# Patient Record
Sex: MALE | Race: WHITE | NOT HISPANIC OR LATINO | Employment: FULL TIME | URBAN - METROPOLITAN AREA
[De-identification: names, ages, dates, MRNs, and addresses within clinical notes are randomized per-mention and may not be internally consistent; named-entity substitution may affect disease eponyms.]

---

## 2019-07-03 ENCOUNTER — ANESTHESIA (EMERGENCY)
Dept: PERIOP | Facility: HOSPITAL | Age: 59
End: 2019-07-03
Payer: COMMERCIAL

## 2019-07-03 ENCOUNTER — HOSPITAL ENCOUNTER (EMERGENCY)
Facility: HOSPITAL | Age: 59
Discharge: HOME | End: 2019-07-03
Attending: EMERGENCY MEDICINE | Admitting: EMERGENCY MEDICINE
Payer: COMMERCIAL

## 2019-07-03 ENCOUNTER — APPOINTMENT (EMERGENCY)
Dept: RADIOLOGY | Facility: HOSPITAL | Age: 59
End: 2019-07-03
Payer: COMMERCIAL

## 2019-07-03 ENCOUNTER — ANESTHESIA EVENT (EMERGENCY)
Dept: PERIOP | Facility: HOSPITAL | Age: 59
End: 2019-07-03
Payer: COMMERCIAL

## 2019-07-03 ENCOUNTER — HOSPITAL ENCOUNTER (EMERGENCY)
Dept: PERIOP | Facility: HOSPITAL | Age: 59
Discharge: HOME | End: 2019-07-03
Attending: INTERNAL MEDICINE
Payer: COMMERCIAL

## 2019-07-03 VITALS
RESPIRATION RATE: 12 BRPM | SYSTOLIC BLOOD PRESSURE: 132 MMHG | HEART RATE: 95 BPM | TEMPERATURE: 97.5 F | OXYGEN SATURATION: 100 % | DIASTOLIC BLOOD PRESSURE: 89 MMHG

## 2019-07-03 VITALS
BODY MASS INDEX: 31.39 KG/M2 | DIASTOLIC BLOOD PRESSURE: 88 MMHG | HEART RATE: 88 BPM | SYSTOLIC BLOOD PRESSURE: 120 MMHG | WEIGHT: 200 LBS | RESPIRATION RATE: 20 BRPM | HEIGHT: 67 IN | OXYGEN SATURATION: 95 % | TEMPERATURE: 98 F

## 2019-07-03 DIAGNOSIS — K22.2 ESOPHAGEAL OBSTRUCTION DUE TO FOOD IMPACTION: Primary | ICD-10-CM

## 2019-07-03 DIAGNOSIS — T18.128A ESOPHAGEAL OBSTRUCTION DUE TO FOOD IMPACTION: Primary | ICD-10-CM

## 2019-07-03 DIAGNOSIS — T18.108A FOREIGN BODY IN ESOPHAGUS, INITIAL ENCOUNTER: ICD-10-CM

## 2019-07-03 DIAGNOSIS — K21.00 GASTROESOPHAGEAL REFLUX DISEASE WITH ESOPHAGITIS: Primary | ICD-10-CM

## 2019-07-03 LAB
ANION GAP SERPL CALCULATED.3IONS-SCNC: 11 MMOL/L (ref 4–13)
BASOPHILS # BLD AUTO: 0.04 THOUSANDS/ΜL (ref 0–0.1)
BASOPHILS NFR BLD AUTO: 1 % (ref 0–1)
BUN SERPL-MCNC: 20 MG/DL (ref 5–25)
CALCIUM SERPL-MCNC: 9 MG/DL (ref 8.3–10.1)
CHLORIDE SERPL-SCNC: 103 MMOL/L (ref 100–108)
CO2 SERPL-SCNC: 26 MMOL/L (ref 21–32)
CREAT SERPL-MCNC: 1.14 MG/DL (ref 0.6–1.3)
EOSINOPHIL # BLD AUTO: 0.26 THOUSAND/ΜL (ref 0–0.61)
EOSINOPHIL NFR BLD AUTO: 3 % (ref 0–6)
ERYTHROCYTE [DISTWIDTH] IN BLOOD BY AUTOMATED COUNT: 20.3 % (ref 11.6–15.1)
GFR SERPL CREATININE-BSD FRML MDRD: 70 ML/MIN/1.73SQ M
GLUCOSE SERPL-MCNC: 110 MG/DL (ref 65–140)
HCT VFR BLD AUTO: 38.7 % (ref 36.5–49.3)
HGB BLD-MCNC: 12 G/DL (ref 12–17)
IMM GRANULOCYTES # BLD AUTO: 0.05 THOUSAND/UL (ref 0–0.2)
IMM GRANULOCYTES NFR BLD AUTO: 1 % (ref 0–2)
INR PPP: 1.05 (ref 0.84–1.19)
LYMPHOCYTES # BLD AUTO: 1.74 THOUSANDS/ΜL (ref 0.6–4.47)
LYMPHOCYTES NFR BLD AUTO: 22 % (ref 14–44)
MCH RBC QN AUTO: 18.6 PG (ref 26.8–34.3)
MCHC RBC AUTO-ENTMCNC: 31 G/DL (ref 31.4–37.4)
MCV RBC AUTO: 60 FL (ref 82–98)
MONOCYTES # BLD AUTO: 0.62 THOUSAND/ΜL (ref 0.17–1.22)
MONOCYTES NFR BLD AUTO: 8 % (ref 4–12)
NEUTROPHILS # BLD AUTO: 5.21 THOUSANDS/ΜL (ref 1.85–7.62)
NEUTS SEG NFR BLD AUTO: 65 % (ref 43–75)
NRBC BLD AUTO-RTO: 1 /100 WBCS
PLATELET # BLD AUTO: 275 THOUSANDS/UL (ref 149–390)
PMV BLD AUTO: 9.3 FL (ref 8.9–12.7)
POTASSIUM SERPL-SCNC: 4.1 MMOL/L (ref 3.5–5.3)
PROTHROMBIN TIME: 13.7 SECONDS (ref 11.6–14.5)
RBC # BLD AUTO: 6.46 MILLION/UL (ref 3.88–5.62)
SODIUM SERPL-SCNC: 140 MMOL/L (ref 136–145)
TROPONIN I SERPL-MCNC: <0.02 NG/ML
WBC # BLD AUTO: 7.92 THOUSAND/UL (ref 4.31–10.16)

## 2019-07-03 PROCEDURE — 84484 ASSAY OF TROPONIN QUANT: CPT | Performed by: EMERGENCY MEDICINE

## 2019-07-03 PROCEDURE — 99285 EMERGENCY DEPT VISIT HI MDM: CPT

## 2019-07-03 PROCEDURE — 36415 COLL VENOUS BLD VENIPUNCTURE: CPT | Performed by: EMERGENCY MEDICINE

## 2019-07-03 PROCEDURE — 96374 THER/PROPH/DIAG INJ IV PUSH: CPT

## 2019-07-03 PROCEDURE — 85025 COMPLETE CBC W/AUTO DIFF WBC: CPT | Performed by: EMERGENCY MEDICINE

## 2019-07-03 PROCEDURE — 43235 EGD DIAGNOSTIC BRUSH WASH: CPT

## 2019-07-03 PROCEDURE — 93005 ELECTROCARDIOGRAM TRACING: CPT

## 2019-07-03 PROCEDURE — 85610 PROTHROMBIN TIME: CPT | Performed by: EMERGENCY MEDICINE

## 2019-07-03 PROCEDURE — 99283 EMERGENCY DEPT VISIT LOW MDM: CPT | Performed by: EMERGENCY MEDICINE

## 2019-07-03 PROCEDURE — 71045 X-RAY EXAM CHEST 1 VIEW: CPT

## 2019-07-03 PROCEDURE — 96375 TX/PRO/DX INJ NEW DRUG ADDON: CPT

## 2019-07-03 PROCEDURE — 80048 BASIC METABOLIC PNL TOTAL CA: CPT | Performed by: EMERGENCY MEDICINE

## 2019-07-03 RX ORDER — GLYCOPYRROLATE 0.2 MG/ML
INJECTION INTRAMUSCULAR; INTRAVENOUS AS NEEDED
Status: DISCONTINUED | OUTPATIENT
Start: 2019-07-03 | End: 2019-07-03 | Stop reason: SURG

## 2019-07-03 RX ORDER — NITROGLYCERIN 0.3 MG/1
0.3 TABLET SUBLINGUAL
COMMUNITY

## 2019-07-03 RX ORDER — IBUPROFEN 600 MG/1
TABLET ORAL EVERY 6 HOURS PRN
COMMUNITY
End: 2021-12-18

## 2019-07-03 RX ORDER — HYDROMORPHONE HCL/PF 1 MG/ML
0.4 SYRINGE (ML) INJECTION
Status: CANCELLED | OUTPATIENT
Start: 2019-07-03

## 2019-07-03 RX ORDER — OMEPRAZOLE 20 MG/1
20 CAPSULE, DELAYED RELEASE ORAL 2 TIMES DAILY
Qty: 60 CAPSULE | Refills: 1 | Status: SHIPPED | OUTPATIENT
Start: 2019-07-03 | End: 2019-07-15 | Stop reason: HOSPADM

## 2019-07-03 RX ORDER — FENTANYL CITRATE/PF 50 MCG/ML
25 SYRINGE (ML) INJECTION
Status: CANCELLED | OUTPATIENT
Start: 2019-07-03

## 2019-07-03 RX ORDER — PROPOFOL 10 MG/ML
INJECTION, EMULSION INTRAVENOUS AS NEEDED
Status: DISCONTINUED | OUTPATIENT
Start: 2019-07-03 | End: 2019-07-03 | Stop reason: SURG

## 2019-07-03 RX ORDER — ONDANSETRON 2 MG/ML
4 INJECTION INTRAMUSCULAR; INTRAVENOUS ONCE AS NEEDED
Status: CANCELLED | OUTPATIENT
Start: 2019-07-03

## 2019-07-03 RX ORDER — SIMVASTATIN 20 MG
20 TABLET ORAL
COMMUNITY
End: 2022-05-16

## 2019-07-03 RX ORDER — SUCCINYLCHOLINE/SOD CL,ISO/PF 100 MG/5ML
SYRINGE (ML) INTRAVENOUS AS NEEDED
Status: DISCONTINUED | OUTPATIENT
Start: 2019-07-03 | End: 2019-07-03 | Stop reason: SURG

## 2019-07-03 RX ORDER — ALBUTEROL SULFATE 90 UG/1
AEROSOL, METERED RESPIRATORY (INHALATION) AS NEEDED
Status: DISCONTINUED | OUTPATIENT
Start: 2019-07-03 | End: 2019-07-03 | Stop reason: SURG

## 2019-07-03 RX ORDER — METOCLOPRAMIDE HYDROCHLORIDE 5 MG/ML
10 INJECTION INTRAMUSCULAR; INTRAVENOUS ONCE AS NEEDED
Status: CANCELLED | OUTPATIENT
Start: 2019-07-03

## 2019-07-03 RX ORDER — SODIUM CHLORIDE, SODIUM LACTATE, POTASSIUM CHLORIDE, CALCIUM CHLORIDE 600; 310; 30; 20 MG/100ML; MG/100ML; MG/100ML; MG/100ML
50 INJECTION, SOLUTION INTRAVENOUS CONTINUOUS
Status: CANCELLED | OUTPATIENT
Start: 2019-07-03

## 2019-07-03 RX ORDER — SUCRALFATE 1 G/1
1 TABLET ORAL 4 TIMES DAILY
COMMUNITY
End: 2021-12-19 | Stop reason: HOSPADM

## 2019-07-03 RX ORDER — ALBUTEROL SULFATE 2.5 MG/3ML
2.5 SOLUTION RESPIRATORY (INHALATION) ONCE AS NEEDED
Status: CANCELLED | OUTPATIENT
Start: 2019-07-03

## 2019-07-03 RX ORDER — ONDANSETRON 2 MG/ML
4 INJECTION INTRAMUSCULAR; INTRAVENOUS ONCE
Status: COMPLETED | OUTPATIENT
Start: 2019-07-03 | End: 2019-07-03

## 2019-07-03 RX ORDER — SODIUM CHLORIDE, SODIUM LACTATE, POTASSIUM CHLORIDE, CALCIUM CHLORIDE 600; 310; 30; 20 MG/100ML; MG/100ML; MG/100ML; MG/100ML
INJECTION, SOLUTION INTRAVENOUS CONTINUOUS PRN
Status: DISCONTINUED | OUTPATIENT
Start: 2019-07-03 | End: 2019-07-03 | Stop reason: SURG

## 2019-07-03 RX ORDER — FENTANYL CITRATE 50 UG/ML
50 INJECTION, SOLUTION INTRAMUSCULAR; INTRAVENOUS ONCE
Status: COMPLETED | OUTPATIENT
Start: 2019-07-03 | End: 2019-07-03

## 2019-07-03 RX ORDER — MIDAZOLAM HYDROCHLORIDE 1 MG/ML
INJECTION INTRAMUSCULAR; INTRAVENOUS AS NEEDED
Status: DISCONTINUED | OUTPATIENT
Start: 2019-07-03 | End: 2019-07-03 | Stop reason: SURG

## 2019-07-03 RX ORDER — PROMETHAZINE HYDROCHLORIDE 25 MG/ML
12.5 INJECTION, SOLUTION INTRAMUSCULAR; INTRAVENOUS ONCE AS NEEDED
Status: CANCELLED | OUTPATIENT
Start: 2019-07-03

## 2019-07-03 RX ORDER — ONDANSETRON 2 MG/ML
INJECTION INTRAMUSCULAR; INTRAVENOUS AS NEEDED
Status: DISCONTINUED | OUTPATIENT
Start: 2019-07-03 | End: 2019-07-03 | Stop reason: SURG

## 2019-07-03 RX ORDER — ALBUTEROL SULFATE 2.5 MG/3ML
2.5 SOLUTION RESPIRATORY (INHALATION) EVERY 6 HOURS PRN
COMMUNITY

## 2019-07-03 RX ADMIN — MIDAZOLAM HYDROCHLORIDE 2 MG: 1 INJECTION, SOLUTION INTRAMUSCULAR; INTRAVENOUS at 20:48

## 2019-07-03 RX ADMIN — ONDANSETRON 4 MG: 2 INJECTION INTRAMUSCULAR; INTRAVENOUS at 20:53

## 2019-07-03 RX ADMIN — FENTANYL CITRATE 50 MCG: 50 INJECTION INTRAMUSCULAR; INTRAVENOUS at 19:46

## 2019-07-03 RX ADMIN — PROPOFOL 200 MG: 10 INJECTION, EMULSION INTRAVENOUS at 20:53

## 2019-07-03 RX ADMIN — LIDOCAINE HYDROCHLORIDE 100 MG: 20 INJECTION, SOLUTION INTRAVENOUS at 20:53

## 2019-07-03 RX ADMIN — ONDANSETRON 4 MG: 2 INJECTION INTRAMUSCULAR; INTRAVENOUS at 19:45

## 2019-07-03 RX ADMIN — GLYCOPYRROLATE 0.2 MG: 0.2 INJECTION, SOLUTION INTRAMUSCULAR; INTRAVENOUS at 20:48

## 2019-07-03 RX ADMIN — SODIUM CHLORIDE, SODIUM LACTATE, POTASSIUM CHLORIDE, AND CALCIUM CHLORIDE: .6; .31; .03; .02 INJECTION, SOLUTION INTRAVENOUS at 20:44

## 2019-07-03 RX ADMIN — Medication 160 MG: at 20:53

## 2019-07-03 RX ADMIN — ALBUTEROL SULFATE 3 PUFF: 90 AEROSOL, METERED RESPIRATORY (INHALATION) at 21:09

## 2019-07-03 NOTE — ED PROVIDER NOTES
History  Chief Complaint   Patient presents with    Foreign Body in Throat     pt reports to ed w c/o myrna swallowing  pt reports eating dinner and reports food stuck in throat  HPI patient is a 56-year-old male, reports eating dinner and a piece of pork chop got stuck in his esophagus  Patient reports a similar episode in the past   Patient reports he was able the clear that but required endoscopy with dilatation of his esophagus in the past   Patient reports history of cardiac disease in the past   He reports that this does not feel like his heart disease reports this feels like there is something stuck in his esophagus  Patient reports that he was eating a pork chop and then felt like it was stuck  Patient reports he cannot get any other fluids down  He reports he sips or drinks any small amounts of liquid and it just comes right back up  Past medical history of asthma, hyperlipidemia hypertension cardiac disease  Family history noncontributory  Social history, nonsmoker no history of drug abuse    Prior to Admission Medications   Prescriptions Last Dose Informant Patient Reported? Taking?    albuterol (2 5 mg/3 mL) 0 083 % nebulizer solution   Yes No   Sig: Take 2 5 mg by nebulization every 6 (six) hours as needed for wheezing or shortness of breath   ibuprofen (MOTRIN) 600 mg tablet   Yes No   Sig: Take by mouth every 6 (six) hours as needed for mild pain   metoprolol tartrate (LOPRESSOR) 25 mg tablet   Yes No   Sig: Take 25 mg by mouth every 12 (twelve) hours   nitroglycerin (NITROSTAT) 0 3 mg SL tablet   Yes No   Sig: Place 0 3 mg under the tongue every 5 (five) minutes as needed for chest pain   omeprazole (PriLOSEC) 20 mg delayed release capsule   No No   Sig: Take 1 capsule (20 mg total) by mouth 2 (two) times a day   raNITIdine HCl (ZANTAC 75 PO)   Yes No   Sig: Take by mouth   simvastatin (ZOCOR) 20 mg tablet   Yes No   Sig: Take 20 mg by mouth daily at bedtime   sucralfate (CARAFATE) 1 g tablet   Yes No   Sig: Take 1 g by mouth 4 (four) times a day      Facility-Administered Medications: None       Past Medical History:   Diagnosis Date    Asthma     [facility] )     liver    [facility] )     Hyperlipidemia     Hypertension        Past Surgical History:   Procedure Laterality Date    BACK SURGERY      CHOLECYSTECTOMY      CORONARY ANGIOPLASTY WITH STENT PLACEMENT      HERNIA REPAIR         History reviewed  No pertinent family history  I have reviewed and agree with the history as documented  Social History     Tobacco Use    Smoking status: Never Smoker    Smokeless tobacco: Never Used   Substance Use Topics    Alcohol use: Never     Frequency: Never    Drug use: Never        Review of Systems   Constitutional: Negative for diaphoresis, fatigue and fever  HENT: Positive for trouble swallowing  Negative for congestion, ear pain, nosebleeds and sore throat  Eyes: Negative for photophobia, pain, discharge and visual disturbance  Respiratory: Negative for cough, choking, chest tightness, shortness of breath and wheezing  Cardiovascular: Positive for chest pain  Negative for palpitations  Gastrointestinal: Negative for abdominal distention, abdominal pain, diarrhea and vomiting  Genitourinary: Negative for dysuria, flank pain and frequency  Musculoskeletal: Negative for back pain, gait problem and joint swelling  Skin: Negative for color change and rash  Neurological: Negative for dizziness, syncope and headaches  Psychiatric/Behavioral: Negative for behavioral problems and confusion  The patient is not nervous/anxious  All other systems reviewed and are negative  Physical Exam  Physical Exam   Constitutional: He is oriented to person, place, and time  He appears well-developed and well-nourished  HENT:   Head: Normocephalic     Right Ear: External ear normal    Left Ear: External ear normal    Nose: Nose normal    Mouth/Throat: Oropharynx is clear and moist    Eyes: Pupils are equal, round, and reactive to light  EOM and lids are normal    Neck: Normal range of motion  Neck supple  Cardiovascular: Normal rate, regular rhythm and normal heart sounds  Pulmonary/Chest: Effort normal and breath sounds normal  No respiratory distress  Abdominal: Soft  Bowel sounds are normal  There is no tenderness  Musculoskeletal: Normal range of motion  He exhibits no deformity  Neurological: He is alert and oriented to person, place, and time  Skin: Skin is warm and dry  Psychiatric: He has a normal mood and affect  Nursing note and vitals reviewed    Pulse oximetry 100% on room air adequate oxygenation there is no hypoxia    Vital Signs  ED Triage Vitals [07/03/19 1910]   Temperature Pulse Respirations Blood Pressure SpO2   98 °F (36 7 °C) 88 20 120/88 95 %      Temp Source Heart Rate Source Patient Position - Orthostatic VS BP Location FiO2 (%)   Oral Monitor Lying Right arm --      Pain Score       Worst Possible Pain           Vitals:    07/03/19 1910   BP: 120/88   Pulse: 88   Patient Position - Orthostatic VS: Lying         Visual Acuity      ED Medications  Medications   fentanyl citrate (PF) 100 MCG/2ML 50 mcg (50 mcg Intravenous Given 7/3/19 1946)   ondansetron (ZOFRAN) injection 4 mg (4 mg Intravenous Given 7/3/19 1945)       Diagnostic Studies  Results Reviewed     Procedure Component Value Units Date/Time    Basic metabolic panel [[id number]] Collected:  07/03/19 1928    Lab Status:  Final result Specimen:  Blood from Arm, Left Updated:  07/03/19 2008     Sodium 140 mmol/L      Potassium 4 1 mmol/L      Chloride 103 mmol/L      CO2 26 mmol/L      ANION GAP 11 mmol/L      BUN 20 mg/dL      Creatinine 1 14 mg/dL      Glucose 110 mg/dL      Calcium 9 0 mg/dL      eGFR 70 ml/min/1 73sq m     Narrative:       [place] guidelines for Chronic Kidney Disease (CKD):     Stage 1 with normal or high GFR (GFR > 90 mL/min/1 73 square meters)    Stage 2 Mild CKD (GFR = 60-89 mL/min/1 73 square meters)    Stage 3A Moderate CKD (GFR = 45-59 mL/min/1 73 square meters)    Stage 3B Moderate CKD (GFR = 30-44 mL/min/1 73 square meters)    Stage 4 Severe CKD (GFR = 15-29 mL/min/1 73 square meters)    Stage 5 End Stage CKD (GFR <15 mL/min/1 73 square meters)  Note: GFR calculation is accurate only with a steady state creatinine    Troponin I [[id number]]  (Normal) Collected:  07/03/19 1928    Lab Status:  Final result Specimen:  Blood from Arm, Left Updated:  07/03/19 2000     Troponin I <0 02 ng/mL     Protime-INR [[id number]]  (Normal) Collected:  07/03/19 1928    Lab Status:  Final result Specimen:  Blood from Arm, Left Updated:  07/03/19 1949     Protime 13 7 seconds      INR 1 05    CBC and differential [[id number]]  (Abnormal) Collected:  07/03/19 1928    Lab Status:  Final result Specimen:  Blood from Arm, Left Updated:  07/03/19 1936     WBC 7 92 Thousand/uL      RBC 6 46 Million/uL      Hemoglobin 12 0 g/dL      Hematocrit 38 7 %      MCV 60 fL      MCH 18 6 pg      MCHC 31 0 g/dL      RDW 20 3 %      MPV 9 3 fL      Platelets 904 Thousands/uL      nRBC 1 /100 WBCs      Neutrophils Relative 65 %      Immat GRANS % 1 %      Lymphocytes Relative 22 %      Monocytes Relative 8 %      Eosinophils Relative 3 %      Basophils Relative 1 %      Neutrophils Absolute 5 21 Thousands/µL      Immature Grans Absolute 0 05 Thousand/uL      Lymphocytes Absolute 1 74 Thousands/µL      Monocytes Absolute 0 62 Thousand/µL      Eosinophils Absolute 0 26 Thousand/µL      Basophils Absolute 0 04 Thousands/µL                  XR chest 1 view portable    (Results Pending)              Procedures  ECG 12 Lead Documentation Only  Date/Time: 7/3/2019 7:40 PM  Performed by: Fermin Mata MD  Authorized by: Fermin Mata MD     Indications / Diagnosis:  Esophageal foreign body, pork chop  ECG reviewed by me, the ED Provider: yes    Patient location:  ED  Previous ECG: Previous ECG:  Unavailable  Interpretation:     Interpretation: non-specific    Rate:     ECG rate:  90    ECG rate assessment: normal    Rhythm:     Rhythm: sinus rhythm    Ectopy:     Ectopy: none    QRS:     QRS axis:  Normal  ST segments:     ST segments:  Non-specific           ED Course          spoke with Dr Annika Lam, will do endoscopy     other diagnostic testing showed normal electrolytes  Cardiac troponin was negative no sign of cardiac ischemia, patient's white count normal 7 9 no sign of inflammation, hemoglobin normal at 12 no sign of anemia  MDM medical decision making 42-year-old male presents emergency department with a pork chop stuck in his esophagus  Patient is not able to drink even small amounts without bringing it back up  We tried a carbonated beverage if the patient was unable to tolerated  There was no relief of the patient's symptoms  I spoke with Gastroenterology patient will go to the OR for endoscopy  Disposition  Final diagnoses:   Esophageal obstruction due to food impaction     Time reflects when diagnosis was documented in both MDM as applicable and the Disposition within this note     Time User Action Codes Description Comment    7/3/2019  7:39 PM Adelina Martell [K22 2,  L08 [id number]] Esophageal obstruction due to food impaction     7/3/2019  8:15 PM Cecere, [address] [[id number]] Foreign body in esophagus, initial encounter       ED Disposition     ED Disposition Condition Date/Time Comment    Send to OR  Wed Jul 3, 2019  7:43 PM       Follow-up Information    None         Patient's Medications   Discharge Prescriptions    OMEPRAZOLE (PRILOSEC) 20 MG DELAYED RELEASE CAPSULE    Take 1 capsule (20 mg total) by mouth 2 (two) times a day       Start Date: 7/3/2019  End Date: --       Order Dose: 20 mg       Quantity: 60 capsule    Refills: 1     No discharge procedures on file      ED Provider  Electronically Signed by           Laura Granados MD  07/03/19 1710 [facility]

## 2019-07-04 NOTE — ANESTHESIA POSTPROCEDURE EVALUATION
Post-Op Assessment Note    CV Status:  Stable  Pain Score: 0    Pain management: adequate     Mental Status:  Sleepy   Hydration Status:  Stable   PONV Controlled:  None   Airway Patency:  Patent and adequate   Post Op Vitals Reviewed: Yes      Staff: CRNA           /81 (07/03/19 2115)    Temp 97 5 °F (36 4 °C) (07/03/19 2115)    Pulse 105 (07/03/19 2115)   Resp 12 (07/03/19 2115)    SpO2   98%

## 2019-07-04 NOTE — ANESTHESIA PREPROCEDURE EVALUATION
Review of Systems/Medical History  Patient summary reviewed  Chart reviewed  No history of anesthetic complications     Cardiovascular  Hyperlipidemia, Hypertension , Past MI , CAD , CAD status: 2VD, Cardiac stents     Pulmonary  Not a smoker , Asthma ,        GI/Hepatic    GI malignancy (liver cancer),   Comment:  Foreign body in esophagus, initial encounter           Endo/Other     GYN       Hematology   Musculoskeletal       Neurology   Psychology           Physical Exam    Airway    Mallampati score: II  TM Distance: >3 FB  Neck ROM: full     Dental   No notable dental hx     Cardiovascular  Cardiovascular exam normal    Pulmonary  Pulmonary exam normal Breath sounds clear to auscultation,     Other Findings        Anesthesia Plan  ASA Score- 3 Emergent    Anesthesia Type- general with ASA Monitors  Additional Monitors:   Airway Plan: ETT  Plan Factors- Patient instructed to abstain from smoking on day of procedure       Induction- intravenous  Postoperative Plan-   Planned trial extubation    Informed Consent- Anesthetic plan and risks discussed with patient  I personally reviewed this patient with the CRNA  Discussed and agreed on the Anesthesia Plan with the CRNA             Lab Results   Component Value Date    WBC 7 92 07/03/2019    HGB 12 0 07/03/2019    HCT 38 7 07/03/2019    MCV 60 (L) 07/03/2019     07/03/2019     Lab Results   Component Value Date    CALCIUM 9 0 07/03/2019    K 4 1 07/03/2019    CO2 26 07/03/2019     07/03/2019    BUN 20 07/03/2019    CREATININE 1 14 07/03/2019     Lab Results   Component Value Date    INR 1 05 07/03/2019    PROTIME 13 7 07/03/2019     No results found for: PTT  Type and Screen:        Discussed with pt the benefits/alternatives and risks or General Anesthesia including breathing tube remaining in place if not strong enough, PONV, damage to lips and teeth, sore throat, eye injury or blindness    I, Dr Mino Rust, the attending physician, have personally seen and evaluated the patient prior to anesthetic care  I have reviewed the pre-anesthetic record, and other medical records if appropriate to the anesthetic care  If a CRNA is involved in the case, I have reviewed the CRNA assessment, if present, and agree  The patient is in a suitable condition to proceed with my formulated anesthetic plan

## 2019-07-04 NOTE — H&P
History and Physical - [facility]  Yakov Lagunas 63 y o  male MRN: [id number]                  HPI: aYkov Lagunas is a 63y o  year old male who presents for emergency endoscopy  The patient went to a [employer] [facility] and ate a pork dinner today at 5:30 about 3 hours ago  Since that time he has felt intense substernal chest pressure and has been able to swallow  He spits out all of his secretions and liquids when he swallows it  He reports that this happened a year ago and he had a syncopal episode never able to get the food out and then the following day he had endoscopy with the dilation  He does have chronic gastroesophageal reflux disease with a hiatal hernia  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  He reports that his symptoms are partially controlled with ranitidine b i d  And Carafate q i d  He is not on a PPI for some unclear reason  He reports uncontrolled heartburn regurgitation at times  He reports solid food dysphagia at times  He denies liquid food dysphagia denies nausea vomiting melena hematochezia  He is not on any blood thinners  REVIEW OF SYSTEMS: Per the HPI, and otherwise unremarkable  Historical Information   Past Medical History:   Diagnosis Date    Asthma     [facility]     liver    [facility]     Hyperlipidemia     Hypertension      Past Surgical History:   Procedure Laterality Date    BACK SURGERY      CHOLECYSTECTOMY      HERNIA REPAIR       Social History   Social History     Substance and Sexual Activity   Alcohol Use Never    Frequency: Never     Social History     Substance and Sexual Activity   Drug Use Never     Social History     Tobacco Use   Smoking Status Never Smoker   Smokeless Tobacco Never Used     History reviewed  No pertinent family history      Meds/Allergies       (Not in a hospital admission)    Allergies   Allergen Reactions    Iodine Anaphylaxis    Penicillins Anaphylaxis       Objective     There were no vitals taken for this visit  /67   Pulse 86   Temp 98 6 °F (37 °C) (Temporal)   Resp 19   SpO2 95%         PHYSICAL EXAM    There were no vitals taken for this visit  Gen: NAD  CV: RRR  CHEST: Clear  ABD: soft, NT/ND  EXT: no edema      ASSESSMENT/PLAN:  This is a 63y o  year old male here for endoscopy with food impaction removal, and he is stable and optimized for his procedure

## 2019-07-05 LAB
ATRIAL RATE: 90 BPM
P AXIS: 54 DEGREES
PR INTERVAL: 170 MS
QRS AXIS: 71 DEGREES
QRSD INTERVAL: 92 MS
QT INTERVAL: 350 MS
QTC INTERVAL: 428 MS
T WAVE AXIS: 6 DEGREES
VENTRICULAR RATE: 90 BPM

## 2019-07-05 PROCEDURE — 93010 ELECTROCARDIOGRAM REPORT: CPT | Performed by: INTERNAL MEDICINE

## 2019-07-12 ENCOUNTER — TELEPHONE (OUTPATIENT)
Dept: GASTROENTEROLOGY | Facility: CLINIC | Age: 59
End: 2019-07-12

## 2019-07-15 ENCOUNTER — HOSPITAL ENCOUNTER (OUTPATIENT)
Dept: GASTROENTEROLOGY | Facility: HOSPITAL | Age: 59
Setting detail: OUTPATIENT SURGERY
Discharge: HOME | End: 2019-07-15
Attending: INTERNAL MEDICINE
Payer: COMMERCIAL

## 2019-07-15 ENCOUNTER — ANESTHESIA EVENT (OUTPATIENT)
Dept: GASTROENTEROLOGY | Facility: HOSPITAL | Age: 59
End: 2019-07-15

## 2019-07-15 ENCOUNTER — ANESTHESIA (OUTPATIENT)
Dept: GASTROENTEROLOGY | Facility: HOSPITAL | Age: 59
End: 2019-07-15

## 2019-07-15 VITALS
TEMPERATURE: 97.8 F | SYSTOLIC BLOOD PRESSURE: 128 MMHG | RESPIRATION RATE: 16 BRPM | BODY MASS INDEX: 32.48 KG/M2 | HEART RATE: 87 BPM | HEIGHT: 68 IN | DIASTOLIC BLOOD PRESSURE: 78 MMHG | OXYGEN SATURATION: 96 % | WEIGHT: 214.29 LBS

## 2019-07-15 DIAGNOSIS — R09.89 GLOBUS SENSATION: ICD-10-CM

## 2019-07-15 DIAGNOSIS — K21.9 GASTROESOPHAGEAL REFLUX DISEASE WITHOUT ESOPHAGITIS: Primary | ICD-10-CM

## 2019-07-15 PROCEDURE — 88305 TISSUE EXAM BY PATHOLOGIST: CPT | Performed by: PATHOLOGY

## 2019-07-15 PROCEDURE — 43239 EGD BIOPSY SINGLE/MULTIPLE: CPT | Performed by: INTERNAL MEDICINE

## 2019-07-15 RX ORDER — LIDOCAINE HYDROCHLORIDE 10 MG/ML
INJECTION, SOLUTION INFILTRATION; PERINEURAL AS NEEDED
Status: DISCONTINUED | OUTPATIENT
Start: 2019-07-15 | End: 2019-07-15 | Stop reason: SURG

## 2019-07-15 RX ORDER — PROPOFOL 10 MG/ML
INJECTION, EMULSION INTRAVENOUS AS NEEDED
Status: DISCONTINUED | OUTPATIENT
Start: 2019-07-15 | End: 2019-07-15 | Stop reason: SURG

## 2019-07-15 RX ORDER — OMEPRAZOLE 20 MG/1
20 CAPSULE, DELAYED RELEASE ORAL DAILY
Qty: 30 CAPSULE | Refills: 11 | Status: SHIPPED | OUTPATIENT
Start: 2019-07-15 | End: 2021-12-18

## 2019-07-15 RX ORDER — SODIUM CHLORIDE, SODIUM LACTATE, POTASSIUM CHLORIDE, CALCIUM CHLORIDE 600; 310; 30; 20 MG/100ML; MG/100ML; MG/100ML; MG/100ML
125 INJECTION, SOLUTION INTRAVENOUS CONTINUOUS
Status: DISCONTINUED | OUTPATIENT
Start: 2019-07-15 | End: 2019-07-19 | Stop reason: HOSPADM

## 2019-07-15 RX ADMIN — LIDOCAINE HYDROCHLORIDE 100 MG: 10 INJECTION, SOLUTION INFILTRATION; PERINEURAL at 13:51

## 2019-07-15 RX ADMIN — SODIUM CHLORIDE, SODIUM LACTATE, POTASSIUM CHLORIDE, AND CALCIUM CHLORIDE 125 ML/HR: .6; .31; .03; .02 INJECTION, SOLUTION INTRAVENOUS at 13:33

## 2019-07-15 RX ADMIN — PROPOFOL 100 MG: 10 INJECTION, EMULSION INTRAVENOUS at 13:51

## 2019-07-15 NOTE — DISCHARGE INSTRUCTIONS
Upper Endoscopy   WHAT YOU NEED TO KNOW:   An upper endoscopy is also called an upper gastrointestinal (GI) endoscopy, or an esophagogastroduodenoscopy (EGD)  You may feel bloated, gassy, or have some abdominal discomfort after your procedure  Your throat may be sore for 24 to 36 hours  You may burp or pass gas from air that is still inside your body  DISCHARGE INSTRUCTIONS:   Call 911 if:   · You have sudden chest pain or trouble breathing  Seek care immediately if:   · You feel dizzy or faint  · You have trouble swallowing  · You have severe throat pain  · Your bowel movements are very dark or black  · Your abdomen is hard and firm and you have severe pain  · You vomit blood  Contact your healthcare provider if:   · You feel full or bloated and cannot burp or pass gas  · You have not had a bowel movement for 3 days after your procedure  · You have neck pain  · You have a fever or chills  · You have nausea or are vomiting  · You have a rash or hives  · You have questions or concerns about your endoscopy  Relieve a sore throat:  Suck on throat lozenges or crushed ice  Gargle with a small amount of warm salt water  Mix 1 teaspoon of salt and 1 cup of warm water to make salt water  Relieve gas and discomfort from bloating:  Lie on your right side with a heating pad on your abdomen  Take short walks to help pass gas  Eat small meals until bloating is relieved  Rest after your procedure:  Do not drive or make important decisions until the day after your procedure  Return to your normal activity as directed  You can usually return to work the day after your procedure  Follow up with your healthcare provider as directed:  Write down your questions so you remember to ask them during your visits  © 2017 [employer]  Information is for End User's use only and may not be sold, redistributed or otherwise used for commercial purposes   All illustrations and images included in [facility] are the copyrighted property of [employer]  or [employer]  The above information is an  only  It is not intended as medical advice for individual conditions or treatments  Talk to your doctor, nurse or pharmacist before following any medical regimen to see if it is safe and effective for you

## 2019-07-15 NOTE — ANESTHESIA PREPROCEDURE EVALUATION
Review of Systems/Medical History  Patient summary reviewed  Chart reviewed  No history of anesthetic complications     Cardiovascular  EKG reviewed, Exercise tolerance (METS): >4,  Hyperlipidemia, Hypertension controlled, Past MI > 6 months, CAD , History of percutaneous transluminal coronary angioplasty, No angina ,    Pulmonary  Asthma , PRN med  controlled ,        GI/Hepatic            Endo/Other     GYN       Hematology   Musculoskeletal       Neurology   Psychology           Physical Exam    Airway    Mallampati score: III  TM Distance: >3 FB  Neck ROM: full     Dental   No notable dental hx     Cardiovascular      Pulmonary      Other Findings        Anesthesia Plan  ASA Score- 3     Anesthesia Type- IV sedation with anesthesia with ASA Monitors  Additional Monitors:   Airway Plan:         Plan Factors-    Induction- intravenous  Postoperative Plan-     Informed Consent- Anesthetic plan and risks discussed with patient  I personally reviewed this patient with the CRNA  Discussed and agreed on the Anesthesia Plan with the CRNA  Randa Barbosa

## 2019-07-15 NOTE — ANESTHESIA POSTPROCEDURE EVALUATION
Post-Op Assessment Note    CV Status:  Stable    Pain management: adequate     Mental Status:  Sleepy   Hydration Status:  Euvolemic   PONV Controlled:  Controlled   Airway Patency:  Patent   Post Op Vitals Reviewed: Yes      Staff: CRNA           /61 (07/15/19 1402)    Temp 97 8 °F (36 6 °C) (07/15/19 1402)    Pulse 87 (07/15/19 1402)   Resp 18 (07/15/19 1402)    SpO2 95 % (07/15/19 1402)

## 2019-07-15 NOTE — H&P
History and Physical -  [facility]  Gaby Alegria 63 y o  male MRN: [id number]                  HPI: Gaby Alegria is a 63y o  year old male who presents for an EGD after a food impaction a few weeks ago      REVIEW OF SYSTEMS: Per the HPI, and otherwise unremarkable  Historical Information   Past Medical History:   Diagnosis Date    Asthma     [facility] )     liver    [facility] ([facility] )     Hyperlipidemia     Hypertension      Past Surgical History:   Procedure Laterality Date    BACK SURGERY      CHOLECYSTECTOMY      CORONARY ANGIOPLASTY WITH STENT PLACEMENT      HERNIA REPAIR       Social History   Social History     Substance and Sexual Activity   Alcohol Use Never    Frequency: Never     Social History     Substance and Sexual Activity   Drug Use Never     Social History     Tobacco Use   Smoking Status Never Smoker   Smokeless Tobacco Never Used     No family history on file  Meds/Allergies       (Not in a hospital admission)    Allergies   Allergen Reactions    Iodine Anaphylaxis    Penicillins Anaphylaxis       Objective     There were no vitals taken for this visit  PHYSICAL EXAM    There were no vitals taken for this visit  Gen: NAD  CV: RRR  CHEST: Clear  ABD: soft, NT/ND  EXT: no edema      ASSESSMENT/PLAN:  This is a 63y o  year old male here for endoscopy, and he is stable and optimized for his procedure

## 2019-07-18 ENCOUNTER — TELEPHONE (OUTPATIENT)
Dept: GASTROENTEROLOGY | Facility: CLINIC | Age: 59
End: 2019-07-18

## 2019-07-18 NOTE — TELEPHONE ENCOUNTER
----- Message from Cornelio Medina MD sent at 7/18/2019  9:29 AM EDT -----  pls tell him path was normal

## 2019-10-10 ENCOUNTER — HOSPITAL ENCOUNTER (EMERGENCY)
Facility: HOSPITAL | Age: 59
Discharge: HOME | End: 2019-10-11
Attending: EMERGENCY MEDICINE | Admitting: EMERGENCY MEDICINE
Payer: COMMERCIAL

## 2019-10-10 VITALS
RESPIRATION RATE: 18 BRPM | BODY MASS INDEX: 32.58 KG/M2 | DIASTOLIC BLOOD PRESSURE: 69 MMHG | SYSTOLIC BLOOD PRESSURE: 123 MMHG | OXYGEN SATURATION: 94 % | WEIGHT: 207.6 LBS | TEMPERATURE: 97.9 F | HEART RATE: 92 BPM | HEIGHT: 67 IN

## 2019-10-10 DIAGNOSIS — T63.441A ALLERGIC REACTION TO BEE STING: Primary | ICD-10-CM

## 2019-10-10 DIAGNOSIS — T78.2XXA ANAPHYLAXIS, INITIAL ENCOUNTER: ICD-10-CM

## 2019-10-10 PROCEDURE — 96372 THER/PROPH/DIAG INJ SC/IM: CPT

## 2019-10-10 PROCEDURE — 96375 TX/PRO/DX INJ NEW DRUG ADDON: CPT

## 2019-10-10 PROCEDURE — 99283 EMERGENCY DEPT VISIT LOW MDM: CPT

## 2019-10-10 PROCEDURE — 96374 THER/PROPH/DIAG INJ IV PUSH: CPT

## 2019-10-10 PROCEDURE — 99284 EMERGENCY DEPT VISIT MOD MDM: CPT | Performed by: PHYSICIAN ASSISTANT

## 2019-10-10 PROCEDURE — 93005 ELECTROCARDIOGRAM TRACING: CPT

## 2019-10-10 RX ORDER — EPINEPHRINE 1 MG/ML
0.3 INJECTION, SOLUTION, CONCENTRATE INTRAVENOUS ONCE
Status: COMPLETED | OUTPATIENT
Start: 2019-10-10 | End: 2019-10-10

## 2019-10-10 RX ORDER — DIPHENHYDRAMINE HYDROCHLORIDE 50 MG/ML
25 INJECTION INTRAMUSCULAR; INTRAVENOUS ONCE
Status: COMPLETED | OUTPATIENT
Start: 2019-10-10 | End: 2019-10-10

## 2019-10-10 RX ORDER — PREDNISONE 20 MG/1
60 TABLET ORAL ONCE
Status: COMPLETED | OUTPATIENT
Start: 2019-10-10 | End: 2019-10-10

## 2019-10-10 RX ADMIN — PREDNISONE 60 MG: 20 TABLET ORAL at 23:16

## 2019-10-10 RX ADMIN — DIPHENHYDRAMINE HYDROCHLORIDE 25 MG: 50 INJECTION, SOLUTION INTRAMUSCULAR; INTRAVENOUS at 23:19

## 2019-10-10 RX ADMIN — FAMOTIDINE 20 MG: 10 INJECTION, SOLUTION INTRAVENOUS at 23:19

## 2019-10-10 RX ADMIN — EPINEPHRINE 0.3 MG: 1 INJECTION, SOLUTION, CONCENTRATE INTRAVENOUS at 23:19

## 2019-10-11 LAB
ATRIAL RATE: 86 BPM
P AXIS: 56 DEGREES
PR INTERVAL: 172 MS
QRS AXIS: 62 DEGREES
QRSD INTERVAL: 94 MS
QT INTERVAL: 368 MS
QTC INTERVAL: 440 MS
T WAVE AXIS: 22 DEGREES
VENTRICULAR RATE: 86 BPM

## 2019-10-11 PROCEDURE — 93010 ELECTROCARDIOGRAM REPORT: CPT | Performed by: INTERNAL MEDICINE

## 2019-10-11 RX ORDER — PREDNISONE 50 MG/1
50 TABLET ORAL DAILY
Qty: 4 TABLET | Refills: 0 | Status: SHIPPED | OUTPATIENT
Start: 2019-10-11 | End: 2021-12-19 | Stop reason: HOSPADM

## 2019-10-11 RX ORDER — EPINEPHRINE 0.3 MG/.3ML
0.3 INJECTION SUBCUTANEOUS ONCE
Qty: 0.6 ML | Refills: 0 | Status: SHIPPED | OUTPATIENT
Start: 2019-10-11 | End: 2019-10-11 | Stop reason: SDUPTHER

## 2019-10-11 RX ORDER — EPINEPHRINE 0.3 MG/.3ML
0.3 INJECTION SUBCUTANEOUS ONCE
Qty: 0.6 ML | Refills: 0 | Status: SHIPPED | OUTPATIENT
Start: 2019-10-11 | End: 2021-12-18

## 2019-10-11 NOTE — ED PROVIDER NOTES
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, hypertension, asthma, and CAD who presents with complaint of multiple bee stings this morning while up on a scaffold at work  Patient states that he was stung in the right leg, right arm, and back of the head  He immediately experienced pain and heat at the sting sites, as well as wheezing, shortness of breath, and tongue swelling which continued to worsen throughout the day  Patient states he went to his local pharmacy and received Benadryl from the pharmacist; patient took 50mg, which did not alleviate symptoms  Patient states that he fell asleep at home shortly after taking the medication  He admits to allergic reactions to bee stings in the past, for which he received treatment in the ER but was never hospitalized  Patient states that he has not been prescribed an EpiPen  He admits to pain, burning, and pruritus currently at sting sites, as well as swollen tongue, SOB, dizziness, and disorientation  He denies nasal congestion, chest tightness, hives, syncope, confusion  Prior to Admission Medications   Prescriptions Last Dose Informant Patient Reported? Taking?    albuterol (2 5 mg/3 mL) 0 083 % nebulizer solution   Yes No   Sig: Take 2 5 mg by nebulization every 6 (six) hours as needed for wheezing or shortness of breath   ibuprofen (MOTRIN) 600 mg tablet   Yes No   Sig: Take by mouth every 6 (six) hours as needed for mild pain   metoprolol tartrate (LOPRESSOR) 25 mg tablet   Yes No   Sig: Take 25 mg by mouth every 12 (twelve) hours   nitroglycerin (NITROSTAT) 0 3 mg SL tablet   Yes No   Sig: Place 0 3 mg under the tongue every 5 (five) minutes as needed for chest pain   omeprazole (PriLOSEC) 20 mg delayed release capsule   No No   Sig: Take 1 capsule (20 mg total) by mouth daily   raNITIdine HCl (ZANTAC 75 PO)   Yes No   Sig: Take by mouth   simvastatin (ZOCOR) 20 mg tablet   Yes No   Sig: Take 20 mg by mouth daily at bedtime   sucralfate (CARAFATE) 1 g tablet   Yes No   Sig: Take 1 g by mouth 4 (four) times a day      Facility-Administered Medications: None       Past Medical History:   Diagnosis Date    Ang[place] [address] )     Asthma     Cancer ([place] [address] )     liver    Colon polyp     Coronary artery disease     Heart attack ([place] [address] )     Hyperlipidemia     Hypertension     Liver disease     Shortness of breath        Past Surgical History:   Procedure Laterality Date    BACK SURGERY      CHOLECYSTECTOMY      COLONOSCOPY      CORONARY ANGIOPLASTY WITH STENT PLACEMENT      HERNIA REPAIR      KNEE ARTHROSCOPY      TONSILLECTOMY         History reviewed  No pertinent family history  I have reviewed and agree with the history as documented  Social History     Tobacco Use    Smoking status: Never Smoker    Smokeless tobacco: Never Used   Substance Use Topics    Alcohol use: Never     Frequency: Never    Drug use: Never        Review of Systems   Constitutional: Positive for fatigue  Negative for chills, diaphoresis and fever  HENT: Positive for trouble swallowing  Negative for congestion, drooling, facial swelling, sore throat and voice change  Respiratory: Positive for shortness of breath and wheezing  Negative for cough, choking, chest tightness and stridor  Cardiovascular: Negative for chest pain, palpitations and leg swelling  Gastrointestinal: Negative for abdominal pain, constipation, diarrhea, nausea and vomiting  Musculoskeletal: Negative for arthralgias, gait problem, joint swelling and myalgias  Skin: Negative for color change, pallor, rash and wound  Allergic/Immunologic: Positive for environmental allergies     Neurological: Positive for dizziness, weakness, light-headedness and numbness  Negative for tremors, syncope, speech difficulty and headaches  Numbness felt in lips   All other systems reviewed and are negative  Physical Exam  Physical Exam   Constitutional: He is oriented to person, place, and time  He appears well-developed and well-nourished  No distress  HENT:   Head: Normocephalic and atraumatic  Mouth/Throat: Oropharynx is clear and moist  No oropharyngeal exudate  No TTP on exam   Eyes: Pupils are equal, round, and reactive to light  Conjunctivae and EOM are normal    Neck: Normal range of motion  Neck supple  Cardiovascular: Regular rhythm, S1 normal, S2 normal, normal heart sounds and intact distal pulses  Frequent extrasystoles are present  Bradycardia present  No murmur heard  Multiple PVCs auscultated and observed on cardiac monitor while in room examining patient    Pulmonary/Chest: Effort normal and breath sounds normal  No accessory muscle usage or stridor  No tachypnea and no bradypnea  No respiratory distress  He has no decreased breath sounds  He has no wheezes  Abdominal: Soft  Bowel sounds are normal  He exhibits no distension  There is no tenderness  Musculoskeletal: He exhibits edema  He exhibits no tenderness  Right knee: He exhibits no swelling and no erythema  No tenderness found  Arms:       Legs:  TTP on exam, no discrete bite sites or surrounding erythema and edema    Neurological: He is alert and oriented to person, place, and time  Skin: Skin is warm, dry and intact  Capillary refill takes less than 2 seconds  No rash noted  He is not diaphoretic  No erythema  No pallor  No sting sites or swelling visualized on leg, arm, and back of head where patient is reporting pain and burning sensation  Vitals reviewed        Vital Signs  ED Triage Vitals   Temperature Pulse Respirations Blood Pressure SpO2   10/10/19 2233 10/10/19 2235 10/10/19 2235 10/10/19 2235 10/10/19 2235   97 9 °F (36 6 °C) (!) 50 18 139/63 92 %      Temp Source Heart Rate Source Patient Position - Orthostatic VS BP Location FiO2 (%)   10/10/19 2233 10/10/19 2351 -- 10/10/19 2351 --   Oral Monitor  Right arm       Pain Score       --                  Vitals:    10/10/19 2235 10/10/19 2351   BP: 139/63 123/69   Pulse: (!) 50 92         Visual Acuity      ED Medications  Medications   diphenhydrAMINE (BENADRYL) injection 25 mg (25 mg Intravenous Given 10/10/19 2319)   EPINEPHrine PF (ADRENALIN) 1 mg/mL injection 0 3 mg (0 3 mg Intramuscular Given 10/10/19 2319)   famotidine (PEPCID) injection 20 mg (20 mg Intravenous Given 10/10/19 2319)   predniSONE tablet 60 mg (60 mg Oral Given 10/10/19 2316)       Diagnostic Studies  Results Reviewed     None                 No orders to display              Procedures  ECG 12 Lead Documentation Only  Date/Time: 10/10/2019 11:23 PM  Performed by: Nata Hernandez PA-C  Authorized by: Nata Hernandez PA-C     Indications / Diagnosis:  Anaphylaxis  ECG reviewed by me, the ED Provider: yes    Patient location:  Bedside  Previous ECG:     Previous ECG:  Compared to current    Comparison ECG info:  Sinus rhythm with frequent PVCs changed to sinus rhythm with frequent PVC's in pattern of bigeminy    Similarity:  Changes noted    Comparison to cardiac monitor: No    Interpretation:     Interpretation: normal    Rate:     ECG rate:  86    ECG rate assessment: normal    Rhythm:     Rhythm: sinus rhythm    Ectopy:     Ectopy: bigeminy and PVCs      PVCs:  Frequent  QRS:     QRS axis:  Normal  Conduction:     Conduction: normal    ST segments:     ST segments:  Normal  T waves:     T waves: normal             ED Course                               MDM  Number of Diagnoses or Management Options  Allergic reaction to bee sting:   Anaphylaxis, initial encounter:   Diagnosis management comments: 64 y/o male with history of anaphylaxis following bee sting who presents with pain, swelling, burning, and pruritus at bite sites, shortness of breath, swollen tongue, and dizziness/lightheadedness following multiple bee stings this morning while at work  Patient took 50mg of Benadryl and fell asleep but awoke to worsening symptoms  He does not have an EpiPen at home  On exam, patient's voice is horse, he admits to SOB and mouth swelling  Multiple PVC's observed on monitor while in room examining patient, will obtain EKG  Will administer anaphylaxis cocktail including epinephrine, prednisone, famotidine, and diphenhydramine  Will place patient on nasal canula  Will obtain continuous cardiac monitoring and pulse ox  Amount and/or Complexity of Data Reviewed  Tests in the medicine section of CPT®: ordered and reviewed  Discussion of test results with the performing providers: yes  Review and summarize past medical records: yes  Discuss the patient with other providers: yes  Independent visualization of images, tracings, or specimens: yes    Risk of Complications, Morbidity, and/or Mortality  General comments: EKG showed normal sinus rhythm with multiple PVC's in bigeminy, compared to previous EKG with NSR and occasional PVC's  Patient hemodynamically stable and oxygenating well  I examined the patient after medication administration  Patient reported decreased work of breathing, SOB, feeling of swollen tongue and mouth, and dizziness  Vital signs stable, HR elevated in 90s  Epi administered at 11:30, will keep patient in ED for observation for total of at least two hours  I counseled the patient on reasons to return to the emergency department, which included worsening of pain, swelling, redness, tongue/mouth swelling, shortness of breath, dizziness/lightheadedness and/or development of cough, wheezing, fever, chills, nausea, vomiting, syncope  I answered any and all questions the patient had regarding emergency department course of evaluation and treatment   The patient verbalized understanding of and agreement with plan  I signed the patient out to Dr Tomas Salinas  Patient Progress  Patient progress: stable      Disposition  Final diagnoses: Allergic reaction to bee sting   Anaphylaxis, initial encounter     Time reflects when diagnosis was documented in both MDM as applicable and the Disposition within this note     Time User Action Codes Description Comment    10/11/2019 12:16 AM Zi Hall Add [X42 760O] Allergic reaction to bee sting     10/11/2019 12:16 AM Ankit Vila  2XXA] Anaphylaxis, initial encounter       ED Disposition     ED Disposition Condition Date/Time Comment    Discharge Stable Fri Oct 11, 2019 99:61 AM Adela Zacarias discharge to home/self care  Follow-up Information     Follow up With Specialties Details Why Contact Info Additional [address] [facility] Emergency Department Emergency Medicine Go to  If symptoms worsen [address] [facility] [address] [phone] [place] ED, [address] [place], [place], [address],  Family Medicine Schedule an appointment as soon as possible for a visit in 3 days For further evaluation [address] [place] [address]  [phone]             Patient's Medications   Discharge Prescriptions    EPINEPHRINE (EPIPEN) 0 3 MG/0 3 ML SOAJ    Inject 0 3 mL (0 3 mg total) into a muscle once for 1 dose       Start Date: 10/11/2019End Date: 10/11/2019       Order Dose: 0 3 mg       Quantity: 0 6 mL    Refills: 0     No discharge procedures on file      ED Provider  Electronically Signed by           Bonita Daly PA-C  10/11/19 0028

## 2020-01-01 ENCOUNTER — APPOINTMENT (EMERGENCY)
Dept: CT IMAGING | Facility: HOSPITAL | Age: 60
End: 2020-01-01
Payer: COMMERCIAL

## 2020-01-01 ENCOUNTER — HOSPITAL ENCOUNTER (EMERGENCY)
Facility: HOSPITAL | Age: 60
Discharge: HOME | End: 2020-01-01
Attending: EMERGENCY MEDICINE | Admitting: EMERGENCY MEDICINE
Payer: COMMERCIAL

## 2020-01-01 VITALS
SYSTOLIC BLOOD PRESSURE: 108 MMHG | DIASTOLIC BLOOD PRESSURE: 69 MMHG | TEMPERATURE: 97.7 F | BODY MASS INDEX: 32.08 KG/M2 | HEART RATE: 72 BPM | RESPIRATION RATE: 18 BRPM | WEIGHT: 204.4 LBS | OXYGEN SATURATION: 96 % | HEIGHT: 67 IN

## 2020-01-01 DIAGNOSIS — K57.92 DIVERTICULITIS: Primary | ICD-10-CM

## 2020-01-01 LAB
ALBUMIN SERPL BCP-MCNC: 3.9 G/DL (ref 3.5–5)
ALP SERPL-CCNC: 56 U/L (ref 46–116)
ALT SERPL W P-5'-P-CCNC: 19 U/L (ref 12–78)
ANION GAP SERPL CALCULATED.3IONS-SCNC: 5 MMOL/L (ref 4–13)
AST SERPL W P-5'-P-CCNC: 16 U/L (ref 5–45)
ATRIAL RATE: 75 BPM
BASOPHILS # BLD AUTO: 0.06 THOUSANDS/ΜL (ref 0–0.1)
BASOPHILS NFR BLD AUTO: 1 % (ref 0–1)
BILIRUB SERPL-MCNC: 1.5 MG/DL (ref 0.2–1)
BILIRUB UR QL STRIP: NEGATIVE
BUN SERPL-MCNC: 12 MG/DL (ref 5–25)
CALCIUM SERPL-MCNC: 8.9 MG/DL (ref 8.3–10.1)
CHLORIDE SERPL-SCNC: 102 MMOL/L (ref 100–108)
CLARITY UR: CLEAR
CO2 SERPL-SCNC: 30 MMOL/L (ref 21–32)
COLOR UR: YELLOW
CREAT SERPL-MCNC: 1.01 MG/DL (ref 0.6–1.3)
EOSINOPHIL # BLD AUTO: 0.2 THOUSAND/ΜL (ref 0–0.61)
EOSINOPHIL NFR BLD AUTO: 2 % (ref 0–6)
ERYTHROCYTE [DISTWIDTH] IN BLOOD BY AUTOMATED COUNT: 19.2 % (ref 11.6–15.1)
GFR SERPL CREATININE-BSD FRML MDRD: 82 ML/MIN/1.73SQ M
GLUCOSE SERPL-MCNC: 102 MG/DL (ref 65–140)
GLUCOSE UR STRIP-MCNC: NEGATIVE MG/DL
HCT VFR BLD AUTO: 41.9 % (ref 36.5–49.3)
HGB BLD-MCNC: 12.8 G/DL (ref 12–17)
HGB UR QL STRIP.AUTO: NEGATIVE
IMM GRANULOCYTES # BLD AUTO: 0.07 THOUSAND/UL (ref 0–0.2)
IMM GRANULOCYTES NFR BLD AUTO: 1 % (ref 0–2)
KETONES UR STRIP-MCNC: NEGATIVE MG/DL
LEUKOCYTE ESTERASE UR QL STRIP: NEGATIVE
LIPASE SERPL-CCNC: 41 U/L (ref 73–393)
LYMPHOCYTES # BLD AUTO: 1.92 THOUSANDS/ΜL (ref 0.6–4.47)
LYMPHOCYTES NFR BLD AUTO: 17 % (ref 14–44)
MCH RBC QN AUTO: 18.7 PG (ref 26.8–34.3)
MCHC RBC AUTO-ENTMCNC: 30.5 G/DL (ref 31.4–37.4)
MCV RBC AUTO: 61 FL (ref 82–98)
MONOCYTES # BLD AUTO: 0.63 THOUSAND/ΜL (ref 0.17–1.22)
MONOCYTES NFR BLD AUTO: 6 % (ref 4–12)
NEUTROPHILS # BLD AUTO: 8.55 THOUSANDS/ΜL (ref 1.85–7.62)
NEUTS SEG NFR BLD AUTO: 73 % (ref 43–75)
NITRITE UR QL STRIP: NEGATIVE
NRBC BLD AUTO-RTO: 0 /100 WBCS
P AXIS: 59 DEGREES
PH UR STRIP.AUTO: 6.5 [PH]
PLATELET # BLD AUTO: 310 THOUSANDS/UL (ref 149–390)
PMV BLD AUTO: 9.8 FL (ref 8.9–12.7)
POTASSIUM SERPL-SCNC: 4.4 MMOL/L (ref 3.5–5.3)
PR INTERVAL: 176 MS
PROT SERPL-MCNC: 7.3 G/DL (ref 6.4–8.2)
PROT UR STRIP-MCNC: NEGATIVE MG/DL
QRS AXIS: 56 DEGREES
QRSD INTERVAL: 92 MS
QT INTERVAL: 412 MS
QTC INTERVAL: 460 MS
RBC # BLD AUTO: 6.83 MILLION/UL (ref 3.88–5.62)
SODIUM SERPL-SCNC: 137 MMOL/L (ref 136–145)
SP GR UR STRIP.AUTO: 1.02 (ref 1–1.03)
T WAVE AXIS: 59 DEGREES
UROBILINOGEN UR QL STRIP.AUTO: 0.2 E.U./DL
VENTRICULAR RATE: 75 BPM
WBC # BLD AUTO: 11.43 THOUSAND/UL (ref 4.31–10.16)

## 2020-01-01 PROCEDURE — 74176 CT ABD & PELVIS W/O CONTRAST: CPT

## 2020-01-01 PROCEDURE — 83690 ASSAY OF LIPASE: CPT | Performed by: EMERGENCY MEDICINE

## 2020-01-01 PROCEDURE — 36415 COLL VENOUS BLD VENIPUNCTURE: CPT | Performed by: EMERGENCY MEDICINE

## 2020-01-01 PROCEDURE — 80053 COMPREHEN METABOLIC PANEL: CPT | Performed by: EMERGENCY MEDICINE

## 2020-01-01 PROCEDURE — 99284 EMERGENCY DEPT VISIT MOD MDM: CPT

## 2020-01-01 PROCEDURE — 96361 HYDRATE IV INFUSION ADD-ON: CPT

## 2020-01-01 PROCEDURE — 96374 THER/PROPH/DIAG INJ IV PUSH: CPT

## 2020-01-01 PROCEDURE — 85025 COMPLETE CBC W/AUTO DIFF WBC: CPT | Performed by: EMERGENCY MEDICINE

## 2020-01-01 PROCEDURE — 93010 ELECTROCARDIOGRAM REPORT: CPT | Performed by: INTERNAL MEDICINE

## 2020-01-01 PROCEDURE — 99284 EMERGENCY DEPT VISIT MOD MDM: CPT | Performed by: EMERGENCY MEDICINE

## 2020-01-01 PROCEDURE — 93005 ELECTROCARDIOGRAM TRACING: CPT

## 2020-01-01 PROCEDURE — 81003 URINALYSIS AUTO W/O SCOPE: CPT | Performed by: EMERGENCY MEDICINE

## 2020-01-01 RX ORDER — AMOXICILLIN AND CLAVULANATE POTASSIUM 875; 125 MG/1; MG/1
1 TABLET, FILM COATED ORAL ONCE
Status: COMPLETED | OUTPATIENT
Start: 2020-01-01 | End: 2020-01-01

## 2020-01-01 RX ORDER — OXYCODONE HYDROCHLORIDE AND ACETAMINOPHEN 5; 325 MG/1; MG/1
1 TABLET ORAL ONCE
Status: COMPLETED | OUTPATIENT
Start: 2020-01-01 | End: 2020-01-01

## 2020-01-01 RX ORDER — AMOXICILLIN AND CLAVULANATE POTASSIUM 875; 125 MG/1; MG/1
1 TABLET, FILM COATED ORAL EVERY 12 HOURS
Qty: 20 TABLET | Refills: 0 | Status: SHIPPED | OUTPATIENT
Start: 2020-01-01 | End: 2020-01-11

## 2020-01-01 RX ORDER — MORPHINE SULFATE 4 MG/ML
4 INJECTION, SOLUTION INTRAMUSCULAR; INTRAVENOUS ONCE
Status: COMPLETED | OUTPATIENT
Start: 2020-01-01 | End: 2020-01-01

## 2020-01-01 RX ORDER — OXYCODONE HYDROCHLORIDE AND ACETAMINOPHEN 5; 325 MG/1; MG/1
1 TABLET ORAL EVERY 4 HOURS PRN
Qty: 12 TABLET | Refills: 0 | Status: SHIPPED | OUTPATIENT
Start: 2020-01-01 | End: 2020-01-11

## 2020-01-01 RX ADMIN — SODIUM CHLORIDE 1000 ML: 0.9 INJECTION, SOLUTION INTRAVENOUS at 14:04

## 2020-01-01 RX ADMIN — OXYCODONE HYDROCHLORIDE AND ACETAMINOPHEN 1 TABLET: 5; 325 TABLET ORAL at 16:16

## 2020-01-01 RX ADMIN — AMOXICILLIN AND CLAVULANATE POTASSIUM 1 TABLET: 875; 125 TABLET, FILM COATED ORAL at 16:16

## 2020-01-01 RX ADMIN — MORPHINE SULFATE 4 MG: 4 INJECTION INTRAVENOUS at 14:05

## 2020-01-01 NOTE — ED PROVIDER NOTES
History  Chief Complaint   Patient presents with    Abdominal Pain     pt presents with c/o LLQ abd pain since last night, radiating into groin  Denies other symptoms  History provided by:  Patient  Abdominal Pain   Pain location:  Suprapubic and LLQ  Pain quality: bloating and fullness    Pain radiates to:  Does not radiate  Pain severity:  Moderate  Onset quality:  Gradual  Duration:  2 days  Timing:  Constant  Progression:  Worsening  Chronicity:  New  Context: previous surgery (lori)    Context: not medication withdrawal, not sick contacts and not suspicious food intake    Relieved by:  Nothing  Worsened by:  Nothing  Ineffective treatments:  None tried  Associated symptoms: anorexia, diarrhea (some loose stools) and fatigue    Associated symptoms: no belching, no chest pain, no fever, no hematuria, no melena, no nausea, no shortness of breath, no sore throat and no vomiting    Risk factors: no alcohol abuse        Prior to Admission Medications   Prescriptions Last Dose Informant Patient Reported? Taking?    EPINEPHrine (EPIPEN) 0 3 mg/0 3 mL SOAJ   No No   Sig: Inject 0 3 mL (0 3 mg total) into a muscle once for 1 dose   albuterol (2 5 mg/3 mL) 0 083 % nebulizer solution   Yes No   Sig: Take 2 5 mg by nebulization every 6 (six) hours as needed for wheezing or shortness of breath   ibuprofen (MOTRIN) 600 mg tablet   Yes No   Sig: Take by mouth every 6 (six) hours as needed for mild pain   metoprolol tartrate (LOPRESSOR) 25 mg tablet   Yes No   Sig: Take 25 mg by mouth every 12 (twelve) hours   nitroglycerin (NITROSTAT) 0 3 mg SL tablet   Yes No   Sig: Place 0 3 mg under the tongue every 5 (five) minutes as needed for chest pain   omeprazole (PriLOSEC) 20 mg delayed release capsule   No No   Sig: Take 1 capsule (20 mg total) by mouth daily   predniSONE 50 mg tablet   No No   Sig: Take 1 tablet (50 mg total) by mouth daily   raNITIdine HCl (ZANTAC 75 PO)   Yes No   Sig: Take by mouth   simvastatin (ZOCOR) 20 mg tablet   Yes No   Sig: Take 20 mg by mouth daily at bedtime   sucralfate (CARAFATE) 1 g tablet   Yes No   Sig: Take 1 g by mouth 4 (four) times a day      Facility-Administered Medications: None       Past Medical History:   Diagnosis Date    Angina pectoris ([facility] [place] [facility] )     Asthma     Cancer ([facility] )     liver    Colon polyp     Coronary artery disease     Heart attack ([facility] [place] [facility] )     Hyperlipidemia     Hypertension     Liver disease     Shortness of breath        Past Surgical History:   Procedure Laterality Date    BACK SURGERY      CHOLECYSTECTOMY      COLONOSCOPY      CORONARY ANGIOPLASTY WITH STENT PLACEMENT      HERNIA REPAIR      KNEE ARTHROSCOPY      TONSILLECTOMY         History reviewed  No pertinent family history  I have reviewed and agree with the history as documented  Social History     Tobacco Use    Smoking status: Never Smoker    Smokeless tobacco: Never Used   Substance Use Topics    Alcohol use: Never     Frequency: Never    Drug use: Never        Review of Systems   Constitutional: Positive for fatigue  Negative for fever  HENT: Negative for sore throat  Respiratory: Negative for shortness of breath  Cardiovascular: Negative for chest pain  Gastrointestinal: Positive for abdominal pain, anorexia and diarrhea (some loose stools)  Negative for melena, nausea and vomiting  Genitourinary: Negative for hematuria  All other systems reviewed and are negative  Physical Exam  Physical Exam   Constitutional: He is oriented to person, place, and time  He appears well-developed and well-nourished  No distress  HENT:   Head: Normocephalic and atraumatic  Nose: Nose normal    Mouth/Throat: Oropharynx is clear and moist    Eyes: Conjunctivae and EOM are normal    Neck: Normal range of motion  Neck supple  Cardiovascular: Normal rate, regular rhythm and normal heart sounds  Pulmonary/Chest: Effort normal and breath sounds normal  No respiratory distress     Abdominal: Soft  There is tenderness in the suprapubic area and left lower quadrant  There is no rigidity, no rebound and no guarding  Musculoskeletal: Normal range of motion  He exhibits no edema  Neurological: He is alert and oriented to person, place, and time  He exhibits normal muscle tone  Skin: Skin is warm and dry  He is not diaphoretic  Psychiatric: He has a normal mood and affect  Nursing note and vitals reviewed        Vital Signs  ED Triage Vitals   Temperature Pulse Respirations Blood Pressure SpO2   01/01/20 1247 01/01/20 1245 01/01/20 1247 01/01/20 1247 01/01/20 1247   97 7 °F (36 5 °C) (!) 34 20 99/74 96 %      Temp Source Heart Rate Source Patient Position - Orthostatic VS BP Location FiO2 (%)   01/01/20 1247 01/01/20 1245 01/01/20 1245 01/01/20 1245 --   Oral Monitor Sitting Left arm       Pain Score       01/01/20 1245       Worst Possible Pain           Vitals:    01/01/20 1245 01/01/20 1247 01/01/20 1445   BP:  99/74 108/69   Pulse: (!) 34  72   Patient Position - Orthostatic VS: Sitting  Lying         Visual Acuity      ED Medications  Medications   amoxicillin-clavulanate (AUGMENTIN) 875-125 mg per tablet 1 tablet (has no administration in time range)   oxyCODONE-acetaminophen (PERCOCET) 5-325 mg per tablet 1 tablet (has no administration in time range)   morphine (PF) 4 mg/mL injection 4 mg (4 mg Intravenous Given 1/1/20 1405)   sodium chloride 0 9 % bolus 1,000 mL (1,000 mL Intravenous New Bag 1/1/20 1404)       Diagnostic Studies  Results Reviewed     Procedure Component Value Units Date/Time    Comprehensive metabolic panel [[id number]]  (Abnormal) Collected:  01/01/20 1317    Lab Status:  Final result Specimen:  Blood from Arm, Right Updated:  01/01/20 1346     Sodium 137 mmol/L      Potassium 4 4 mmol/L      Chloride 102 mmol/L      CO2 30 mmol/L      ANION GAP 5 mmol/L      BUN 12 mg/dL      Creatinine 1 01 mg/dL      Glucose 102 mg/dL      Calcium 8 9 mg/dL      AST 16 U/L      ALT 19 U/L Alkaline Phosphatase 56 U/L      Total Protein 7 3 g/dL      Albumin 3 9 g/dL      Total Bilirubin 1 50 mg/dL      eGFR 82 ml/min/1 73sq m     Narrative:       [place] guidelines for Chronic Kidney Disease (CKD):     Stage 1 with normal or high GFR (GFR > 90 mL/min/1 73 square meters)    Stage 2 Mild CKD (GFR = 60-89 mL/min/1 73 square meters)    Stage 3A Moderate CKD (GFR = 45-59 mL/min/1 73 square meters)    Stage 3B Moderate CKD (GFR = 30-44 mL/min/1 73 square meters)    Stage 4 Severe CKD (GFR = 15-29 mL/min/1 73 square meters)    Stage 5 End Stage CKD (GFR <15 mL/min/1 73 square meters)  Note: GFR calculation is accurate only with a steady state creatinine    Lipase [[id number]]  (Abnormal) Collected:  01/01/20 1317    Lab Status:  Final result Specimen:  Blood from Arm, Right Updated:  01/01/20 1346     Lipase 41 u/L     CBC and differential [[id number]]  (Abnormal) Collected:  01/01/20 1317    Lab Status:  Final result Specimen:  Blood from Arm, Right Updated:  01/01/20 1325     WBC 11 43 Thousand/uL      RBC 6 83 Million/uL      Hemoglobin 12 8 g/dL      Hematocrit 41 9 %      MCV 61 fL      MCH 18 7 pg      MCHC 30 5 g/dL      RDW 19 2 %      MPV 9 8 fL      Platelets 315 Thousands/uL      nRBC 0 /100 WBCs      Neutrophils Relative 73 %      Immat GRANS % 1 %      Lymphocytes Relative 17 %      Monocytes Relative 6 %      Eosinophils Relative 2 %      Basophils Relative 1 %      Neutrophils Absolute 8 55 Thousands/µL      Immature Grans Absolute 0 07 Thousand/uL      Lymphocytes Absolute 1 92 Thousands/µL      Monocytes Absolute 0 63 Thousand/µL      Eosinophils Absolute 0 20 Thousand/µL      Basophils Absolute 0 06 Thousands/µL     UA w Reflex to Microscopic w Reflex to Culture [[id number]] Collected:  01/01/20 1317    Lab Status:  Final result Specimen:  Urine, Clean Catch Updated:  01/01/20 1325     Color, UA Yellow     Clarity, UA Clear     Specific [place], UA 1 020 pH, UA 6 5     Leukocytes, UA Negative     Nitrite, UA Negative     Protein, UA Negative mg/dl      Glucose, UA Negative mg/dl      Ketones, UA Negative mg/dl      Urobilinogen, UA 0 2 E U /dl      Bilirubin, UA Negative     Blood, UA Negative                 CT abdomen pelvis wo contrast   Final Result by Cruz Kang DO (01/01 1514)      1  Inflammatory changes at the junction of the descending and sigmoid colon  Differential considerations include acute diverticulitis versus epiploic appendagitis  No abscess or free air  2   6 5 cm indeterminate lesions in both right and left hepatic lobes  Nonemergent follow-up MRI abdomen with IV contrast ([facility]) recommended for further evaluation  Limited study without oral or IV contrast       The study was marked in EPIC for immediate notification  Workstation performed: [id number]                    Procedures  ECG 12 Lead Documentation Only  Date/Time: 1/1/2020 1:07 PM  Performed by: Ginna Balderas MD  Authorized by: Ginna Balderas MD     Indications / Diagnosis:  Bradycardia  Rate:     ECG rate:  75    ECG rate assessment: normal    Rhythm:     Rhythm: sinus rhythm    Ectopy:     Ectopy: PVCs               ED Course                               MDM  Number of Diagnoses or Management Options  Diverticulitis: new and requires workup     Amount and/or Complexity of Data Reviewed  Clinical lab tests: ordered and reviewed  Tests in the radiology section of CPT®: ordered and reviewed  Independent visualization of images, tracings, or specimens: yes    Patient Progress  Patient progress: stable (D/w pt his CT scan results and pt had two episodes of diarrhea while here so sx c/w pt and will treat for diverticulitis  D/w him f/u with PCP and other f/u studies  D/w him cipro/flagyl or augmentin for diverticulitis and he states that he has taken amox without any issues in the past and prefers the one drug easier dosing   Requesting a few doses of pain meds until pain improved  PDMP reviewed and has few scripts for pain meds from PCP but no other meds  I have reasonably determine that electronically prescribing a controlled substance would be impractical for the patient to obtain the controlled substance prescribed by electronic prescription or would cause an untimely delay resulting in an adverse impact on the patient's medical condition  )        Disposition  Final diagnoses:   Diverticulitis     Time reflects when diagnosis was documented in both MDM as applicable and the Disposition within this note     Time User Action Codes Description Comment    1/1/2020  3:50 PM Letty Craven, [address] [[address] [id number]] Diverticulitis       ED Disposition     ED Disposition Condition Date/Time Comment    Discharge Stable Wed Jan 1, 2020  8:61 PM Ty Mcgowan discharge to home/self care  Follow-up Information     Follow up With Specialties Details Why Contact Info Additional [address] [facility] Emergency Department Emergency Medicine Go to  If symptoms worsen [address] ED, [address] [place] [address], [id number]          Patient's Medications   Discharge Prescriptions    AMOXICILLIN-CLAVULANATE (AUGMENTIN) 875-125 MG PER TABLET    Take 1 tablet by mouth every 12 (twelve) hours for 10 days       Start Date: 1/1/2020  End Date: 1/11/2020       Order Dose: 1 tablet       Quantity: 20 tablet    Refills: 0    OXYCODONE-ACETAMINOPHEN (PERCOCET) 5-325 MG PER TABLET    Take 1 tablet by mouth every 4 (four) hours as needed for moderate pain for up to 10 daysMax Daily Amount: 6 tablets       Start Date: 1/1/2020  End Date: 1/11/2020       Order Dose: 1 tablet       Quantity: 12 tablet    Refills: 0     No discharge procedures on file      ED Provider  Electronically Signed by           Gaby Guaman MD  01/01/20 9156

## 2020-02-24 ENCOUNTER — HOSPITAL ENCOUNTER (EMERGENCY)
Facility: HOSPITAL | Age: 60
Discharge: HOME | End: 2020-02-24
Attending: EMERGENCY MEDICINE
Payer: COMMERCIAL

## 2020-02-24 ENCOUNTER — APPOINTMENT (EMERGENCY)
Dept: CT IMAGING | Facility: HOSPITAL | Age: 60
End: 2020-02-24
Payer: COMMERCIAL

## 2020-02-24 VITALS
OXYGEN SATURATION: 97 % | DIASTOLIC BLOOD PRESSURE: 79 MMHG | WEIGHT: 199.08 LBS | BODY MASS INDEX: 31.18 KG/M2 | HEART RATE: 76 BPM | TEMPERATURE: 98.4 F | SYSTOLIC BLOOD PRESSURE: 132 MMHG | RESPIRATION RATE: 18 BRPM

## 2020-02-24 DIAGNOSIS — R10.9 NONSPECIFIC ABDOMINAL PAIN: Primary | ICD-10-CM

## 2020-02-24 LAB
ALBUMIN SERPL BCP-MCNC: 3.7 G/DL (ref 3.5–5)
ALP SERPL-CCNC: 56 U/L (ref 46–116)
ALT SERPL W P-5'-P-CCNC: 14 U/L (ref 12–78)
ANION GAP SERPL CALCULATED.3IONS-SCNC: 8 MMOL/L (ref 4–13)
AST SERPL W P-5'-P-CCNC: 23 U/L (ref 5–45)
BASOPHILS # BLD AUTO: 0.04 THOUSANDS/ΜL (ref 0–0.1)
BASOPHILS NFR BLD AUTO: 0 % (ref 0–1)
BILIRUB SERPL-MCNC: 1 MG/DL (ref 0.2–1)
BUN SERPL-MCNC: 16 MG/DL (ref 5–25)
CALCIUM SERPL-MCNC: 9 MG/DL (ref 8.3–10.1)
CHLORIDE SERPL-SCNC: 104 MMOL/L (ref 100–108)
CO2 SERPL-SCNC: 28 MMOL/L (ref 21–32)
CREAT SERPL-MCNC: 1.04 MG/DL (ref 0.6–1.3)
EOSINOPHIL # BLD AUTO: 0.22 THOUSAND/ΜL (ref 0–0.61)
EOSINOPHIL NFR BLD AUTO: 2 % (ref 0–6)
ERYTHROCYTE [DISTWIDTH] IN BLOOD BY AUTOMATED COUNT: 18.7 % (ref 11.6–15.1)
GFR SERPL CREATININE-BSD FRML MDRD: 79 ML/MIN/1.73SQ M
GLUCOSE SERPL-MCNC: 139 MG/DL (ref 65–140)
HCT VFR BLD AUTO: 35 % (ref 36.5–49.3)
HGB BLD-MCNC: 10.9 G/DL (ref 12–17)
IMM GRANULOCYTES # BLD AUTO: 0.04 THOUSAND/UL (ref 0–0.2)
IMM GRANULOCYTES NFR BLD AUTO: 0 % (ref 0–2)
LIPASE SERPL-CCNC: 65 U/L (ref 73–393)
LYMPHOCYTES # BLD AUTO: 2.32 THOUSANDS/ΜL (ref 0.6–4.47)
LYMPHOCYTES NFR BLD AUTO: 23 % (ref 14–44)
MCH RBC QN AUTO: 18.9 PG (ref 26.8–34.3)
MCHC RBC AUTO-ENTMCNC: 31.1 G/DL (ref 31.4–37.4)
MCV RBC AUTO: 61 FL (ref 82–98)
MONOCYTES # BLD AUTO: 0.55 THOUSAND/ΜL (ref 0.17–1.22)
MONOCYTES NFR BLD AUTO: 6 % (ref 4–12)
NEUTROPHILS # BLD AUTO: 6.76 THOUSANDS/ΜL (ref 1.85–7.62)
NEUTS SEG NFR BLD AUTO: 69 % (ref 43–75)
NRBC BLD AUTO-RTO: 0 /100 WBCS
PLATELET # BLD AUTO: 281 THOUSANDS/UL (ref 149–390)
PMV BLD AUTO: 10.1 FL (ref 8.9–12.7)
POTASSIUM SERPL-SCNC: 3.5 MMOL/L (ref 3.5–5.3)
PROT SERPL-MCNC: 6.9 G/DL (ref 6.4–8.2)
RBC # BLD AUTO: 5.76 MILLION/UL (ref 3.88–5.62)
SODIUM SERPL-SCNC: 140 MMOL/L (ref 136–145)
WBC # BLD AUTO: 9.93 THOUSAND/UL (ref 4.31–10.16)

## 2020-02-24 PROCEDURE — 85025 COMPLETE CBC W/AUTO DIFF WBC: CPT | Performed by: EMERGENCY MEDICINE

## 2020-02-24 PROCEDURE — 99284 EMERGENCY DEPT VISIT MOD MDM: CPT

## 2020-02-24 PROCEDURE — 83690 ASSAY OF LIPASE: CPT | Performed by: EMERGENCY MEDICINE

## 2020-02-24 PROCEDURE — 99285 EMERGENCY DEPT VISIT HI MDM: CPT | Performed by: PHYSICIAN ASSISTANT

## 2020-02-24 PROCEDURE — 96375 TX/PRO/DX INJ NEW DRUG ADDON: CPT

## 2020-02-24 PROCEDURE — 96374 THER/PROPH/DIAG INJ IV PUSH: CPT

## 2020-02-24 PROCEDURE — 80053 COMPREHEN METABOLIC PANEL: CPT | Performed by: EMERGENCY MEDICINE

## 2020-02-24 PROCEDURE — 74176 CT ABD & PELVIS W/O CONTRAST: CPT

## 2020-02-24 PROCEDURE — 36415 COLL VENOUS BLD VENIPUNCTURE: CPT | Performed by: EMERGENCY MEDICINE

## 2020-02-24 RX ORDER — KETOROLAC TROMETHAMINE 30 MG/ML
15 INJECTION, SOLUTION INTRAMUSCULAR; INTRAVENOUS ONCE
Status: COMPLETED | OUTPATIENT
Start: 2020-02-24 | End: 2020-02-24

## 2020-02-24 RX ORDER — OXYCODONE HYDROCHLORIDE AND ACETAMINOPHEN 5; 325 MG/1; MG/1
1 TABLET ORAL EVERY 8 HOURS PRN
Qty: 9 TABLET | Refills: 0 | Status: SHIPPED | OUTPATIENT
Start: 2020-02-24 | End: 2021-12-18

## 2020-02-24 RX ORDER — MORPHINE SULFATE 4 MG/ML
4 INJECTION, SOLUTION INTRAMUSCULAR; INTRAVENOUS ONCE
Status: COMPLETED | OUTPATIENT
Start: 2020-02-24 | End: 2020-02-24

## 2020-02-24 RX ORDER — HYDROMORPHONE HCL/PF 1 MG/ML
1 SYRINGE (ML) INJECTION ONCE
Status: COMPLETED | OUTPATIENT
Start: 2020-02-24 | End: 2020-02-24

## 2020-02-24 RX ORDER — AMOXICILLIN AND CLAVULANATE POTASSIUM 875; 125 MG/1; MG/1
1 TABLET, FILM COATED ORAL 2 TIMES DAILY
Qty: 20 TABLET | Refills: 0 | Status: SHIPPED | OUTPATIENT
Start: 2020-02-24 | End: 2020-03-05

## 2020-02-24 RX ADMIN — MORPHINE SULFATE 4 MG: 4 INJECTION INTRAVENOUS at 20:48

## 2020-02-24 RX ADMIN — KETOROLAC TROMETHAMINE 15 MG: 30 INJECTION, SOLUTION INTRAMUSCULAR at 19:06

## 2020-02-24 RX ADMIN — HYDROMORPHONE HYDROCHLORIDE 1 MG: 1 INJECTION, SOLUTION INTRAMUSCULAR; INTRAVENOUS; SUBCUTANEOUS at 21:34

## 2020-02-24 NOTE — ED PROVIDER NOTES
History  Chief Complaint   Patient presents with    Abdominal Pain     onset yesterday, co of sharp lower abd pain r/t the back , had similar episode 2 months ago, + diarrhea     56-year-old male with abdominal pain  Lower abdomen  Started last night became more severe today  Diffuse across lower abdomen  Radiates to back  Described as sharp pain  Associated with 2-3 episodes of diarrhea  States he had pain similar to this earlier this year was diagnosed with diverticulitis  Today feels more severe  No nausea no fever no chills  No new or unusual foods  No recent antibiotics  History provided by:  Patient   used: No    Abdominal Pain   Pain location:  LLQ and RLQ  Pain quality: sharp    Pain radiates to:  R flank and L flank  Pain severity:  Severe  Onset quality:  Gradual  Duration:  1 day  Timing:  Constant  Progression:  Unchanged  Chronicity:  New  Context: not alcohol use, not awakening from sleep, not diet changes, not eating, not laxative use, not medication withdrawal, not previous surgeries, not recent illness, not recent sexual activity, not recent travel, not retching, not sick contacts, not suspicious food intake and not trauma    Relieved by:  Nothing  Worsened by:  Nothing  Ineffective treatments:  None tried  Associated symptoms: diarrhea    Associated symptoms: no anorexia, no belching, no chest pain, no chills, no constipation, no cough, no dysuria, no fatigue, no fever, no flatus, no hematemesis, no hematochezia, no hematuria, no melena, no nausea, no shortness of breath, no sore throat and no vomiting        Prior to Admission Medications   Prescriptions Last Dose Informant Patient Reported? Taking?    EPINEPHrine (EPIPEN) 0 3 mg/0 3 mL SOAJ   No No   Sig: Inject 0 3 mL (0 3 mg total) into a muscle once for 1 dose   albuterol (2 5 mg/3 mL) 0 083 % nebulizer solution 2/24/2020 at Unknown time  Yes Yes   Sig: Take 2 5 mg by nebulization every 6 (six) hours as needed for wheezing or shortness of breath   ibuprofen (MOTRIN) 600 mg tablet Unknown at Unknown time  Yes No   Sig: Take by mouth every 6 (six) hours as needed for mild pain   metoprolol tartrate (LOPRESSOR) 25 mg tablet 2/24/2020 at Unknown time  Yes Yes   Sig: Take 25 mg by mouth every 12 (twelve) hours   nitroglycerin (NITROSTAT) 0 3 mg SL tablet Unknown at Unknown time  Yes No   Sig: Place 0 3 mg under the tongue every 5 (five) minutes as needed for chest pain   omeprazole (PriLOSEC) 20 mg delayed release capsule 2/24/2020 at Unknown time  No Yes   Sig: Take 1 capsule (20 mg total) by mouth daily   predniSONE 50 mg tablet Past Month at Unknown time  No Yes   Sig: Take 1 tablet (50 mg total) by mouth daily   raNITIdine HCl (ZANTAC 75 PO) 2/24/2020 at Unknown time  Yes Yes   Sig: Take by mouth   simvastatin (ZOCOR) 20 mg tablet 2/24/2020 at Unknown time  Yes Yes   Sig: Take 20 mg by mouth daily at bedtime   sucralfate (CARAFATE) 1 g tablet 2/24/2020 at Unknown time  Yes Yes   Sig: Take 1 g by mouth 4 (four) times a day      Facility-Administered Medications: None       Past Medical History:   Diagnosis Date    Angina pectoris (HCC)     Asthma     [facility]     liver    Colon polyp     Coronary artery disease     [facility] ([facility]     Hyperlipidemia     Hypertension     Liver disease     Shortness of breath        Past Surgical History:   Procedure Laterality Date    BACK SURGERY      CHOLECYSTECTOMY      COLONOSCOPY      CORONARY ANGIOPLASTY WITH STENT PLACEMENT      HERNIA REPAIR      KNEE ARTHROSCOPY      TONSILLECTOMY         History reviewed  No pertinent family history  I have reviewed and agree with the history as documented      E-Cigarette/Vaping     E-Cigarette/Vaping Substances     Social History     Tobacco Use    Smoking status: Never Smoker    Smokeless tobacco: Never Used   Substance Use Topics    Alcohol use: Never     Frequency: Never    Drug use: Never       Review of Systems   Constitutional: Negative for activity change, appetite change, chills, diaphoresis, fatigue, fever and unexpected weight change  HENT: Negative for congestion, rhinorrhea, sinus pressure, sore throat and trouble swallowing  Eyes: Negative for photophobia and visual disturbance  Respiratory: Negative for apnea, cough, choking, chest tightness, shortness of breath, wheezing and stridor  Cardiovascular: Negative for chest pain, palpitations and leg swelling  Gastrointestinal: Positive for abdominal pain and diarrhea  Negative for abdominal distention, anorexia, blood in stool, constipation, flatus, hematemesis, hematochezia, melena, nausea and vomiting  Genitourinary: Negative for decreased urine volume, difficulty urinating, dysuria, enuresis, flank pain, frequency, hematuria and urgency  Musculoskeletal: Negative for arthralgias, myalgias, neck pain and neck stiffness  Skin: Negative for color change, pallor, rash and wound  Allergic/Immunologic: Negative  Neurological: Negative for dizziness, tremors, syncope, weakness, light-headedness, numbness and headaches  Hematological: Negative  Psychiatric/Behavioral: Negative  All other systems reviewed and are negative  Physical Exam  Physical Exam   Constitutional: He is oriented to person, place, and time  He appears well-developed and well-nourished  Non-toxic appearance  He does not have a sickly appearance  He does not appear ill  No distress  HENT:   Head: Normocephalic and atraumatic  Eyes: Pupils are equal, round, and reactive to light  EOM and lids are normal    Neck: Normal range of motion  Neck supple  Cardiovascular: Normal rate, regular rhythm, S1 normal, S2 normal, normal heart sounds, intact distal pulses and normal pulses  Exam reveals no gallop, no distant heart sounds, no friction rub and no decreased pulses  No murmur heard    Pulses:       Radial pulses are 2+ on the right side, and 2+ on the left side    Pulmonary/Chest: Effort normal and breath sounds normal  No accessory muscle usage  No apnea, no tachypnea and no bradypnea  No respiratory distress  He has no decreased breath sounds  He has no wheezes  He has no rhonchi  He has no rales  Abdominal: Soft  Normal appearance  He exhibits no distension  There is tenderness in the right lower quadrant, periumbilical area, suprapubic area and left lower quadrant  There is no rigidity, no rebound and no guarding  Musculoskeletal: Normal range of motion  He exhibits no edema, tenderness or deformity  Neurological: He is alert and oriented to person, place, and time  No cranial nerve deficit  GCS eye subscore is 4  GCS verbal subscore is 5  GCS motor subscore is 6  Skin: Skin is warm, dry and intact  No rash noted  He is not diaphoretic  No erythema  No pallor  Psychiatric: His speech is normal    Nursing note and vitals reviewed        Vital Signs  ED Triage Vitals   Temperature Pulse Respirations Blood Pressure SpO2   02/24/20 1722 02/24/20 1722 02/24/20 1722 02/24/20 1852 02/24/20 1722   98 4 °F (36 9 °C) 60 18 119/61 96 %      Temp Source Heart Rate Source Patient Position - Orthostatic VS BP Location FiO2 (%)   02/24/20 1722 02/24/20 1722 02/24/20 1722 02/24/20 1722 --   Oral Monitor Sitting Left arm       Pain Score       02/24/20 2048       Worst Possible Pain           Vitals:    02/24/20 1722 02/24/20 1852 02/24/20 2046   BP:  119/61 132/79   Pulse: 60 64 76   Patient Position - Orthostatic VS: Sitting  Lying         Visual Acuity      ED Medications  Medications   ketorolac (TORADOL) injection 15 mg (15 mg Intravenous Given 2/24/20 1906)   morphine (PF) 4 mg/mL injection 4 mg (4 mg Intravenous Given 2/24/20 2048)   HYDROmorphone (DILAUDID) injection 1 mg (1 mg Intravenous Given 2/24/20 2134)       Diagnostic Studies  Results Reviewed     Procedure Component Value Units Date/Time    Comprehensive metabolic panel [[id number]] Collected:  02/24/20 1850    Lab Status:  Final result Specimen:  Blood from Arm, Right Updated:  02/24/20 1919     Sodium 140 mmol/L      Potassium 3 5 mmol/L      Chloride 104 mmol/L      CO2 28 mmol/L      ANION GAP 8 mmol/L      BUN 16 mg/dL      Creatinine 1 04 mg/dL      Glucose 139 mg/dL      Calcium 9 0 mg/dL      AST 23 U/L      ALT 14 U/L      Alkaline Phosphatase 56 U/L      Total Protein 6 9 g/dL      Albumin 3 7 g/dL      Total Bilirubin 1 00 mg/dL      eGFR 79 ml/min/1 73sq m     Narrative:       [employer] guidelines for Chronic Kidney Disease (CKD):     Stage 1 with normal or high GFR (GFR > 90 mL/min/1 73 square meters)    Stage 2 Mild CKD (GFR = 60-89 mL/min/1 73 square meters)    Stage 3A Moderate CKD (GFR = 45-59 mL/min/1 73 square meters)    Stage 3B Moderate CKD (GFR = 30-44 mL/min/1 73 square meters)    Stage 4 Severe CKD (GFR = 15-29 mL/min/1 73 square meters)    Stage 5 End Stage CKD (GFR <15 mL/min/1 73 square meters)  Note: GFR calculation is accurate only with a steady state creatinine    Lipase [[id number]]  (Abnormal) Collected:  02/24/20 1850    Lab Status:  Final result Specimen:  Blood from Arm, Right Updated:  02/24/20 1919     Lipase 65 u/L     CBC and differential [[id number]]  (Abnormal) Collected:  02/24/20 1850    Lab Status:  Final result Specimen:  Blood from Arm, Right Updated:  02/24/20 1906     WBC 9 93 Thousand/uL      RBC 5 76 Million/uL      Hemoglobin 10 9 g/dL      Hematocrit 35 0 %      MCV 61 fL      MCH 18 9 pg      MCHC 31 1 g/dL      RDW 18 7 %      MPV 10 1 fL      Platelets 696 Thousands/uL      nRBC 0 /100 WBCs      Neutrophils Relative 69 %      Immat GRANS % 0 %      Lymphocytes Relative 23 %      Monocytes Relative 6 %      Eosinophils Relative 2 %      Basophils Relative 0 %      Neutrophils Absolute 6 76 Thousands/µL      Immature Grans Absolute 0 04 Thousand/uL      Lymphocytes Absolute 2 32 Thousands/µL      Monocytes Absolute 0 55 Thousand/µL Eosinophils Absolute 0 22 Thousand/µL      Basophils Absolute 0 04 Thousands/µL                  CT abdomen pelvis wo contrast   Final Result by Josse Newsome MD (02/24 2102)      No acute inflammatory changes in the abdomen or the pelvis  Known right and left hepatic masses  Follow-up with multiphasic liver MRI or liver CT scan  Workstation performed: [id number]                    Procedures  Procedures         ED Course  ED Course as of Feb 24 2209   Mon Feb 24, 2020   2151 Pain resolved  Pt called his nephew to pick him up as the pt now wants to go home  MDM  Number of Diagnoses or Management Options  Nonspecific abdominal pain: new and requires workup  Diagnosis management comments: DDx including but not limited to: food poisoning, viral illness, colitis, enteritis, metabolic abnormality, IBS, IBD, ileus, bowel obstruction, appendicitis, pancreatitis, hepatitis, mesenteric adenitis, mesenteric ischemia, toxic megacolon, cholecystitis, biliary colic, pyelonephritis, UTI, renal colic  Plan: CT a/p  Labs  Analgesia  Fluids  dispo pending  Amount and/or Complexity of Data Reviewed  Clinical lab tests: ordered and reviewed  Tests in the radiology section of CPT®: reviewed and ordered  Independent visualization of images, tracings, or specimens: yes    Risk of Complications, Morbidity, and/or Mortality  Presenting problems: moderate  Management options: low  General comments: 20-year-old male patient here for evaluation of abdominal pain  History exam suspicious for acute diverticulitis  CT obtained, unfortunately unable to use contrast given IV contrast allergy  No obvious abnormalities  Given higher clinical suspicion for diverticulitis will treat as such  Started on Augmentin  He notes history of anaphylaxis to penicillins however he states when he had his Augmentin last time he had no issues with this    He states in fact to work within 3 days of starting  Discharge home with short prescription for Percocet  Discussed return parameters  Follow up with family physician  Patient understands and agrees the plan  He has a ride home as he was given narcotics here in the ED  Patient Progress  Patient progress: stable        Disposition  Final diagnoses:   Nonspecific abdominal pain     Time reflects when diagnosis was documented in both MDM as applicable and the Disposition within this note     Time User Action Codes Description Comment    2/24/2020  9:58 PM Yumiko Bauer Add [R10 9] Nonspecific abdominal pain       ED Disposition     ED Disposition Condition Date/Time Comment    Discharge Stable Mon Feb 24, 2020  8:48 PM Ed Palmer discharge to home/self care  Follow-up Information     Follow up With Specialties Details Why Contact Info    Cali Gillette MD  Call in 1 day for routine follow up [address]   [phone]            Patient's Medications   Discharge Prescriptions    AMOXICILLIN-CLAVULANATE (AUGMENTIN) 875-125 MG PER TABLET    Take 1 tablet by mouth 2 (two) times a day for 10 days       Start Date: 2/24/2020 End Date: 3/5/2020       Order Dose: 1 tablet       Quantity: 20 tablet    Refills: 0    OXYCODONE-ACETAMINOPHEN (PERCOCET) 5-325 MG PER TABLET    Take 1 tablet by mouth every 8 (eight) hours as needed for moderate painMax Daily Amount: 3 tablets       Start Date: 2/24/2020 End Date: --       Order Dose: 1 tablet       Quantity: 9 tablet    Refills: 0     No discharge procedures on file      PDMP Review     None          ED Provider  Electronically Signed by           Amrita Bhandari PA-C  02/24/20 2422

## 2020-03-27 ENCOUNTER — APPOINTMENT (EMERGENCY)
Dept: ULTRASOUND IMAGING | Facility: HOSPITAL | Age: 60
End: 2020-03-27
Payer: COMMERCIAL

## 2020-03-27 ENCOUNTER — HOSPITAL ENCOUNTER (EMERGENCY)
Facility: HOSPITAL | Age: 60
Discharge: HOME | End: 2020-03-27
Attending: EMERGENCY MEDICINE | Admitting: EMERGENCY MEDICINE
Payer: COMMERCIAL

## 2020-03-27 VITALS
TEMPERATURE: 97.5 F | WEIGHT: 190 LBS | OXYGEN SATURATION: 95 % | RESPIRATION RATE: 16 BRPM | HEART RATE: 72 BPM | BODY MASS INDEX: 29.82 KG/M2 | HEIGHT: 67 IN | DIASTOLIC BLOOD PRESSURE: 53 MMHG | SYSTOLIC BLOOD PRESSURE: 100 MMHG

## 2020-03-27 DIAGNOSIS — R10.9 RIGHT FLANK PAIN: Primary | ICD-10-CM

## 2020-03-27 LAB
ALBUMIN SERPL BCP-MCNC: 4 G/DL (ref 3.5–5)
ALP SERPL-CCNC: 57 U/L (ref 46–116)
ALT SERPL W P-5'-P-CCNC: 21 U/L (ref 12–78)
ANION GAP SERPL CALCULATED.3IONS-SCNC: 6 MMOL/L (ref 4–13)
AST SERPL W P-5'-P-CCNC: 18 U/L (ref 5–45)
BACTERIA UR QL AUTO: ABNORMAL /HPF
BASOPHILS # BLD AUTO: 0.04 THOUSANDS/ΜL (ref 0–0.1)
BASOPHILS NFR BLD AUTO: 1 % (ref 0–1)
BILIRUB SERPL-MCNC: 1.6 MG/DL (ref 0.2–1)
BILIRUB UR QL STRIP: NEGATIVE
BUN SERPL-MCNC: 19 MG/DL (ref 5–25)
CALCIUM SERPL-MCNC: 8.9 MG/DL (ref 8.3–10.1)
CHLORIDE SERPL-SCNC: 99 MMOL/L (ref 100–108)
CLARITY UR: CLEAR
CO2 SERPL-SCNC: 28 MMOL/L (ref 21–32)
COLOR UR: YELLOW
CREAT SERPL-MCNC: 1.01 MG/DL (ref 0.6–1.3)
EOSINOPHIL # BLD AUTO: 0.04 THOUSAND/ΜL (ref 0–0.61)
EOSINOPHIL NFR BLD AUTO: 1 % (ref 0–6)
ERYTHROCYTE [DISTWIDTH] IN BLOOD BY AUTOMATED COUNT: 18.9 % (ref 11.6–15.1)
GFR SERPL CREATININE-BSD FRML MDRD: 82 ML/MIN/1.73SQ M
GLUCOSE SERPL-MCNC: 115 MG/DL (ref 65–140)
GLUCOSE UR STRIP-MCNC: NEGATIVE MG/DL
HCT VFR BLD AUTO: 36.6 % (ref 36.5–49.3)
HGB BLD-MCNC: 11.4 G/DL (ref 12–17)
HGB UR QL STRIP.AUTO: ABNORMAL
IMM GRANULOCYTES # BLD AUTO: 0.04 THOUSAND/UL (ref 0–0.2)
IMM GRANULOCYTES NFR BLD AUTO: 1 % (ref 0–2)
KETONES UR STRIP-MCNC: NEGATIVE MG/DL
LEUKOCYTE ESTERASE UR QL STRIP: NEGATIVE
LYMPHOCYTES # BLD AUTO: 1.03 THOUSANDS/ΜL (ref 0.6–4.47)
LYMPHOCYTES NFR BLD AUTO: 15 % (ref 14–44)
MCH RBC QN AUTO: 18.8 PG (ref 26.8–34.3)
MCHC RBC AUTO-ENTMCNC: 31.1 G/DL (ref 31.4–37.4)
MCV RBC AUTO: 60 FL (ref 82–98)
MONOCYTES # BLD AUTO: 1.16 THOUSAND/ΜL (ref 0.17–1.22)
MONOCYTES NFR BLD AUTO: 17 % (ref 4–12)
MUCOUS THREADS UR QL AUTO: ABNORMAL
NEUTROPHILS # BLD AUTO: 4.52 THOUSANDS/ΜL (ref 1.85–7.62)
NEUTS SEG NFR BLD AUTO: 65 % (ref 43–75)
NITRITE UR QL STRIP: NEGATIVE
NON-SQ EPI CELLS URNS QL MICRO: ABNORMAL /HPF
NRBC BLD AUTO-RTO: 1 /100 WBCS
OTHER STN SPEC: ABNORMAL
PH UR STRIP.AUTO: 6 [PH]
PLATELET # BLD AUTO: 263 THOUSANDS/UL (ref 149–390)
PMV BLD AUTO: 9.9 FL (ref 8.9–12.7)
POTASSIUM SERPL-SCNC: 3.8 MMOL/L (ref 3.5–5.3)
PROT SERPL-MCNC: 7.3 G/DL (ref 6.4–8.2)
PROT UR STRIP-MCNC: NEGATIVE MG/DL
RBC # BLD AUTO: 6.06 MILLION/UL (ref 3.88–5.62)
RBC #/AREA URNS AUTO: ABNORMAL /HPF
SODIUM SERPL-SCNC: 133 MMOL/L (ref 136–145)
SP GR UR STRIP.AUTO: 1.02 (ref 1–1.03)
UROBILINOGEN UR QL STRIP.AUTO: 0.2 E.U./DL
WBC # BLD AUTO: 6.83 THOUSAND/UL (ref 4.31–10.16)
WBC #/AREA URNS AUTO: ABNORMAL /HPF

## 2020-03-27 PROCEDURE — 76705 ECHO EXAM OF ABDOMEN: CPT

## 2020-03-27 PROCEDURE — 36415 COLL VENOUS BLD VENIPUNCTURE: CPT | Performed by: NURSE PRACTITIONER

## 2020-03-27 PROCEDURE — 85025 COMPLETE CBC W/AUTO DIFF WBC: CPT | Performed by: NURSE PRACTITIONER

## 2020-03-27 PROCEDURE — 80053 COMPREHEN METABOLIC PANEL: CPT | Performed by: NURSE PRACTITIONER

## 2020-03-27 PROCEDURE — 99284 EMERGENCY DEPT VISIT MOD MDM: CPT | Performed by: NURSE PRACTITIONER

## 2020-03-27 PROCEDURE — 96374 THER/PROPH/DIAG INJ IV PUSH: CPT

## 2020-03-27 PROCEDURE — 81001 URINALYSIS AUTO W/SCOPE: CPT | Performed by: NURSE PRACTITIONER

## 2020-03-27 PROCEDURE — 99284 EMERGENCY DEPT VISIT MOD MDM: CPT

## 2020-03-27 RX ORDER — HYDROCODONE BITARTRATE AND ACETAMINOPHEN 5; 325 MG/1; MG/1
1 TABLET ORAL EVERY 6 HOURS PRN
Qty: 12 TABLET | Refills: 0 | Status: SHIPPED | OUTPATIENT
Start: 2020-03-27 | End: 2021-12-18

## 2020-03-27 RX ADMIN — MORPHINE SULFATE 2 MG: 2 INJECTION, SOLUTION INTRAMUSCULAR; INTRAVENOUS at 10:01

## 2020-03-27 NOTE — ED PROVIDER NOTES
History  Chief Complaint   Patient presents with    Flank Pain     pt with right sided flank pain since 330pm yesterday      This is a 52-year-old male patient presents here with a chief complaint of right-sided back/flank pain  He has had the symptoms starting yesterday  They have been waxing and waning since that time  Has not taken anything for the discomfort  The discomfort is somewhat reproducible with percussion of the flank  Although had recent CT scan that did not show any renal calculi so risk of calculi related pain is lower  Also has a history of cholecystectomy so that lowers the risk of any sort of biliary obstruction  He has previous possible cyst on imaging of his liver however recommendations for contrast enhanced MRI have not been carried out  The patient reports a shellfish allergy and reports about 20 years ago he had a reaction to contrast and MRI  After discussion with the radiologist he feels we could discern whether these are hepatic cyst with right upper quadrant ultrasound ultrasound  Will also look for other right upper quadrant findings  This may be musculoskeletal nature however  Additional fever chills  No cough  Flank Pain   Associated symptoms: no chest pain, no cough, no diarrhea, no dysuria, no fatigue, no fever, no shortness of breath, no sore throat and no vomiting        Prior to Admission Medications   Prescriptions Last Dose Informant Patient Reported? Taking?    EPINEPHrine (EPIPEN) 0 3 mg/0 3 mL SOAJ   No No   Sig: Inject 0 3 mL (0 3 mg total) into a muscle once for 1 dose   albuterol (2 5 mg/3 mL) 0 083 % nebulizer solution   Yes No   Sig: Take 2 5 mg by nebulization every 6 (six) hours as needed for wheezing or shortness of breath   ibuprofen (MOTRIN) 600 mg tablet   Yes No   Sig: Take by mouth every 6 (six) hours as needed for mild pain   metoprolol tartrate (LOPRESSOR) 25 mg tablet   Yes No   Sig: Take 25 mg by mouth every 12 (twelve) hours nitroglycerin (NITROSTAT) 0 3 mg SL tablet   Yes No   Sig: Place 0 3 mg under the tongue every 5 (five) minutes as needed for chest pain   omeprazole (PriLOSEC) 20 mg delayed release capsule   No No   Sig: Take 1 capsule (20 mg total) by mouth daily   oxyCODONE-acetaminophen (PERCOCET) 5-325 mg per tablet   No No   Sig: Take 1 tablet by mouth every 8 (eight) hours as needed for moderate painMax Daily Amount: 3 tablets   predniSONE 50 mg tablet   No No   Sig: Take 1 tablet (50 mg total) by mouth daily   raNITIdine HCl (ZANTAC 75 PO)   Yes No   Sig: Take by mouth   simvastatin (ZOCOR) 20 mg tablet   Yes No   Sig: Take 20 mg by mouth daily at bedtime   sucralfate (CARAFATE) 1 g tablet   Yes No   Sig: Take 1 g by mouth 4 (four) times a day      Facility-Administered Medications: None       Past Medical History:   Diagnosis Date    Angina pectoris (HCC)     Asthma     Cancer ([facility] )     liver    Colon polyp     Coronary artery disease     Heart attack ([facility] )     Hyperlipidemia     Hypertension     Liver disease     Shortness of breath        Past Surgical History:   Procedure Laterality Date    BACK SURGERY      CHOLECYSTECTOMY      COLONOSCOPY      CORONARY ANGIOPLASTY WITH STENT PLACEMENT      HERNIA REPAIR      KNEE ARTHROSCOPY      TONSILLECTOMY         History reviewed  No pertinent family history  I have reviewed and agree with the history as documented  E-Cigarette/Vaping     E-Cigarette/Vaping Substances     Social History     Tobacco Use    Smoking status: Never Smoker    Smokeless tobacco: Never Used   Substance Use Topics    Alcohol use: Never     Frequency: Never    Drug use: Never       Review of Systems   Constitutional: Negative for diaphoresis, fatigue and fever  HENT: Negative for congestion, ear pain, nosebleeds and sore throat  Eyes: Negative for photophobia, pain, discharge and visual disturbance     Respiratory: Negative for cough, choking, chest tightness, shortness of breath and wheezing  Cardiovascular: Negative for chest pain and palpitations  Gastrointestinal: Negative for abdominal distention, abdominal pain, diarrhea and vomiting  Genitourinary: Positive for flank pain  Negative for dysuria and frequency  Musculoskeletal: Negative for back pain, gait problem and joint swelling  Skin: Negative for color change and rash  Neurological: Negative for dizziness, syncope and headaches  Psychiatric/Behavioral: Negative for behavioral problems and confusion  The patient is not nervous/anxious  All other systems reviewed and are negative  Physical Exam  Physical Exam   Constitutional: He is oriented to person, place, and time  He appears well-developed and well-nourished  HENT:   Head: Normocephalic and atraumatic  Eyes: Pupils are equal, round, and reactive to light  Neck: Normal range of motion  Neck supple  Cardiovascular: Normal rate, regular rhythm, normal heart sounds and normal pulses  PMI is not displaced  Pulmonary/Chest: Effort normal and breath sounds normal  No respiratory distress  Abdominal: Soft  He exhibits no distension  There is CVA tenderness  There is no guarding  Musculoskeletal: Normal range of motion  Thoracic back: He exhibits pain  Back:    Lymphadenopathy:     He has no cervical adenopathy  Neurological: He is alert and oriented to person, place, and time  Skin: Skin is warm and dry  No rash noted  He is not diaphoretic  No pallor  Psychiatric: He has a normal mood and affect  Vitals reviewed        Vital Signs  ED Triage Vitals [03/27/20 0934]   Temperature Pulse Respirations Blood Pressure SpO2   97 5 °F (36 4 °C) 89 16 116/84 96 %      Temp Source Heart Rate Source Patient Position - Orthostatic VS BP Location FiO2 (%)   Oral Monitor Sitting Right arm --      Pain Score       Worst Possible Pain           Vitals:    03/27/20 0934 03/27/20 1145   BP: 116/84 100/53   Pulse: 89 72   Patient Position - Orthostatic VS: Sitting Lying         Visual Acuity      ED Medications  Medications   morphine injection 2 mg (2 mg Intravenous Given 3/27/20 1001)       Diagnostic Studies  Results Reviewed     Procedure Component Value Units Date/Time    Urine Microscopic [[id number]]  (Abnormal) Collected:  03/27/20 1001    Lab Status:  Final result Specimen:  Urine, Other Updated:  03/27/20 1056     RBC, UA 0-1 /hpf      WBC, UA 1-2 /hpf      Epithelial Cells Occasional /hpf      Bacteria, UA None Seen /hpf      OTHER OBSERVATIONS Sperm Present     MUCUS THREADS Innumerable    Comprehensive metabolic panel [[id number]]  (Abnormal) Collected:  03/27/20 1001    Lab Status:  Final result Specimen:  Blood from Arm, Left Updated:  03/27/20 1030     Sodium 133 mmol/L      Potassium 3 8 mmol/L      Chloride 99 mmol/L      CO2 28 mmol/L      ANION GAP 6 mmol/L      BUN 19 mg/dL      Creatinine 1 01 mg/dL      Glucose 115 mg/dL      Calcium 8 9 mg/dL      AST 18 U/L      ALT 21 U/L      Alkaline Phosphatase 57 U/L      Total Protein 7 3 g/dL      Albumin 4 0 g/dL      Total Bilirubin 1 60 mg/dL      eGFR 82 ml/min/1 73sq m     Narrative:       [place] guidelines for Chronic Kidney Disease (CKD):     Stage 1 with normal or high GFR (GFR > 90 mL/min/1 73 square meters)    Stage 2 Mild CKD (GFR = 60-89 mL/min/1 73 square meters)    Stage 3A Moderate CKD (GFR = 45-59 mL/min/1 73 square meters)    Stage 3B Moderate CKD (GFR = 30-44 mL/min/1 73 square meters)    Stage 4 Severe CKD (GFR = 15-29 mL/min/1 73 square meters)    Stage 5 End Stage CKD (GFR <15 mL/min/1 73 square meters)  Note: GFR calculation is accurate only with a steady state creatinine    CBC and differential [[id number]]  (Abnormal) Collected:  03/27/20 1001    Lab Status:  Final result Specimen:  Blood from Arm, Left Updated:  03/27/20 1020     WBC 6 83 Thousand/uL      RBC 6 06 Million/uL      Hemoglobin 11 4 g/dL      Hematocrit 36 6 %      MCV 60 fL      MCH 18 8 pg      MCHC 31 1 g/dL      RDW 18 9 %      MPV 9 9 fL      Platelets 347 Thousands/uL      nRBC 1 /100 WBCs      Neutrophils Relative 65 %      Immat GRANS % 1 %      Lymphocytes Relative 15 %      Monocytes Relative 17 %      Eosinophils Relative 1 %      Basophils Relative 1 %      Neutrophils Absolute 4 52 Thousands/µL      Immature Grans Absolute 0 04 Thousand/uL      Lymphocytes Absolute 1 03 Thousands/µL      Monocytes Absolute 1 16 Thousand/µL      Eosinophils Absolute 0 04 Thousand/µL      Basophils Absolute 0 04 Thousands/µL     UA w Reflex to Microscopic w Reflex to Culture [[id number]]  (Abnormal) Collected:  03/27/20 1001    Lab Status:  Final result Specimen:  Urine, Other Updated:  03/27/20 1020     Color, UA Yellow     Clarity, UA Clear     Specific Gravity, UA 1 025     pH, UA 6 0     Leukocytes, UA Negative     Nitrite, UA Negative     Protein, UA Negative mg/dl      Glucose, UA Negative mg/dl      Ketones, UA Negative mg/dl      Urobilinogen, UA 0 2 E U /dl      Bilirubin, UA Negative     Blood, UA Trace-Intact                 US right upper quadrant   Final Result by Destiny Franco MD (03/27 5712)      1  Two predominantly hyperechoic liver masses as described above  While nonspecific, these are most consistent with large hemangiomas containing central thrombus or fibrosis  This impression can be confirmed, and other diagnoses excluded, by means of    a nonemergent contrast-enhanced MRI of the liver  2   Status post cholecystectomy  3   Otherwise unremarkable right upper quadrant abdominal sonogram          Workstation performed: [id number]                    Procedures  Procedures         ED Course                                 MDM  Number of Diagnoses or Management Options  Right flank pain: new and requires workup  Diagnosis management comments: Right upper quadrant ultrasound was consistent with hepatic hemangioma  Likely not the source of the patient's pain  No hydronephrosis  Still no intrarenal calculi noted  Patient feels much better after medication administration  Laboratory studies are unremarkable  For now we will observe his symptoms and has been instructed to return with any worsening pain or any other concerning findings  Amount and/or Complexity of Data Reviewed  Clinical lab tests: reviewed and ordered  Tests in the radiology section of CPT®: reviewed and ordered  Discuss the patient with other providers: yes  Independent visualization of images, tracings, or specimens: yes    Patient Progress  Patient progress: stable        Disposition  Final diagnoses:   Right flank pain     Time reflects when diagnosis was documented in both MDM as applicable and the Disposition within this note     Time User Action Codes Description Comment    3/27/2020 11:57 AM Collette Familia Add [R10 9] Right flank pain       ED Disposition     ED Disposition Condition Date/Time Comment    Discharge Stable Fri Mar 27, 2020 59:00 AM Umberto Cohen discharge to home/self care  Follow-up Information     Follow up With Specialties Details Why Contact Info Additional [facility] Emergency Department Emergency Medicine  If your symptoms worsen, or you are not improving   [address] [id number] [phone] [place] ED, [address], [place], [facility]    David Chavez MD  Schedule an appointment as soon as possible for a visit  For Continued Evaluation [address] [facility] [id number] [place] [address] [address]  [phone]             Discharge Medication List as of 3/27/2020 11:59 AM      START taking these medications    Details   HYDROcodone-acetaminophen (NORCO) 5-325 mg per tablet Take 1 tablet by mouth every 6 (six) hours as needed for pain for up to 12 dosesMax Daily Amount: 4 tablets, Starting Fri 3/27/2020, Normal         CONTINUE these medications which have NOT CHANGED    Details   albuterol (2 5 mg/3 mL) 0 083 % nebulizer solution Take 2 5 mg by nebulization every 6 (six) hours as needed for wheezing or shortness of breath, Historical Med      EPINEPHrine (EPIPEN) 0 3 mg/0 3 mL SOAJ Inject 0 3 mL (0 3 mg total) into a muscle once for 1 dose, Starting Fri 10/11/2019, Print      ibuprofen (MOTRIN) 600 mg tablet Take by mouth every 6 (six) hours as needed for mild pain, Historical Med      metoprolol tartrate (LOPRESSOR) 25 mg tablet Take 25 mg by mouth every 12 (twelve) hours, Historical Med      nitroglycerin (NITROSTAT) 0 3 mg SL tablet Place 0 3 mg under the tongue every 5 (five) minutes as needed for chest pain, Historical Med      omeprazole (PriLOSEC) 20 mg delayed release capsule Take 1 capsule (20 mg total) by mouth daily, Starting Mon 7/15/2019, Normal      oxyCODONE-acetaminophen (PERCOCET) 5-325 mg per tablet Take 1 tablet by mouth every 8 (eight) hours as needed for moderate painMax Daily Amount: 3 tablets, Starting Mon 2/24/2020, Normal      predniSONE 50 mg tablet Take 1 tablet (50 mg total) by mouth daily, Starting Fri 10/11/2019, Print      raNITIdine HCl (ZANTAC 75 PO) Take by mouth, Historical Med      simvastatin (ZOCOR) 20 mg tablet Take 20 mg by mouth daily at bedtime, Historical Med      sucralfate (CARAFATE) 1 g tablet Take 1 g by mouth 4 (four) times a day, Historical Med           No discharge procedures on file      PDMP Review     None          ED Provider  Electronically Signed by           Gio Geller  03/27/20 5612

## 2020-03-27 NOTE — ED NOTES
US at bedside     Jose Alberto Flannery, [facility]  24/[birth year] 4887

## 2020-04-02 ENCOUNTER — APPOINTMENT (EMERGENCY)
Dept: CT IMAGING | Facility: HOSPITAL | Age: 60
End: 2020-04-02
Payer: COMMERCIAL

## 2020-04-02 ENCOUNTER — HOSPITAL ENCOUNTER (EMERGENCY)
Facility: HOSPITAL | Age: 60
Discharge: HOME | End: 2020-04-02
Attending: EMERGENCY MEDICINE | Admitting: EMERGENCY MEDICINE
Payer: COMMERCIAL

## 2020-04-02 VITALS
OXYGEN SATURATION: 95 % | WEIGHT: 191.8 LBS | DIASTOLIC BLOOD PRESSURE: 66 MMHG | HEART RATE: 87 BPM | BODY MASS INDEX: 30.04 KG/M2 | RESPIRATION RATE: 17 BRPM | SYSTOLIC BLOOD PRESSURE: 113 MMHG | TEMPERATURE: 98.6 F

## 2020-04-02 DIAGNOSIS — M54.50 RIGHT LOW BACK PAIN: Primary | ICD-10-CM

## 2020-04-02 DIAGNOSIS — R11.2 NAUSEA VOMITING AND DIARRHEA: ICD-10-CM

## 2020-04-02 DIAGNOSIS — R10.9 RECURRENT ABDOMINAL PAIN: ICD-10-CM

## 2020-04-02 DIAGNOSIS — R19.7 NAUSEA VOMITING AND DIARRHEA: ICD-10-CM

## 2020-04-02 DIAGNOSIS — D64.9 CHRONIC ANEMIA: ICD-10-CM

## 2020-04-02 LAB
ALBUMIN SERPL BCP-MCNC: 3.5 G/DL (ref 3.5–5)
ALP SERPL-CCNC: 49 U/L (ref 46–116)
ALT SERPL W P-5'-P-CCNC: 19 U/L (ref 12–78)
ANION GAP SERPL CALCULATED.3IONS-SCNC: 9 MMOL/L (ref 4–13)
AST SERPL W P-5'-P-CCNC: 30 U/L (ref 5–45)
BACTERIA UR QL AUTO: ABNORMAL /HPF
BASOPHILS # BLD AUTO: 0.01 THOUSANDS/ΜL (ref 0–0.1)
BASOPHILS NFR BLD AUTO: 0 % (ref 0–1)
BILIRUB DIRECT SERPL-MCNC: 0.29 MG/DL (ref 0–0.2)
BILIRUB SERPL-MCNC: 1 MG/DL (ref 0.2–1)
BILIRUB UR QL STRIP: NEGATIVE
BUN SERPL-MCNC: 14 MG/DL (ref 5–25)
CALCIUM SERPL-MCNC: 8.3 MG/DL (ref 8.3–10.1)
CHLORIDE SERPL-SCNC: 94 MMOL/L (ref 100–108)
CLARITY UR: CLEAR
CO2 SERPL-SCNC: 28 MMOL/L (ref 21–32)
COLOR UR: YELLOW
CREAT SERPL-MCNC: 1 MG/DL (ref 0.6–1.3)
EOSINOPHIL # BLD AUTO: 0 THOUSAND/ΜL (ref 0–0.61)
EOSINOPHIL NFR BLD AUTO: 0 % (ref 0–6)
ERYTHROCYTE [DISTWIDTH] IN BLOOD BY AUTOMATED COUNT: 17.2 % (ref 11.6–15.1)
GFR SERPL CREATININE-BSD FRML MDRD: 83 ML/MIN/1.73SQ M
GLUCOSE SERPL-MCNC: 113 MG/DL (ref 65–140)
GLUCOSE UR STRIP-MCNC: NEGATIVE MG/DL
HCT VFR BLD AUTO: 36.1 % (ref 36.5–49.3)
HGB BLD-MCNC: 11.4 G/DL (ref 12–17)
HGB UR QL STRIP.AUTO: NEGATIVE
IMM GRANULOCYTES # BLD AUTO: 0.02 THOUSAND/UL (ref 0–0.2)
IMM GRANULOCYTES NFR BLD AUTO: 0 % (ref 0–2)
KETONES UR STRIP-MCNC: ABNORMAL MG/DL
LEUKOCYTE ESTERASE UR QL STRIP: NEGATIVE
LIPASE SERPL-CCNC: 80 U/L (ref 73–393)
LYMPHOCYTES # BLD AUTO: 0.88 THOUSANDS/ΜL (ref 0.6–4.47)
LYMPHOCYTES NFR BLD AUTO: 17 % (ref 14–44)
MCH RBC QN AUTO: 18.6 PG (ref 26.8–34.3)
MCHC RBC AUTO-ENTMCNC: 31.6 G/DL (ref 31.4–37.4)
MCV RBC AUTO: 59 FL (ref 82–98)
MONOCYTES # BLD AUTO: 0.56 THOUSAND/ΜL (ref 0.17–1.22)
MONOCYTES NFR BLD AUTO: 11 % (ref 4–12)
NEUTROPHILS # BLD AUTO: 3.69 THOUSANDS/ΜL (ref 1.85–7.62)
NEUTS SEG NFR BLD AUTO: 72 % (ref 43–75)
NITRITE UR QL STRIP: NEGATIVE
NON-SQ EPI CELLS URNS QL MICRO: ABNORMAL /HPF
NRBC BLD AUTO-RTO: 0 /100 WBCS
PH UR STRIP.AUTO: 6 [PH]
PLATELET # BLD AUTO: 191 THOUSANDS/UL (ref 149–390)
PMV BLD AUTO: 10 FL (ref 8.9–12.7)
POTASSIUM SERPL-SCNC: 3.9 MMOL/L (ref 3.5–5.3)
PROT SERPL-MCNC: 7 G/DL (ref 6.4–8.2)
PROT UR STRIP-MCNC: ABNORMAL MG/DL
RBC # BLD AUTO: 6.14 MILLION/UL (ref 3.88–5.62)
RBC #/AREA URNS AUTO: ABNORMAL /HPF
SODIUM SERPL-SCNC: 131 MMOL/L (ref 136–145)
SP GR UR STRIP.AUTO: 1.02 (ref 1–1.03)
UROBILINOGEN UR QL STRIP.AUTO: 1 E.U./DL
WBC # BLD AUTO: 5.16 THOUSAND/UL (ref 4.31–10.16)
WBC #/AREA URNS AUTO: ABNORMAL /HPF

## 2020-04-02 PROCEDURE — 96374 THER/PROPH/DIAG INJ IV PUSH: CPT

## 2020-04-02 PROCEDURE — 99285 EMERGENCY DEPT VISIT HI MDM: CPT

## 2020-04-02 PROCEDURE — 83690 ASSAY OF LIPASE: CPT | Performed by: EMERGENCY MEDICINE

## 2020-04-02 PROCEDURE — 96361 HYDRATE IV INFUSION ADD-ON: CPT

## 2020-04-02 PROCEDURE — 80076 HEPATIC FUNCTION PANEL: CPT | Performed by: EMERGENCY MEDICINE

## 2020-04-02 PROCEDURE — 80048 BASIC METABOLIC PNL TOTAL CA: CPT | Performed by: EMERGENCY MEDICINE

## 2020-04-02 PROCEDURE — 99285 EMERGENCY DEPT VISIT HI MDM: CPT | Performed by: EMERGENCY MEDICINE

## 2020-04-02 PROCEDURE — 36415 COLL VENOUS BLD VENIPUNCTURE: CPT | Performed by: EMERGENCY MEDICINE

## 2020-04-02 PROCEDURE — 81001 URINALYSIS AUTO W/SCOPE: CPT | Performed by: EMERGENCY MEDICINE

## 2020-04-02 PROCEDURE — 74176 CT ABD & PELVIS W/O CONTRAST: CPT

## 2020-04-02 PROCEDURE — 85025 COMPLETE CBC W/AUTO DIFF WBC: CPT | Performed by: EMERGENCY MEDICINE

## 2020-04-02 RX ORDER — KETOROLAC TROMETHAMINE 30 MG/ML
15 INJECTION, SOLUTION INTRAMUSCULAR; INTRAVENOUS ONCE
Status: COMPLETED | OUTPATIENT
Start: 2020-04-02 | End: 2020-04-02

## 2020-04-02 RX ORDER — ONDANSETRON 4 MG/1
4 TABLET, ORALLY DISINTEGRATING ORAL EVERY 6 HOURS PRN
Qty: 20 TABLET | Refills: 0 | Status: SHIPPED | OUTPATIENT
Start: 2020-04-02 | End: 2021-12-18

## 2020-04-02 RX ORDER — ONDANSETRON 2 MG/ML
1 INJECTION INTRAMUSCULAR; INTRAVENOUS ONCE
Status: COMPLETED | OUTPATIENT
Start: 2020-04-02 | End: 2020-04-02

## 2020-04-02 RX ADMIN — SODIUM CHLORIDE 1000 ML: 0.9 INJECTION, SOLUTION INTRAVENOUS at 21:08

## 2020-04-02 RX ADMIN — KETOROLAC TROMETHAMINE 15 MG: 30 INJECTION, SOLUTION INTRAMUSCULAR at 21:09

## 2020-05-15 ENCOUNTER — TELEPHONE (OUTPATIENT)
Dept: OTHER | Facility: HOSPITAL | Age: 60
End: 2020-05-15

## 2020-05-15 ENCOUNTER — HOSPITAL ENCOUNTER (EMERGENCY)
Facility: HOSPITAL | Age: 60
Discharge: HOME | End: 2020-05-15
Attending: EMERGENCY MEDICINE | Admitting: EMERGENCY MEDICINE
Payer: COMMERCIAL

## 2020-05-15 ENCOUNTER — APPOINTMENT (EMERGENCY)
Dept: RADIOLOGY | Facility: HOSPITAL | Age: 60
End: 2020-05-15
Payer: COMMERCIAL

## 2020-05-15 VITALS
HEART RATE: 91 BPM | DIASTOLIC BLOOD PRESSURE: 60 MMHG | RESPIRATION RATE: 18 BRPM | OXYGEN SATURATION: 96 % | SYSTOLIC BLOOD PRESSURE: 91 MMHG | TEMPERATURE: 97.4 F

## 2020-05-15 DIAGNOSIS — R33.9 URINARY RETENTION: ICD-10-CM

## 2020-05-15 DIAGNOSIS — Z87.898 HISTORY OF CHEST PAIN: ICD-10-CM

## 2020-05-15 DIAGNOSIS — R10.9 ABDOMINAL PAIN: Primary | ICD-10-CM

## 2020-05-15 LAB
ALBUMIN SERPL BCP-MCNC: 3.6 G/DL (ref 3.5–5)
ALP SERPL-CCNC: 59 U/L (ref 46–116)
ALT SERPL W P-5'-P-CCNC: 20 U/L (ref 12–78)
ANION GAP SERPL CALCULATED.3IONS-SCNC: 7 MMOL/L (ref 4–13)
AST SERPL W P-5'-P-CCNC: 19 U/L (ref 5–45)
ATRIAL RATE: 79 BPM
BACTERIA UR QL AUTO: ABNORMAL /HPF
BASOPHILS # BLD AUTO: 0.03 THOUSANDS/ΜL (ref 0–0.1)
BASOPHILS NFR BLD AUTO: 0 % (ref 0–1)
BILIRUB SERPL-MCNC: 1.1 MG/DL (ref 0.2–1)
BILIRUB UR QL STRIP: NEGATIVE
BUN SERPL-MCNC: 14 MG/DL (ref 5–25)
CALCIUM SERPL-MCNC: 8.8 MG/DL (ref 8.3–10.1)
CHLORIDE SERPL-SCNC: 106 MMOL/L (ref 100–108)
CLARITY UR: CLEAR
CO2 SERPL-SCNC: 28 MMOL/L (ref 21–32)
COLOR UR: YELLOW
CREAT SERPL-MCNC: 1.02 MG/DL (ref 0.6–1.3)
EOSINOPHIL # BLD AUTO: 0.14 THOUSAND/ΜL (ref 0–0.61)
EOSINOPHIL NFR BLD AUTO: 2 % (ref 0–6)
ERYTHROCYTE [DISTWIDTH] IN BLOOD BY AUTOMATED COUNT: 21.1 % (ref 11.6–15.1)
GFR SERPL CREATININE-BSD FRML MDRD: 81 ML/MIN/1.73SQ M
GLUCOSE SERPL-MCNC: 106 MG/DL (ref 65–140)
GLUCOSE UR STRIP-MCNC: NEGATIVE MG/DL
HCT VFR BLD AUTO: 34.8 % (ref 36.5–49.3)
HGB BLD-MCNC: 10.6 G/DL (ref 12–17)
HGB UR QL STRIP.AUTO: ABNORMAL
IMM GRANULOCYTES # BLD AUTO: 0.03 THOUSAND/UL (ref 0–0.2)
IMM GRANULOCYTES NFR BLD AUTO: 0 % (ref 0–2)
KETONES UR STRIP-MCNC: NEGATIVE MG/DL
LEUKOCYTE ESTERASE UR QL STRIP: NEGATIVE
LYMPHOCYTES # BLD AUTO: 1.7 THOUSANDS/ΜL (ref 0.6–4.47)
LYMPHOCYTES NFR BLD AUTO: 19 % (ref 14–44)
MCH RBC QN AUTO: 18.5 PG (ref 26.8–34.3)
MCHC RBC AUTO-ENTMCNC: 30.5 G/DL (ref 31.4–37.4)
MCV RBC AUTO: 61 FL (ref 82–98)
MONOCYTES # BLD AUTO: 0.58 THOUSAND/ΜL (ref 0.17–1.22)
MONOCYTES NFR BLD AUTO: 6 % (ref 4–12)
MUCOUS THREADS UR QL AUTO: ABNORMAL
NEUTROPHILS # BLD AUTO: 6.57 THOUSANDS/ΜL (ref 1.85–7.62)
NEUTS SEG NFR BLD AUTO: 73 % (ref 43–75)
NITRITE UR QL STRIP: NEGATIVE
NON-SQ EPI CELLS URNS QL MICRO: ABNORMAL /HPF
NRBC BLD AUTO-RTO: 1 /100 WBCS
P AXIS: 50 DEGREES
PH UR STRIP.AUTO: 5.5 [PH]
PLATELET # BLD AUTO: 296 THOUSANDS/UL (ref 149–390)
PMV BLD AUTO: 9.8 FL (ref 8.9–12.7)
POTASSIUM SERPL-SCNC: 3.8 MMOL/L (ref 3.5–5.3)
PR INTERVAL: 168 MS
PROT SERPL-MCNC: 6.7 G/DL (ref 6.4–8.2)
PROT UR STRIP-MCNC: NEGATIVE MG/DL
QRS AXIS: 52 DEGREES
QRSD INTERVAL: 84 MS
QT INTERVAL: 380 MS
QTC INTERVAL: 435 MS
RBC # BLD AUTO: 5.74 MILLION/UL (ref 3.88–5.62)
RBC #/AREA URNS AUTO: ABNORMAL /HPF
SODIUM SERPL-SCNC: 141 MMOL/L (ref 136–145)
SP GR UR STRIP.AUTO: 1.02 (ref 1–1.03)
T WAVE AXIS: 50 DEGREES
TROPONIN I SERPL-MCNC: <0.02 NG/ML
UROBILINOGEN UR QL STRIP.AUTO: 0.2 E.U./DL
VENTRICULAR RATE: 79 BPM
WBC # BLD AUTO: 9.05 THOUSAND/UL (ref 4.31–10.16)
WBC #/AREA URNS AUTO: ABNORMAL /HPF

## 2020-05-15 PROCEDURE — 99284 EMERGENCY DEPT VISIT MOD MDM: CPT | Performed by: PHYSICIAN ASSISTANT

## 2020-05-15 PROCEDURE — 36415 COLL VENOUS BLD VENIPUNCTURE: CPT | Performed by: PHYSICIAN ASSISTANT

## 2020-05-15 PROCEDURE — 71045 X-RAY EXAM CHEST 1 VIEW: CPT

## 2020-05-15 PROCEDURE — 80053 COMPREHEN METABOLIC PANEL: CPT | Performed by: PHYSICIAN ASSISTANT

## 2020-05-15 PROCEDURE — 96360 HYDRATION IV INFUSION INIT: CPT

## 2020-05-15 PROCEDURE — 85025 COMPLETE CBC W/AUTO DIFF WBC: CPT | Performed by: PHYSICIAN ASSISTANT

## 2020-05-15 PROCEDURE — 99285 EMERGENCY DEPT VISIT HI MDM: CPT

## 2020-05-15 PROCEDURE — 93010 ELECTROCARDIOGRAM REPORT: CPT | Performed by: INTERNAL MEDICINE

## 2020-05-15 PROCEDURE — 81001 URINALYSIS AUTO W/SCOPE: CPT | Performed by: PHYSICIAN ASSISTANT

## 2020-05-15 PROCEDURE — 93005 ELECTROCARDIOGRAM TRACING: CPT

## 2020-05-15 PROCEDURE — 84484 ASSAY OF TROPONIN QUANT: CPT | Performed by: PHYSICIAN ASSISTANT

## 2020-05-15 RX ORDER — OXYCODONE HYDROCHLORIDE 5 MG/1
5 TABLET ORAL 2 TIMES DAILY PRN
Qty: 12 TABLET | Refills: 0 | Status: SHIPPED | OUTPATIENT
Start: 2020-05-15 | End: 2020-05-18

## 2020-05-15 RX ADMIN — SODIUM CHLORIDE 1000 ML: 0.9 INJECTION, SOLUTION INTRAVENOUS at 13:24

## 2021-12-18 ENCOUNTER — APPOINTMENT (EMERGENCY)
Dept: CT IMAGING | Facility: HOSPITAL | Age: 61
End: 2021-12-18
Payer: COMMERCIAL

## 2021-12-18 ENCOUNTER — HOSPITAL ENCOUNTER (OUTPATIENT)
Facility: HOSPITAL | Age: 61
Setting detail: OBSERVATION
Discharge: HOME | End: 2021-12-19
Attending: EMERGENCY MEDICINE | Admitting: FAMILY MEDICINE
Payer: COMMERCIAL

## 2021-12-18 DIAGNOSIS — K57.92 DIVERTICULITIS: Primary | ICD-10-CM

## 2021-12-18 DIAGNOSIS — K40.90 UNILATERAL INGUINAL HERNIA WITHOUT OBSTRUCTION OR GANGRENE, RECURRENCE NOT SPECIFIED: ICD-10-CM

## 2021-12-18 PROBLEM — K57.32 DIVERTICULITIS OF LARGE INTESTINE WITHOUT PERFORATION OR ABSCESS WITHOUT BLEEDING: Status: ACTIVE | Noted: 2021-12-18

## 2021-12-18 PROBLEM — I10 PRIMARY HYPERTENSION: Status: ACTIVE | Noted: 2021-12-18

## 2021-12-18 PROBLEM — J43.9 PULMONARY EMPHYSEMA (HCC): Status: ACTIVE | Noted: 2021-12-18

## 2021-12-18 LAB
ALBUMIN SERPL BCP-MCNC: 3.8 G/DL (ref 3.5–5)
ALP SERPL-CCNC: 68 U/L (ref 46–116)
ALT SERPL W P-5'-P-CCNC: 18 U/L (ref 12–78)
ANION GAP SERPL CALCULATED.3IONS-SCNC: 5 MMOL/L (ref 4–13)
AST SERPL W P-5'-P-CCNC: 15 U/L (ref 5–45)
BACTERIA UR QL AUTO: ABNORMAL /HPF
BASOPHILS # BLD AUTO: 0.05 THOUSANDS/ΜL (ref 0–0.1)
BASOPHILS NFR BLD AUTO: 1 % (ref 0–1)
BILIRUB DIRECT SERPL-MCNC: 0.22 MG/DL (ref 0–0.2)
BILIRUB SERPL-MCNC: 1.04 MG/DL (ref 0.2–1)
BILIRUB UR QL STRIP: NEGATIVE
BUN SERPL-MCNC: 11 MG/DL (ref 5–25)
CALCIUM SERPL-MCNC: 8.8 MG/DL (ref 8.3–10.1)
CHLORIDE SERPL-SCNC: 105 MMOL/L (ref 100–108)
CLARITY UR: CLEAR
CO2 SERPL-SCNC: 30 MMOL/L (ref 21–32)
COLOR UR: YELLOW
CREAT SERPL-MCNC: 0.95 MG/DL (ref 0.6–1.3)
EOSINOPHIL # BLD AUTO: 0.28 THOUSAND/ΜL (ref 0–0.61)
EOSINOPHIL NFR BLD AUTO: 3 % (ref 0–6)
ERYTHROCYTE [DISTWIDTH] IN BLOOD BY AUTOMATED COUNT: 19.1 % (ref 11.6–15.1)
GFR SERPL CREATININE-BSD FRML MDRD: 86 ML/MIN/1.73SQ M
GLUCOSE SERPL-MCNC: 114 MG/DL (ref 65–140)
GLUCOSE UR STRIP-MCNC: NEGATIVE MG/DL
HCT VFR BLD AUTO: 39.4 % (ref 36.5–49.3)
HGB BLD-MCNC: 11.9 G/DL (ref 12–17)
HGB UR QL STRIP.AUTO: ABNORMAL
IMM GRANULOCYTES # BLD AUTO: 0.04 THOUSAND/UL (ref 0–0.2)
IMM GRANULOCYTES NFR BLD AUTO: 0 % (ref 0–2)
INR PPP: 1.05 (ref 0.84–1.19)
KETONES UR STRIP-MCNC: NEGATIVE MG/DL
LACTATE SERPL-SCNC: 0.9 MMOL/L (ref 0.5–2)
LEUKOCYTE ESTERASE UR QL STRIP: NEGATIVE
LIPASE SERPL-CCNC: 36 U/L (ref 73–393)
LYMPHOCYTES # BLD AUTO: 1.34 THOUSANDS/ΜL (ref 0.6–4.47)
LYMPHOCYTES NFR BLD AUTO: 12 % (ref 14–44)
MCH RBC QN AUTO: 18.5 PG (ref 26.8–34.3)
MCHC RBC AUTO-ENTMCNC: 30.2 G/DL (ref 31.4–37.4)
MCV RBC AUTO: 61 FL (ref 82–98)
MONOCYTES # BLD AUTO: 0.66 THOUSAND/ΜL (ref 0.17–1.22)
MONOCYTES NFR BLD AUTO: 6 % (ref 4–12)
MUCOUS THREADS UR QL AUTO: ABNORMAL
NEUTROPHILS # BLD AUTO: 8.46 THOUSANDS/ΜL (ref 1.85–7.62)
NEUTS SEG NFR BLD AUTO: 78 % (ref 43–75)
NITRITE UR QL STRIP: NEGATIVE
NON-SQ EPI CELLS URNS QL MICRO: ABNORMAL /HPF
NRBC BLD AUTO-RTO: 0 /100 WBCS
PH UR STRIP.AUTO: 5 [PH]
PLATELET # BLD AUTO: 313 THOUSANDS/UL (ref 149–390)
PMV BLD AUTO: 9.5 FL (ref 8.9–12.7)
POTASSIUM SERPL-SCNC: 4.4 MMOL/L (ref 3.5–5.3)
PROT SERPL-MCNC: 6.8 G/DL (ref 6.4–8.2)
PROT UR STRIP-MCNC: NEGATIVE MG/DL
PROTHROMBIN TIME: 13.3 SECONDS (ref 11.6–14.5)
RBC # BLD AUTO: 6.43 MILLION/UL (ref 3.88–5.62)
RBC #/AREA URNS AUTO: ABNORMAL /HPF
SODIUM SERPL-SCNC: 140 MMOL/L (ref 136–145)
SP GR UR STRIP.AUTO: >=1.03 (ref 1–1.03)
UROBILINOGEN UR QL STRIP.AUTO: 0.2 E.U./DL
WBC # BLD AUTO: 10.83 THOUSAND/UL (ref 4.31–10.16)
WBC #/AREA URNS AUTO: ABNORMAL /HPF

## 2021-12-18 PROCEDURE — 80076 HEPATIC FUNCTION PANEL: CPT | Performed by: EMERGENCY MEDICINE

## 2021-12-18 PROCEDURE — 80048 BASIC METABOLIC PNL TOTAL CA: CPT | Performed by: EMERGENCY MEDICINE

## 2021-12-18 PROCEDURE — 83690 ASSAY OF LIPASE: CPT | Performed by: EMERGENCY MEDICINE

## 2021-12-18 PROCEDURE — 85610 PROTHROMBIN TIME: CPT | Performed by: EMERGENCY MEDICINE

## 2021-12-18 PROCEDURE — 99285 EMERGENCY DEPT VISIT HI MDM: CPT

## 2021-12-18 PROCEDURE — 81001 URINALYSIS AUTO W/SCOPE: CPT | Performed by: EMERGENCY MEDICINE

## 2021-12-18 PROCEDURE — 74176 CT ABD & PELVIS W/O CONTRAST: CPT

## 2021-12-18 PROCEDURE — 85025 COMPLETE CBC W/AUTO DIFF WBC: CPT | Performed by: EMERGENCY MEDICINE

## 2021-12-18 PROCEDURE — 99285 EMERGENCY DEPT VISIT HI MDM: CPT | Performed by: EMERGENCY MEDICINE

## 2021-12-18 PROCEDURE — 99220 PR INITIAL OBSERVATION CARE/DAY 70 MINUTES: CPT | Performed by: FAMILY MEDICINE

## 2021-12-18 PROCEDURE — G1004 CDSM NDSC: HCPCS

## 2021-12-18 PROCEDURE — 36415 COLL VENOUS BLD VENIPUNCTURE: CPT | Performed by: EMERGENCY MEDICINE

## 2021-12-18 PROCEDURE — 83605 ASSAY OF LACTIC ACID: CPT | Performed by: EMERGENCY MEDICINE

## 2021-12-18 RX ORDER — LISINOPRIL 10 MG/1
10 TABLET ORAL DAILY
COMMUNITY
End: 2022-05-16

## 2021-12-18 RX ORDER — CIPROFLOXACIN 2 MG/ML
400 INJECTION, SOLUTION INTRAVENOUS EVERY 12 HOURS
Status: DISCONTINUED | OUTPATIENT
Start: 2021-12-18 | End: 2021-12-19 | Stop reason: HOSPADM

## 2021-12-18 RX ORDER — KETOROLAC TROMETHAMINE 30 MG/ML
15 INJECTION, SOLUTION INTRAMUSCULAR; INTRAVENOUS ONCE
Status: COMPLETED | OUTPATIENT
Start: 2021-12-18 | End: 2021-12-18

## 2021-12-18 RX ORDER — METRONIDAZOLE 500 MG/1
500 TABLET ORAL EVERY 8 HOURS SCHEDULED
Status: DISCONTINUED | OUTPATIENT
Start: 2021-12-18 | End: 2021-12-19 | Stop reason: HOSPADM

## 2021-12-18 RX ORDER — ALBUTEROL SULFATE 2.5 MG/3ML
2.5 SOLUTION RESPIRATORY (INHALATION) EVERY 6 HOURS PRN
Status: DISCONTINUED | OUTPATIENT
Start: 2021-12-18 | End: 2021-12-19 | Stop reason: HOSPADM

## 2021-12-18 RX ORDER — SODIUM CHLORIDE 9 MG/ML
75 INJECTION, SOLUTION INTRAVENOUS CONTINUOUS
Status: DISCONTINUED | OUTPATIENT
Start: 2021-12-18 | End: 2021-12-19 | Stop reason: HOSPADM

## 2021-12-18 RX ORDER — OXYCODONE HYDROCHLORIDE 5 MG/1
5 TABLET ORAL EVERY 6 HOURS PRN
Status: DISCONTINUED | OUTPATIENT
Start: 2021-12-18 | End: 2021-12-19 | Stop reason: HOSPADM

## 2021-12-18 RX ORDER — HEPARIN SODIUM 5000 [USP'U]/ML
5000 INJECTION, SOLUTION INTRAVENOUS; SUBCUTANEOUS EVERY 8 HOURS SCHEDULED
Status: DISCONTINUED | OUTPATIENT
Start: 2021-12-18 | End: 2021-12-19 | Stop reason: HOSPADM

## 2021-12-18 RX ORDER — ONDANSETRON 2 MG/ML
4 INJECTION INTRAMUSCULAR; INTRAVENOUS EVERY 6 HOURS PRN
Status: DISCONTINUED | OUTPATIENT
Start: 2021-12-18 | End: 2021-12-19 | Stop reason: HOSPADM

## 2021-12-18 RX ADMIN — CIPROFLOXACIN 400 MG: 2 INJECTION, SOLUTION INTRAVENOUS at 20:09

## 2021-12-18 RX ADMIN — METOPROLOL TARTRATE 25 MG: 25 TABLET, FILM COATED ORAL at 21:03

## 2021-12-18 RX ADMIN — HEPARIN SODIUM 5000 UNITS: 5000 INJECTION INTRAVENOUS; SUBCUTANEOUS at 21:07

## 2021-12-18 RX ADMIN — CEFEPIME HYDROCHLORIDE 2000 MG: 2 INJECTION, POWDER, FOR SOLUTION INTRAVENOUS at 21:38

## 2021-12-18 RX ADMIN — METRONIDAZOLE 500 MG: 500 TABLET ORAL at 21:03

## 2021-12-18 RX ADMIN — KETOROLAC TROMETHAMINE 15 MG: 30 INJECTION, SOLUTION INTRAMUSCULAR at 14:55

## 2021-12-18 RX ADMIN — OXYCODONE HYDROCHLORIDE 5 MG: 5 TABLET ORAL at 21:03

## 2021-12-18 RX ADMIN — SODIUM CHLORIDE 75 ML/HR: 0.9 INJECTION, SOLUTION INTRAVENOUS at 20:08

## 2021-12-19 VITALS
BODY MASS INDEX: 26.68 KG/M2 | OXYGEN SATURATION: 93 % | HEART RATE: 66 BPM | RESPIRATION RATE: 20 BRPM | DIASTOLIC BLOOD PRESSURE: 67 MMHG | WEIGHT: 170 LBS | TEMPERATURE: 98.2 F | HEIGHT: 67 IN | SYSTOLIC BLOOD PRESSURE: 104 MMHG

## 2021-12-19 PROBLEM — R93.89 ABNORMAL CT SCAN: Status: ACTIVE | Noted: 2021-12-19

## 2021-12-19 LAB
ANION GAP SERPL CALCULATED.3IONS-SCNC: 5 MMOL/L (ref 4–13)
BUN SERPL-MCNC: 14 MG/DL (ref 5–25)
CALCIUM SERPL-MCNC: 8.4 MG/DL (ref 8.3–10.1)
CHLORIDE SERPL-SCNC: 104 MMOL/L (ref 100–108)
CO2 SERPL-SCNC: 30 MMOL/L (ref 21–32)
CREAT SERPL-MCNC: 0.97 MG/DL (ref 0.6–1.3)
ERYTHROCYTE [DISTWIDTH] IN BLOOD BY AUTOMATED COUNT: 18.6 % (ref 11.6–15.1)
GFR SERPL CREATININE-BSD FRML MDRD: 84 ML/MIN/1.73SQ M
GLUCOSE P FAST SERPL-MCNC: 104 MG/DL (ref 65–99)
GLUCOSE SERPL-MCNC: 104 MG/DL (ref 65–140)
HCT VFR BLD AUTO: 35.3 % (ref 36.5–49.3)
HGB BLD-MCNC: 10.9 G/DL (ref 12–17)
MCH RBC QN AUTO: 19 PG (ref 26.8–34.3)
MCHC RBC AUTO-ENTMCNC: 30.9 G/DL (ref 31.4–37.4)
MCV RBC AUTO: 61 FL (ref 82–98)
PLATELET # BLD AUTO: 261 THOUSANDS/UL (ref 149–390)
PMV BLD AUTO: 9.3 FL (ref 8.9–12.7)
POTASSIUM SERPL-SCNC: 4.5 MMOL/L (ref 3.5–5.3)
RBC # BLD AUTO: 5.75 MILLION/UL (ref 3.88–5.62)
SODIUM SERPL-SCNC: 139 MMOL/L (ref 136–145)
WBC # BLD AUTO: 6.85 THOUSAND/UL (ref 4.31–10.16)

## 2021-12-19 PROCEDURE — 85027 COMPLETE CBC AUTOMATED: CPT | Performed by: FAMILY MEDICINE

## 2021-12-19 PROCEDURE — 80048 BASIC METABOLIC PNL TOTAL CA: CPT | Performed by: FAMILY MEDICINE

## 2021-12-19 PROCEDURE — 99217 PR OBSERVATION CARE DISCHARGE MANAGEMENT: CPT | Performed by: INTERNAL MEDICINE

## 2021-12-19 RX ORDER — CIPROFLOXACIN 500 MG/1
500 TABLET, FILM COATED ORAL EVERY 12 HOURS SCHEDULED
Qty: 20 TABLET | Refills: 0 | Status: SHIPPED | OUTPATIENT
Start: 2021-12-19 | End: 2021-12-29

## 2021-12-19 RX ORDER — METRONIDAZOLE 500 MG/1
500 TABLET ORAL EVERY 8 HOURS SCHEDULED
Qty: 30 TABLET | Refills: 0 | Status: SHIPPED | OUTPATIENT
Start: 2021-12-19 | End: 2021-12-29

## 2021-12-19 RX ORDER — OXYCODONE AND ACETAMINOPHEN 2.5; 325 MG/1; MG/1
1 TABLET ORAL EVERY 4 HOURS PRN
Qty: 30 TABLET | Refills: 0 | Status: SHIPPED | OUTPATIENT
Start: 2021-12-19 | End: 2021-12-29

## 2021-12-19 RX ADMIN — METRONIDAZOLE 500 MG: 500 TABLET ORAL at 05:00

## 2021-12-19 RX ADMIN — HEPARIN SODIUM 5000 UNITS: 5000 INJECTION INTRAVENOUS; SUBCUTANEOUS at 05:01

## 2021-12-19 RX ADMIN — CIPROFLOXACIN 400 MG: 2 INJECTION, SOLUTION INTRAVENOUS at 08:46

## 2022-02-26 ENCOUNTER — TELEPHONE (OUTPATIENT)
Dept: SURGERY | Facility: HOSPITAL | Age: 62
End: 2022-02-26

## 2022-02-26 ENCOUNTER — HOSPITAL ENCOUNTER (EMERGENCY)
Facility: HOSPITAL | Age: 62
Discharge: HOME | End: 2022-02-26
Attending: EMERGENCY MEDICINE | Admitting: EMERGENCY MEDICINE
Payer: COMMERCIAL

## 2022-02-26 ENCOUNTER — APPOINTMENT (EMERGENCY)
Dept: CT IMAGING | Facility: HOSPITAL | Age: 62
End: 2022-02-26
Payer: COMMERCIAL

## 2022-02-26 ENCOUNTER — APPOINTMENT (EMERGENCY)
Dept: PERIOP | Facility: HOSPITAL | Age: 62
End: 2022-02-26
Attending: INTERNAL MEDICINE
Payer: COMMERCIAL

## 2022-02-26 ENCOUNTER — ANESTHESIA EVENT (EMERGENCY)
Dept: PERIOP | Facility: HOSPITAL | Age: 62
End: 2022-02-26
Payer: COMMERCIAL

## 2022-02-26 ENCOUNTER — ANESTHESIA (EMERGENCY)
Dept: PERIOP | Facility: HOSPITAL | Age: 62
End: 2022-02-26
Payer: COMMERCIAL

## 2022-02-26 VITALS
BODY MASS INDEX: 26.93 KG/M2 | OXYGEN SATURATION: 99 % | RESPIRATION RATE: 22 BRPM | HEART RATE: 86 BPM | SYSTOLIC BLOOD PRESSURE: 127 MMHG | DIASTOLIC BLOOD PRESSURE: 75 MMHG | WEIGHT: 171.96 LBS | TEMPERATURE: 98.5 F

## 2022-02-26 DIAGNOSIS — T18.108A FOREIGN BODY IN ESOPHAGUS, INITIAL ENCOUNTER: Primary | ICD-10-CM

## 2022-02-26 PROBLEM — C22.9 LIVER CANCER (HCC): Status: ACTIVE | Noted: 2022-02-26

## 2022-02-26 LAB
ANION GAP SERPL CALCULATED.3IONS-SCNC: 6 MMOL/L (ref 4–13)
BASOPHILS # BLD AUTO: 0.03 THOUSANDS/ΜL (ref 0–0.1)
BASOPHILS NFR BLD AUTO: 0 % (ref 0–1)
BUN SERPL-MCNC: 20 MG/DL (ref 5–25)
CALCIUM SERPL-MCNC: 8.8 MG/DL (ref 8.3–10.1)
CHLORIDE SERPL-SCNC: 103 MMOL/L (ref 100–108)
CO2 SERPL-SCNC: 30 MMOL/L (ref 21–32)
CREAT SERPL-MCNC: 0.86 MG/DL (ref 0.6–1.3)
EOSINOPHIL # BLD AUTO: 0.12 THOUSAND/ΜL (ref 0–0.61)
EOSINOPHIL NFR BLD AUTO: 1 % (ref 0–6)
ERYTHROCYTE [DISTWIDTH] IN BLOOD BY AUTOMATED COUNT: 18.8 % (ref 11.6–15.1)
GFR SERPL CREATININE-BSD FRML MDRD: 94 ML/MIN/1.73SQ M
GLUCOSE SERPL-MCNC: 118 MG/DL (ref 65–140)
HCT VFR BLD AUTO: 36.3 % (ref 36.5–49.3)
HGB BLD-MCNC: 11.3 G/DL (ref 12–17)
IMM GRANULOCYTES # BLD AUTO: 0.05 THOUSAND/UL (ref 0–0.2)
IMM GRANULOCYTES NFR BLD AUTO: 0 % (ref 0–2)
LYMPHOCYTES # BLD AUTO: 1.35 THOUSANDS/ΜL (ref 0.6–4.47)
LYMPHOCYTES NFR BLD AUTO: 10 % (ref 14–44)
MCH RBC QN AUTO: 18.5 PG (ref 26.8–34.3)
MCHC RBC AUTO-ENTMCNC: 31.1 G/DL (ref 31.4–37.4)
MCV RBC AUTO: 59 FL (ref 82–98)
MONOCYTES # BLD AUTO: 0.82 THOUSAND/ΜL (ref 0.17–1.22)
MONOCYTES NFR BLD AUTO: 6 % (ref 4–12)
NEUTROPHILS # BLD AUTO: 11.88 THOUSANDS/ΜL (ref 1.85–7.62)
NEUTS SEG NFR BLD AUTO: 83 % (ref 43–75)
NRBC BLD AUTO-RTO: 0 /100 WBCS
PLATELET # BLD AUTO: 311 THOUSANDS/UL (ref 149–390)
PMV BLD AUTO: 9.1 FL (ref 8.9–12.7)
POTASSIUM SERPL-SCNC: 3.9 MMOL/L (ref 3.5–5.3)
RBC # BLD AUTO: 6.12 MILLION/UL (ref 3.88–5.62)
SODIUM SERPL-SCNC: 139 MMOL/L (ref 136–145)
WBC # BLD AUTO: 14.25 THOUSAND/UL (ref 4.31–10.16)

## 2022-02-26 PROCEDURE — 36415 COLL VENOUS BLD VENIPUNCTURE: CPT | Performed by: EMERGENCY MEDICINE

## 2022-02-26 PROCEDURE — 74176 CT ABD & PELVIS W/O CONTRAST: CPT

## 2022-02-26 PROCEDURE — 85025 COMPLETE CBC W/AUTO DIFF WBC: CPT | Performed by: EMERGENCY MEDICINE

## 2022-02-26 PROCEDURE — 71250 CT THORAX DX C-: CPT

## 2022-02-26 PROCEDURE — 96361 HYDRATE IV INFUSION ADD-ON: CPT

## 2022-02-26 PROCEDURE — 43248 EGD GUIDE WIRE INSERTION: CPT | Performed by: INTERNAL MEDICINE

## 2022-02-26 PROCEDURE — 96374 THER/PROPH/DIAG INJ IV PUSH: CPT

## 2022-02-26 PROCEDURE — 80048 BASIC METABOLIC PNL TOTAL CA: CPT | Performed by: EMERGENCY MEDICINE

## 2022-02-26 PROCEDURE — 96376 TX/PRO/DX INJ SAME DRUG ADON: CPT

## 2022-02-26 PROCEDURE — 99284 EMERGENCY DEPT VISIT MOD MDM: CPT | Performed by: INTERNAL MEDICINE

## 2022-02-26 PROCEDURE — 99285 EMERGENCY DEPT VISIT HI MDM: CPT

## 2022-02-26 PROCEDURE — 99285 EMERGENCY DEPT VISIT HI MDM: CPT | Performed by: EMERGENCY MEDICINE

## 2022-02-26 PROCEDURE — 96375 TX/PRO/DX INJ NEW DRUG ADDON: CPT

## 2022-02-26 PROCEDURE — G1004 CDSM NDSC: HCPCS

## 2022-02-26 RX ORDER — ONDANSETRON 2 MG/ML
1 INJECTION INTRAMUSCULAR; INTRAVENOUS ONCE
Status: COMPLETED | OUTPATIENT
Start: 2022-02-26 | End: 2022-02-26

## 2022-02-26 RX ORDER — LIDOCAINE HYDROCHLORIDE 20 MG/ML
INJECTION, SOLUTION EPIDURAL; INFILTRATION; INTRACAUDAL; PERINEURAL AS NEEDED
Status: DISCONTINUED | OUTPATIENT
Start: 2022-02-26 | End: 2022-02-26

## 2022-02-26 RX ORDER — ONDANSETRON 2 MG/ML
4 INJECTION INTRAMUSCULAR; INTRAVENOUS ONCE
Status: COMPLETED | OUTPATIENT
Start: 2022-02-26 | End: 2022-02-26

## 2022-02-26 RX ORDER — SUCCINYLCHOLINE/SOD CL,ISO/PF 100 MG/5ML
SYRINGE (ML) INTRAVENOUS AS NEEDED
Status: DISCONTINUED | OUTPATIENT
Start: 2022-02-26 | End: 2022-02-26

## 2022-02-26 RX ORDER — FENTANYL CITRATE 50 UG/ML
50 INJECTION, SOLUTION INTRAMUSCULAR; INTRAVENOUS ONCE
Status: COMPLETED | OUTPATIENT
Start: 2022-02-26 | End: 2022-02-26

## 2022-02-26 RX ORDER — SODIUM CHLORIDE, SODIUM LACTATE, POTASSIUM CHLORIDE, CALCIUM CHLORIDE 600; 310; 30; 20 MG/100ML; MG/100ML; MG/100ML; MG/100ML
INJECTION, SOLUTION INTRAVENOUS CONTINUOUS PRN
Status: DISCONTINUED | OUTPATIENT
Start: 2022-02-26 | End: 2022-02-26

## 2022-02-26 RX ORDER — LORAZEPAM 2 MG/ML
1 INJECTION INTRAMUSCULAR ONCE
Status: DISCONTINUED | OUTPATIENT
Start: 2022-02-26 | End: 2022-02-26 | Stop reason: HOSPADM

## 2022-02-26 RX ORDER — ONDANSETRON 2 MG/ML
4 INJECTION INTRAMUSCULAR; INTRAVENOUS ONCE AS NEEDED
Status: DISCONTINUED | OUTPATIENT
Start: 2022-02-26 | End: 2022-02-26 | Stop reason: HOSPADM

## 2022-02-26 RX ORDER — NITROGLYCERIN 0.4 MG/1
0.4 TABLET SUBLINGUAL ONCE
Status: COMPLETED | OUTPATIENT
Start: 2022-02-26 | End: 2022-02-26

## 2022-02-26 RX ORDER — SODIUM CHLORIDE, SODIUM LACTATE, POTASSIUM CHLORIDE, CALCIUM CHLORIDE 600; 310; 30; 20 MG/100ML; MG/100ML; MG/100ML; MG/100ML
100 INJECTION, SOLUTION INTRAVENOUS CONTINUOUS
Status: DISCONTINUED | OUTPATIENT
Start: 2022-02-26 | End: 2022-02-26 | Stop reason: HOSPADM

## 2022-02-26 RX ORDER — PROPOFOL 10 MG/ML
INJECTION, EMULSION INTRAVENOUS AS NEEDED
Status: DISCONTINUED | OUTPATIENT
Start: 2022-02-26 | End: 2022-02-26

## 2022-02-26 RX ADMIN — SODIUM CHLORIDE 1000 ML: 0.9 INJECTION, SOLUTION INTRAVENOUS at 04:39

## 2022-02-26 RX ADMIN — FENTANYL CITRATE 50 MCG: 50 INJECTION, SOLUTION INTRAMUSCULAR; INTRAVENOUS at 04:29

## 2022-02-26 RX ADMIN — LIDOCAINE HYDROCHLORIDE 100 MG: 20 INJECTION, SOLUTION EPIDURAL; INFILTRATION; INTRACAUDAL; PERINEURAL at 12:42

## 2022-02-26 RX ADMIN — Medication 100 MG: at 12:42

## 2022-02-26 RX ADMIN — PROPOFOL 100 MG: 10 INJECTION, EMULSION INTRAVENOUS at 12:42

## 2022-02-26 RX ADMIN — ONDANSETRON 4 MG: 2 INJECTION INTRAMUSCULAR; INTRAVENOUS at 09:26

## 2022-02-26 RX ADMIN — GLUCAGON HYDROCHLORIDE 1 MG: KIT at 04:29

## 2022-02-26 RX ADMIN — NITROGLYCERIN 0.4 MG: 0.4 TABLET SUBLINGUAL at 04:29

## 2022-02-26 RX ADMIN — FENTANYL CITRATE 50 MCG: 50 INJECTION, SOLUTION INTRAMUSCULAR; INTRAVENOUS at 08:21

## 2022-02-26 RX ADMIN — SODIUM CHLORIDE, SODIUM LACTATE, POTASSIUM CHLORIDE, AND CALCIUM CHLORIDE: .6; .31; .03; .02 INJECTION, SOLUTION INTRAVENOUS at 12:34

## 2022-02-26 NOTE — ED NOTES
Pt will be going for an EGD about noon today  Pt resting comfortably at this time, audible snoring heard, showing no signs of distress        Mendoza Wilson RN  02/26/22 8055

## 2022-02-26 NOTE — ANESTHESIA POSTPROCEDURE EVALUATION
Post-Op Assessment Note    CV Status:  Stable    Pain management: adequate     Mental Status:  Sleepy   Hydration Status:  Stable   PONV Controlled:  None   Airway Patency:  Patent  Airway: intubated       Staff: CRNA         No complications documented      BP   147/64   Temp      Pulse  80   Resp   16   SpO2   100

## 2022-02-26 NOTE — ANESTHESIA PREPROCEDURE EVALUATION
Procedure:  EGD    Relevant Problems   ANESTHESIA (within normal limits)      CARDIO   (+) Primary hypertension      GI/HEPATIC   (+) Liver cancer ([address] )      PULMONARY   (+) Pulmonary emphysema (HCC)        Physical Exam    Airway    Mallampati score: I  TM Distance: >3 FB  Neck ROM: full     Dental   No notable dental hx     Cardiovascular  Rhythm: regular, Rate: normal,     Pulmonary  Breath sounds clear to auscultation,     Other Findings        Anesthesia Plan  ASA Score- 3 Emergent    Anesthesia Type- general with ASA Monitors  Additional Monitors:   Airway Plan: ETT  Plan Factors-    Chart reviewed  EKG reviewed  Existing labs reviewed  Patient summary reviewed  Patient is not a current smoker  Induction- intravenous  Postoperative Plan-   Planned trial extubation    Informed Consent- Anesthetic plan and risks discussed with patient  I personally reviewed this patient with the CRNA  Discussed and agreed on the Anesthesia Plan with the CRNA  Roxana Ball

## 2022-02-26 NOTE — CONSULTS
Consultation - [facility]  Jose L Flowers 62 y o  male MRN: [id number]  Unit/Bed#: ED 14 Encounter: [id number]      Inpatient consult to gastroenterology  Consult performed by: Cookie Marcum PA-C  Consult ordered by: Selina Ga MD          Reason for Consult / Principal Problem: Food Impaction    HPI: Jose L Flowers is a 62y o  year old male who presents with a past medical history significant for Schatzki's ring, diverticulitis, hypertension, emphysema, GERD, history of MI, hyperlipidemia, and a history of [address]  treated many years ago with radiation therapy  Patient presents to the [facility] ER this am with a chief complaint of steak stuck in his mid esophagus  Patient reports he ate steak last evening at 5:00 a m  And presented to the emergency room at 4:00 a m  Octavio Cunningham Patient is reporting chest pain and retching for hours  He is bringing up mucus put can not swallow water  He reports that this has happened in the past   Patient's last endoscopic evaluation that I have is from 2019  Patient presented to the emergency department on July 3, 2019 with a chief complaint of a food impaction at that time as well  Patient had a meat bolus status post removal from the esophagus with evidence of grade C reflux esophagitis and a hiatal hernia  Patient then had a repeat upper endoscopy on July 15, 2019 that showed evidence of a small nonobstructing Schatzki's ring and a small hiatal hernia  Patient reports he has been suffering with a multitude of GI symptoms over the last several months to year  He was actually following with a physician in [place]  Patient reports that he has been having evidence of bloody diarrhea  Patient reports a 70 lb weight loss over the last year  Patient reports that he had a history of hepatocellular carcinoma many years ago treated in [place]  Patient reports he had 2 rounds of radiation    He reports that after his 2nd round of radiation he never followed up     REVIEW OF SYSTEMS:     CONSTITUTIONAL: Denies any fever, chills, or rigors  Good appetite, and no recent weight loss  HEENT: No earache or tinnitus  Denies hearing loss or visual disturbances  CARDIOVASCULAR: No chest pain or palpitations  RESPIRATORY: Denies any cough, hemoptysis, shortness of breath or dyspnea on exertion  GASTROINTESTINAL: As noted in the History of Present Illness  GENITOURINARY: No problems with urination  Denies any hematuria or dysuria  NEUROLOGIC: No dizziness or vertigo, denies headaches  MUSCULOSKELETAL: Denies any muscle or joint pain  SKIN: Denies skin rashes or itching  ENDOCRINE: Denies excessive thirst  Denies intolerance to heat or cold  PSYCHOSOCIAL: Denies depression or anxiety  Denies any recent memory loss  Historical Information   Past Medical History:   Diagnosis Date    Angina pectoris ([facility] )     Asthma     Cancer ([facility] )     liver    Colon polyp     Coronary artery disease     Heart attack ([place] [address] )     Hyperlipidemia     Hypertension     Liver disease     Shortness of breath      Past Surgical History:   Procedure Laterality Date    BACK SURGERY      CHOLECYSTECTOMY      COLONOSCOPY      CORONARY ANGIOPLASTY WITH STENT PLACEMENT      HERNIA REPAIR      KNEE ARTHROSCOPY      TONSILLECTOMY       Social History   Social History     Substance and Sexual Activity   Alcohol Use Never     Social History     Substance and Sexual Activity   Drug Use Never     Social History     Tobacco Use   Smoking Status Never Smoker   Smokeless Tobacco Never Used     No family history on file      Meds/Allergies     (Not in a hospital admission)    Current Facility-Administered Medications   Medication Dose Route Frequency    LORazepam (ATIVAN) injection 1 mg  1 mg Intravenous Once       Allergies   Allergen Reactions    Iodine - Food Allergy Anaphylaxis    Penicillins Anaphylaxis       Objective   Blood pressure 121/77, pulse 76, temperature 98 1 °F (36 7 °C), temperature source Oral, resp  rate 18, weight 78 kg (171 lb 15 3 oz), SpO2 98 %  Intake/Output Summary (Last 24 hours) at 2/26/2022 1016  Last data filed at 2/26/2022 0540  Gross per 24 hour   Intake 1100 ml   Output --   Net 1100 ml         PHYSICAL EXAM:      General Appearance:   Alert, cooperative, no distress, appears stated age    HEENT:   Normocephalic, atraumatic, anicteric      Neck:  Supple, symmetrical, trachea midline, no adenopathy;    thyroid: no enlargement/tenderness/nodules; no carotid  bruit or JVD    Lungs:   Clear to auscultation bilaterally; no rales, rhonchi or wheezing; respirations unlabored    Heart[de-identified]   S1 and S2 normal; regular rate and rhythm; no murmur, rub, or gallop     Abdomen:   Soft, non-tender, non-distended; normal bowel sounds; no masses, no organomegaly    Genitalia:   Deferred    Rectal:   Deferred    Extremities:  No cyanosis, clubbing or edema    Pulses:  2+ and symmetric all extremities    Skin:  Skin color, texture, turgor normal, no rashes or lesions    Lymph nodes:  No palpable cervical, axillary or inguinal lymphadenopathy        Lab Results:   Admission on 02/26/2022   Component Date Value    WBC 02/26/2022 14 25*    RBC 02/26/2022 6 12*    Hemoglobin 02/26/2022 11 3*    Hematocrit 02/26/2022 36 3*    MCV 02/26/2022 59*    MCH 02/26/2022 18 5*    MCHC 02/26/2022 31 1*    RDW 02/26/2022 18 8*    MPV 02/26/2022 9 1     Platelets 79/07/6011 311     nRBC 02/26/2022 0     Neutrophils Relative 02/26/2022 83*    Immat GRANS % 02/26/2022 0     Lymphocytes Relative 02/26/2022 10*    Monocytes Relative 02/26/2022 6     Eosinophils Relative 02/26/2022 1     Basophils Relative 02/26/2022 0     Neutrophils Absolute 02/26/2022 11 88*    Immature Grans Absolute 02/26/2022 0 05     Lymphocytes Absolute 02/26/2022 1 35     Monocytes Absolute 02/26/2022 0 82     Eosinophils Absolute 02/26/2022 0 12     Basophils Absolute 02/26/2022 0 03     Sodium 02/26/2022 139     Potassium 02/26/2022 3 9     Chloride 02/26/2022 103     CO2 02/26/2022 30     ANION GAP 02/26/2022 6     BUN 02/26/2022 20     Creatinine 02/26/2022 0 86     Glucose 02/26/2022 118     Calcium 02/26/2022 8 8     eGFR 02/26/2022 94        Imaging Studies: I have personally reviewed pertinent imaging studies  ASSESSMENT & PLAN:  Food impaction  Chest pain  -Patient has a history of gastroesophageal reflux disease, hiatal hernia, Zheng is ring  Patient does have a history of food impaction in the past    -Patient is currently NPO for EGD today   -Patient given glucagon trial   -Zofran as needed   -Will check CT chest abdomen and pelvis  -Further recommendations be made after patient's endoscopic evaluation  Weight loss  Bloody diarrhea  History of HCC  -Will follow-up results of CT scan ordered this morning   -Patient will need close outpatient GI follow-up to include a colonoscopy  -Patient's hemoglobin level is 11 3  Patient will be seen examined by Rosita Fritz PA-C  2/26/2022,10:16 AM

## 2022-03-04 NOTE — ED PROVIDER NOTES
History  Chief Complaint   Patient presents with    Foreign Body in Throat     feels like food is stuck in throat,last ate at 5pm, throat pain      64yo male p/w foreign body sensation in his throat for the past 10 hours  States he had steak around 5-6p and since then he feels like piece is stuck  Has had N/V and pain since  He has had esophaeal FBs that needed EGD in the past        Foreign Body in Throat  Location:  Swallowed  Suspected object:  Food (steak)  Pain quality:  Aching and sharp  Pain severity:  Moderate  Duration:  12 hours  Timing:  Constant  Progression:  Unchanged  Chronicity:  New  Worsened by:  Nothing  Ineffective treatments:  None tried  Associated symptoms: cough, drooling, nausea and vomiting    Associated symptoms: no abdominal pain, no congestion, no ear pain, no hearing loss and no nasal discharge        Prior to Admission Medications   Prescriptions Last Dose Informant Patient Reported? Taking?    albuterol (2 5 mg/3 mL) 0 083 % nebulizer solution   Yes No   Sig: Take 2 5 mg by nebulization every 6 (six) hours as needed for wheezing or shortness of breath   lisinopril (ZESTRIL) 10 mg tablet  Self Yes No   Sig: Take 10 mg by mouth daily   metoprolol tartrate (LOPRESSOR) 25 mg tablet   Yes No   Sig: Take 25 mg by mouth every 12 (twelve) hours   nitroglycerin (NITROSTAT) 0 3 mg SL tablet   Yes No   Sig: Place 0 3 mg under the tongue every 5 (five) minutes as needed for chest pain   raNITIdine HCl (ZANTAC 75 PO)   Yes No   Sig: Take by mouth   simvastatin (ZOCOR) 20 mg tablet   Yes No   Sig: Take 20 mg by mouth daily at bedtime      Facility-Administered Medications: None       Past Medical History:   Diagnosis Date    [facility]     Asthma     Cancer ([facility] )     liver    Colon polyp     Coronary artery disease     Heart attack ([facility] )     Hyperlipidemia     Hypertension     Liver disease     Shortness of breath        Past Surgical History:   Procedure Laterality Date    BACK SURGERY      CHOLECYSTECTOMY      COLONOSCOPY      CORONARY ANGIOPLASTY WITH STENT PLACEMENT      HERNIA REPAIR      KNEE ARTHROSCOPY      TONSILLECTOMY         No family history on file  I have reviewed and agree with the history as documented  E-Cigarette/Vaping     E-Cigarette/Vaping Substances     Social History     Tobacco Use    Smoking status: Never Smoker    Smokeless tobacco: Never Used   Substance Use Topics    Alcohol use: Never    Drug use: Never       Review of Systems   HENT: Positive for drooling  Negative for congestion, ear pain and hearing loss  Respiratory: Positive for cough  Gastrointestinal: Positive for nausea and vomiting  Negative for abdominal pain  All other systems reviewed and are negative  Physical Exam  Physical Exam  Vitals and nursing note reviewed  Constitutional:       General: He is not in acute distress  Appearance: He is well-developed  He is not toxic-appearing or diaphoretic  Comments: stting forward in stretcher and intermittently dry heaving and brining up spit   HENT:      Head: Normocephalic and atraumatic  Nose: Nose normal    Eyes:      Conjunctiva/sclera: Conjunctivae normal    Cardiovascular:      Rate and Rhythm: Normal rate and regular rhythm  Heart sounds: Normal heart sounds  Pulmonary:      Effort: Pulmonary effort is normal  No respiratory distress  Breath sounds: Normal breath sounds  Abdominal:      General: There is no distension  Palpations: Abdomen is soft  Tenderness: There is no abdominal tenderness  Musculoskeletal:         General: Normal range of motion  Cervical back: Normal range of motion and neck supple  Skin:     General: Skin is warm and dry  Neurological:      General: No focal deficit present  Mental Status: He is alert and oriented to person, place, and time  Psychiatric:         Mood and Affect: Mood is anxious           Vital Signs  ED Triage Vitals Temperature Pulse Respirations Blood Pressure SpO2   02/26/22 0410 02/26/22 0410 02/26/22 0410 02/26/22 0410 02/26/22 0407   98 1 °F (36 7 °C) 72 22 139/82 94 %      Temp Source Heart Rate Source Patient Position - Orthostatic VS BP Location FiO2 (%)   02/26/22 0410 02/26/22 0410 -- 02/26/22 0715 --   Oral Monitor  Right arm       Pain Score       02/26/22 0821       10 - Worst Possible Pain           Vitals:    02/26/22 1315 02/26/22 1330 02/26/22 1345 02/26/22 1400   BP: 119/75 126/81 134/90 127/75   Pulse: 82 86 93 86         Visual Acuity      ED Medications  Medications   ondansetron (FOR EMS ONLY) (ZOFRAN) 4 mg/2 mL injection 4 mg (0 mg Does not apply Given to EMS 2/26/22 0407)   fentanyl citrate (PF) 100 MCG/2ML 50 mcg (50 mcg Intravenous Given 2/26/22 0429)   glucagon (GLUCAGEN) injection 1 mg (1 mg Intravenous Given 2/26/22 0429)   nitroglycerin (NITROSTAT) SL tablet 0 4 mg (0 4 mg Sublingual Given 2/26/22 0429)   sodium chloride 0 9 % bolus 1,000 mL (0 mL Intravenous Stopped 2/26/22 0540)   fentanyl citrate (PF) 100 MCG/2ML 50 mcg (50 mcg Intravenous Given 2/26/22 0821)   ondansetron (ZOFRAN) injection 4 mg (4 mg Intravenous Given 2/26/22 0926)       Diagnostic Studies  Results Reviewed     Procedure Component Value Units Date/Time    Basic metabolic panel [[id number]] Collected: 02/26/22 0642    Lab Status: Final result Specimen: Blood from Arm, Left Updated: 02/26/22 8396     Sodium 139 mmol/L      Potassium 3 9 mmol/L      Chloride 103 mmol/L      CO2 30 mmol/L      ANION GAP 6 mmol/L      BUN 20 mg/dL      Creatinine 0 86 mg/dL      Glucose 118 mg/dL      Calcium 8 8 mg/dL      eGFR 94 ml/min/1 73sq m     Narrative:      [place] guidelines for Chronic Kidney Disease (CKD):     Stage 1 with normal or high GFR (GFR > 90 mL/min/1 73 square meters)    Stage 2 Mild CKD (GFR = 60-89 mL/min/1 73 square meters)    Stage 3A Moderate CKD (GFR = 45-59 mL/min/1 73 square meters)   Stage 3B Moderate CKD (GFR = 30-44 mL/min/1 73 square meters)    Stage 4 Severe CKD (GFR = 15-29 mL/min/1 73 square meters)    Stage 5 End Stage CKD (GFR <15 mL/min/1 73 square meters)  Note: GFR calculation is accurate only with a steady state creatinine    CBC and differential [[id number]]  (Abnormal) Collected: 02/26/22 0642    Lab Status: Final result Specimen: Blood from Arm, Left Updated: 02/26/22 0648     WBC 14 25 Thousand/uL      RBC 6 12 Million/uL      Hemoglobin 11 3 g/dL      Hematocrit 36 3 %      MCV 59 fL      MCH 18 5 pg      MCHC 31 1 g/dL      RDW 18 8 %      MPV 9 1 fL      Platelets 623 Thousands/uL      nRBC 0 /100 WBCs      Neutrophils Relative 83 %      Immat GRANS % 0 %      Lymphocytes Relative 10 %      Monocytes Relative 6 %      Eosinophils Relative 1 %      Basophils Relative 0 %      Neutrophils Absolute 11 88 Thousands/µL      Immature Grans Absolute 0 05 Thousand/uL      Lymphocytes Absolute 1 35 Thousands/µL      Monocytes Absolute 0 82 Thousand/µL      Eosinophils Absolute 0 12 Thousand/µL      Basophils Absolute 0 03 Thousands/µL                  CT chest abdomen pelvis wo contrast   Final Result by Cristian Early MD (02/26 1034)      No bowel wall thickening, pneumatosis      No colonic wall thickening or pneumatosis      Dilated esophagus with some thickening of the distal esophagus and gastroesophageal junction and surrounding infiltration and thickening of the left lynda of the diaphragm    If concern for esophageal injury or perforation CT esophagram can be considered      No pneumomediastinum or mediastinal fluid collection or pleural effusion       The segment 7 right hepatic lobe lesion, mildly smaller   Segment 2 lesion, minimally decreased to stable      Mild infiltration seen in the short segment of the descending colon at the level of the deep inguinal ring, stable, may be related to prior hernia repair             I personally discussed this study with [facility] on 2/26/2022 at 10:33 AM                Workstation performed: [id number]                    Procedures  Procedures         ED Course                               SBIRT 22yo+      Most Recent Value   SBIRT (23 yo +)    In order to provide better care to our patients, we are screening all of our patients for alcohol and drug use  Would it be okay to ask you these screening questions?  No Filed at: 02/26/2022 0814                    MDM  Number of Diagnoses or Management Options  Foreign body in esophagus, initial encounter: new and requires workup     Amount and/or Complexity of Data Reviewed  Clinical lab tests: ordered  Discuss the patient with other providers: yes (D/w GI, tried pain relief and GI cocktail and meds to relieve food bolus and still with sensation after so GI with do EGD)    Risk of Complications, Morbidity, and/or Mortality  Presenting problems: high  Diagnostic procedures: high  Management options: high    Patient Progress  Patient progress: stable      Disposition  Final diagnoses:   Foreign body in esophagus, initial encounter     Time reflects when diagnosis was documented in both MDM as applicable and the Disposition within this note     Time User Action Codes Description Comment    2/26/2022  6:52 AM Mary Lanza Foreign body in esophagus, initial encounter       ED Disposition     ED Disposition Condition Date/Time Comment    Send to OR  Sat Feb 26, 2022 0653 Pt to be evaluated by GI and add on for EGD      Follow-up Information    None         Discharge Medication List as of 2/26/2022  1:47 PM      CONTINUE these medications which have NOT CHANGED    Details   albuterol (2 5 mg/3 mL) 0 083 % nebulizer solution Take 2 5 mg by nebulization every 6 (six) hours as needed for wheezing or shortness of breath, Historical Med      lisinopril (ZESTRIL) 10 mg tablet Take 10 mg by mouth daily, Historical Med      metoprolol tartrate (LOPRESSOR) 25 mg tablet Take 25 mg by mouth every 12 (twelve) hours, Historical Med      nitroglycerin (NITROSTAT) 0 3 mg SL tablet Place 0 3 mg under the tongue every 5 (five) minutes as needed for chest pain, Historical Med      raNITIdine HCl (ZANTAC 75 PO) Take by mouth, Historical Med      simvastatin (ZOCOR) 20 mg tablet Take 20 mg by mouth daily at bedtime, Historical Med             Outpatient Discharge Orders   Activity as tolerated     Call provider for:  persistent nausea or vomiting     Call provider for:  severe uncontrolled pain     Call provider for: active or persistent bleeding     Call provider for:  difficulty breathing, headache or visual disturbances     Call provider for:  hives     Call provider for:  persistent dizziness or light-headedness     Call provider for:  extreme fatigue       PDMP Review       Value Time User    PDMP Reviewed  Yes 5/15/2020  2:36 PM Elsi King PA-C          ED Provider  Electronically Signed by           Selina Ga MD  03/05/22 2053

## 2022-05-13 ENCOUNTER — HOSPITAL ENCOUNTER (EMERGENCY)
Facility: HOSPITAL | Age: 62
Discharge: HOME | End: 2022-05-13
Attending: EMERGENCY MEDICINE
Payer: COMMERCIAL

## 2022-05-13 ENCOUNTER — APPOINTMENT (EMERGENCY)
Dept: CT IMAGING | Facility: HOSPITAL | Age: 62
End: 2022-05-13
Payer: COMMERCIAL

## 2022-05-13 VITALS
DIASTOLIC BLOOD PRESSURE: 89 MMHG | OXYGEN SATURATION: 99 % | RESPIRATION RATE: 18 BRPM | TEMPERATURE: 98.4 F | SYSTOLIC BLOOD PRESSURE: 154 MMHG | HEART RATE: 89 BPM

## 2022-05-13 DIAGNOSIS — R30.0 DYSURIA: ICD-10-CM

## 2022-05-13 DIAGNOSIS — K40.90 LEFT INGUINAL HERNIA: Primary | ICD-10-CM

## 2022-05-13 LAB
ALBUMIN SERPL BCP-MCNC: 3.8 G/DL (ref 3.5–5)
ALP SERPL-CCNC: 71 U/L (ref 46–116)
ALT SERPL W P-5'-P-CCNC: 28 U/L (ref 12–78)
ANION GAP SERPL CALCULATED.3IONS-SCNC: 10 MMOL/L (ref 4–13)
AST SERPL W P-5'-P-CCNC: 21 U/L (ref 5–45)
BACTERIA UR QL AUTO: ABNORMAL /HPF
BASOPHILS # BLD AUTO: 0.04 THOUSANDS/ΜL (ref 0–0.1)
BASOPHILS NFR BLD AUTO: 0 % (ref 0–1)
BILIRUB DIRECT SERPL-MCNC: 0.24 MG/DL (ref 0–0.2)
BILIRUB SERPL-MCNC: 1.34 MG/DL (ref 0.2–1)
BILIRUB UR QL STRIP: NEGATIVE
BUN SERPL-MCNC: 21 MG/DL (ref 5–25)
CALCIUM SERPL-MCNC: 8.8 MG/DL (ref 8.3–10.1)
CHLORIDE SERPL-SCNC: 99 MMOL/L (ref 100–108)
CLARITY UR: CLEAR
CO2 SERPL-SCNC: 27 MMOL/L (ref 21–32)
COLOR UR: YELLOW
CREAT SERPL-MCNC: 0.93 MG/DL (ref 0.6–1.3)
EOSINOPHIL # BLD AUTO: 0.13 THOUSAND/ΜL (ref 0–0.61)
EOSINOPHIL NFR BLD AUTO: 1 % (ref 0–6)
ERYTHROCYTE [DISTWIDTH] IN BLOOD BY AUTOMATED COUNT: 16.4 % (ref 11.6–15.1)
GFR SERPL CREATININE-BSD FRML MDRD: 88 ML/MIN/1.73SQ M
GLUCOSE SERPL-MCNC: 127 MG/DL (ref 65–140)
GLUCOSE UR STRIP-MCNC: NEGATIVE MG/DL
HCT VFR BLD AUTO: 30.8 % (ref 36.5–49.3)
HGB BLD-MCNC: 9.8 G/DL (ref 12–17)
HGB UR QL STRIP.AUTO: ABNORMAL
IMM GRANULOCYTES # BLD AUTO: 0.07 THOUSAND/UL (ref 0–0.2)
IMM GRANULOCYTES NFR BLD AUTO: 0 % (ref 0–2)
KETONES UR STRIP-MCNC: NEGATIVE MG/DL
LACTATE SERPL-SCNC: 1.3 MMOL/L (ref 0.5–2)
LEUKOCYTE ESTERASE UR QL STRIP: NEGATIVE
LYMPHOCYTES # BLD AUTO: 1.38 THOUSANDS/ΜL (ref 0.6–4.47)
LYMPHOCYTES NFR BLD AUTO: 8 % (ref 14–44)
MCH RBC QN AUTO: 18.8 PG (ref 26.8–34.3)
MCHC RBC AUTO-ENTMCNC: 31.8 G/DL (ref 31.4–37.4)
MCV RBC AUTO: 59 FL (ref 82–98)
MONOCYTES # BLD AUTO: 1.03 THOUSAND/ΜL (ref 0.17–1.22)
MONOCYTES NFR BLD AUTO: 6 % (ref 4–12)
MUCOUS THREADS UR QL AUTO: ABNORMAL
NEUTROPHILS # BLD AUTO: 14.33 THOUSANDS/ΜL (ref 1.85–7.62)
NEUTS SEG NFR BLD AUTO: 85 % (ref 43–75)
NITRITE UR QL STRIP: NEGATIVE
NON-SQ EPI CELLS URNS QL MICRO: ABNORMAL /HPF
NRBC BLD AUTO-RTO: 0 /100 WBCS
PH UR STRIP.AUTO: 5.5 [PH]
PLATELET # BLD AUTO: 233 THOUSANDS/UL (ref 149–390)
PMV BLD AUTO: 9.5 FL (ref 8.9–12.7)
POTASSIUM SERPL-SCNC: 3.6 MMOL/L (ref 3.5–5.3)
PROT SERPL-MCNC: 7 G/DL (ref 6.4–8.2)
PROT UR STRIP-MCNC: NEGATIVE MG/DL
RBC # BLD AUTO: 5.22 MILLION/UL (ref 3.88–5.62)
RBC #/AREA URNS AUTO: ABNORMAL /HPF
SODIUM SERPL-SCNC: 136 MMOL/L (ref 136–145)
SP GR UR STRIP.AUTO: >=1.03 (ref 1–1.03)
UROBILINOGEN UR QL STRIP.AUTO: 0.2 E.U./DL
WBC # BLD AUTO: 16.98 THOUSAND/UL (ref 4.31–10.16)
WBC #/AREA URNS AUTO: ABNORMAL /HPF

## 2022-05-13 PROCEDURE — 96375 TX/PRO/DX INJ NEW DRUG ADDON: CPT

## 2022-05-13 PROCEDURE — 80048 BASIC METABOLIC PNL TOTAL CA: CPT | Performed by: EMERGENCY MEDICINE

## 2022-05-13 PROCEDURE — 99284 EMERGENCY DEPT VISIT MOD MDM: CPT

## 2022-05-13 PROCEDURE — 99244 OFF/OP CNSLTJ NEW/EST MOD 40: CPT | Performed by: SURGERY

## 2022-05-13 PROCEDURE — 99284 EMERGENCY DEPT VISIT MOD MDM: CPT | Performed by: EMERGENCY MEDICINE

## 2022-05-13 PROCEDURE — 83605 ASSAY OF LACTIC ACID: CPT | Performed by: EMERGENCY MEDICINE

## 2022-05-13 PROCEDURE — 87086 URINE CULTURE/COLONY COUNT: CPT | Performed by: EMERGENCY MEDICINE

## 2022-05-13 PROCEDURE — 85025 COMPLETE CBC W/AUTO DIFF WBC: CPT | Performed by: EMERGENCY MEDICINE

## 2022-05-13 PROCEDURE — 96361 HYDRATE IV INFUSION ADD-ON: CPT

## 2022-05-13 PROCEDURE — G1004 CDSM NDSC: HCPCS

## 2022-05-13 PROCEDURE — 81001 URINALYSIS AUTO W/SCOPE: CPT | Performed by: EMERGENCY MEDICINE

## 2022-05-13 PROCEDURE — 96374 THER/PROPH/DIAG INJ IV PUSH: CPT

## 2022-05-13 PROCEDURE — 74176 CT ABD & PELVIS W/O CONTRAST: CPT

## 2022-05-13 PROCEDURE — 36415 COLL VENOUS BLD VENIPUNCTURE: CPT | Performed by: EMERGENCY MEDICINE

## 2022-05-13 PROCEDURE — 80076 HEPATIC FUNCTION PANEL: CPT | Performed by: EMERGENCY MEDICINE

## 2022-05-13 RX ORDER — LEVOFLOXACIN 500 MG/1
500 TABLET, FILM COATED ORAL DAILY
Qty: 5 TABLET | Refills: 0 | Status: SHIPPED | OUTPATIENT
Start: 2022-05-13 | End: 2022-05-16

## 2022-05-13 RX ORDER — ONDANSETRON 4 MG/1
4 TABLET, ORALLY DISINTEGRATING ORAL
COMMUNITY
Start: 2022-01-12

## 2022-05-13 RX ORDER — ONDANSETRON 2 MG/ML
4 INJECTION INTRAMUSCULAR; INTRAVENOUS ONCE
Status: COMPLETED | OUTPATIENT
Start: 2022-05-13 | End: 2022-05-13

## 2022-05-13 RX ORDER — KETOROLAC TROMETHAMINE 30 MG/ML
15 INJECTION, SOLUTION INTRAMUSCULAR; INTRAVENOUS ONCE
Status: COMPLETED | OUTPATIENT
Start: 2022-05-13 | End: 2022-05-13

## 2022-05-13 RX ORDER — PANTOPRAZOLE SODIUM 40 MG/1
40 TABLET, DELAYED RELEASE ORAL DAILY
COMMUNITY
Start: 2021-12-14 | End: 2022-05-16

## 2022-05-13 RX ORDER — OXYCODONE HYDROCHLORIDE AND ACETAMINOPHEN 5; 325 MG/1; MG/1
TABLET ORAL
COMMUNITY
Start: 2022-02-16

## 2022-05-13 RX ORDER — TAMSULOSIN HYDROCHLORIDE 0.4 MG/1
0.4 CAPSULE ORAL DAILY
Status: ON HOLD | COMMUNITY
Start: 2022-01-04 | End: 2022-05-16 | Stop reason: CLARIF

## 2022-05-13 RX ADMIN — ONDANSETRON 4 MG: 2 INJECTION INTRAMUSCULAR; INTRAVENOUS at 09:47

## 2022-05-13 RX ADMIN — SODIUM CHLORIDE 1000 ML: 0.9 INJECTION, SOLUTION INTRAVENOUS at 09:47

## 2022-05-13 RX ADMIN — KETOROLAC TROMETHAMINE 15 MG: 30 INJECTION, SOLUTION INTRAMUSCULAR at 09:46

## 2022-05-13 NOTE — ED PROVIDER NOTES
History  Chief Complaint   Patient presents with    Possible UTI     72-year-old male presents for evaluation of right lower quadrant/pelvic pain  He has a history of recurrent diverticulitis as well as recurrent bilateral inguinal hernias  He is complaining of pain in his left lower quadrant pelvis as well as urinary frequency and dysuria, foul-smelling dark urine  He has been dealing with this problem for a while and has had previous evaluations by Urology and General surgery but admits that he does not always follow-up " States that he has previously seen general surgery through [facility]  Patient states that the surgeon recommended repair of the hernia of but was hesitant due to the fact that patient is being treated for hepatocellular carcinoma and also has COPD requiring oxygen at baseline  Reports that at times he has a bulge in his groin and scrotum that becomes firm and tender  He usually can reduce this by lying flat and applying gentle steady pressure over period of minutes to hours  States that he had similar episode associated with moderate to severe pain earlier but since the hernia has reduced and he no longer has severe pain  He is also complaining of dark, foul-smelling urine and intermittent pain with urination  History provided by:  Patient  Abdominal Pain  Pain location:  LLQ  Pain quality: aching and cramping    Pain radiates to:  Groin  Pain severity:  Moderate  Onset quality:  Gradual  Timing:  Intermittent  Progression:  Waxing and waning  Chronicity:  Recurrent  Relieved by:  Nothing  Worsened by:  Nothing  Ineffective treatments:  None tried  Associated symptoms: dysuria    Associated symptoms: no chest pain, no chills, no cough, no fever, no hematuria, no nausea, no shortness of breath, no sore throat and no vomiting        Prior to Admission Medications   Prescriptions Last Dose Informant Patient Reported? Taking?    Probiotic Product (Bacid) TABS   Yes Yes Sig: Take 1 tablet by mouth in the morning and 1 tablet in the evening  albuterol (2 5 mg/3 mL) 0 083 % nebulizer solution   Yes Yes   Sig: Take 2 5 mg by nebulization every 6 (six) hours as needed for wheezing or shortness of breath   lisinopril (ZESTRIL) 10 mg tablet  Self Yes Yes   Sig: Take 10 mg by mouth daily   metoprolol tartrate (LOPRESSOR) 25 mg tablet   Yes Yes   Sig: Take 25 mg by mouth every 12 (twelve) hours   nitroglycerin (NITROSTAT) 0 3 mg SL tablet   Yes Yes   Sig: Place 0 3 mg under the tongue every 5 (five) minutes as needed for chest pain   ondansetron (ZOFRAN-ODT) 4 mg disintegrating tablet   Yes Yes   Sig: Take 4 mg by mouth   oxyCODONE-acetaminophen (PERCOCET) 5-325 mg per tablet   Yes No   Sig: TAKE 1 TAB AS NEEDED EVERY 6 HOURS DAY 1, 1 TAB EVERY 12 HOURS DAYS 2 1 TAB ONCE ON DAY 3    pantoprazole (PROTONIX) 40 mg tablet   Yes Yes   Sig: Take 40 mg by mouth in the morning  raNITIdine HCl (ZANTAC 75 PO)   Yes Yes   Sig: Take by mouth   simvastatin (ZOCOR) 20 mg tablet   Yes Yes   Sig: Take 20 mg by mouth daily at bedtime   tamsulosin (FLOMAX) 0 4 mg   Yes Yes   Sig: Take 0 4 mg by mouth in the morning  Facility-Administered Medications: None       Past Medical History:   Diagnosis Date    [facility]ina pectoris ([facility] [address] )     Asthma     Cancer ([facility] )     liver    Colon polyp     Coronary artery disease     [facility]     Hyperlipidemia     Hypertension     Liver disease     Shortness of breath        Past Surgical History:   Procedure Laterality Date    BACK SURGERY      CHOLECYSTECTOMY      COLONOSCOPY      CORONARY ANGIOPLASTY WITH STENT PLACEMENT      HERNIA REPAIR      KNEE ARTHROSCOPY      TONSILLECTOMY         History reviewed  No pertinent family history  I have reviewed and agree with the history as documented      E-Cigarette/Vaping    E-Cigarette Use Never User      E-Cigarette/Vaping Substances    Nicotine No     THC No     CBD No     Flavoring No     Other No     Unknown No      Social History     Tobacco Use    Smoking status: Never Smoker    Smokeless tobacco: Never Used   Vaping Use    Vaping Use: Never used   Substance Use Topics    Alcohol use: Never    Drug use: Never       Review of Systems   Constitutional: Negative for chills and fever  HENT: Negative for ear pain and sore throat  Eyes: Negative for pain and visual disturbance  Respiratory: Negative for cough and shortness of breath  Cardiovascular: Negative for chest pain and palpitations  Gastrointestinal: Positive for abdominal pain  Negative for nausea and vomiting  Genitourinary: Positive for dysuria and scrotal swelling  Negative for hematuria  Musculoskeletal: Negative for arthralgias and back pain  Skin: Negative for color change and rash  Neurological: Negative for seizures and syncope  All other systems reviewed and are negative  Physical Exam  Physical Exam  Vitals and nursing note reviewed  Constitutional:       Appearance: He is well-developed  HENT:      Head: Normocephalic and atraumatic  Eyes:      Conjunctiva/sclera: Conjunctivae normal    Cardiovascular:      Rate and Rhythm: Normal rate and regular rhythm  Heart sounds: No murmur heard  Pulmonary:      Effort: Pulmonary effort is normal  No respiratory distress  Breath sounds: Normal breath sounds  Abdominal:      Palpations: Abdomen is soft  Tenderness: There is abdominal tenderness  Hernia: A hernia is present  Comments: Small residual left inguinal hernia is noted  Does seem to reduce through a defect though causes patient discomfort  There is some left lower quadrant tenderness without rebound or guarding  Abdomen is soft and nondistended  Musculoskeletal:      Cervical back: Neck supple  Skin:     General: Skin is warm and dry  Capillary Refill: Capillary refill takes less than 2 seconds  Neurological:      General: No focal deficit present  Mental Status: He is alert and oriented to person, place, and time  Vital Signs  ED Triage Vitals   Temperature Pulse Respirations Blood Pressure SpO2   05/13/22 0855 05/13/22 0855 05/13/22 0855 05/13/22 0855 05/13/22 0855   98 4 °F (36 9 °C) 98 20 130/80 96 %      Temp Source Heart Rate Source Patient Position - Orthostatic VS BP Location FiO2 (%)   05/13/22 0855 05/13/22 0855 05/13/22 0855 05/13/22 0855 --   Oral Monitor Sitting Left arm       Pain Score       05/13/22 0946       5           Vitals:    05/13/22 1300 05/13/22 1330 05/13/22 1430 05/13/22 1445   BP:  115/74 103/74 103/74   Pulse: 81 98 76 77   Patient Position - Orthostatic VS:             Visual Acuity      ED Medications  Medications   barium (READI-CAT 2) suspension 900 mL (has no administration in time range)   sodium chloride 0 9 % bolus 1,000 mL (0 mL Intravenous Stopped 5/13/22 1159)   ondansetron (ZOFRAN) injection 4 mg (4 mg Intravenous Given 5/13/22 0947)   ketorolac (TORADOL) injection 15 mg (15 mg Intravenous Given 5/13/22 0946)       Diagnostic Studies  Results Reviewed     Procedure Component Value Units Date/Time    Urine Microscopic [[id number]]  (Abnormal) Collected: 05/13/22 0945    Lab Status: Final result Specimen: Urine, Clean Catch Updated: 05/13/22 1041     RBC, UA 1-2 /hpf      WBC, UA 0-1 /hpf      Epithelial Cells None Seen /hpf      Bacteria, UA None Seen /hpf      MUCUS THREADS Occasional    Lactic acid [[id number]]  (Normal) Collected: 05/13/22 0945    Lab Status: Final result Specimen: Blood from Arm, Left Updated: 05/13/22 1011     LACTIC ACID 1 3 mmol/L     Narrative:      Result may be elevated if tourniquet was used during collection      Basic metabolic panel [[id number]]  (Abnormal) Collected: 05/13/22 0945    Lab Status: Final result Specimen: Blood from Arm, Left Updated: 05/13/22 1008     Sodium 136 mmol/L      Potassium 3 6 mmol/L      Chloride 99 mmol/L      CO2 27 mmol/L      ANION GAP 10 mmol/L      BUN 21 mg/dL      Creatinine 0 93 mg/dL      Glucose 127 mg/dL      Calcium 8 8 mg/dL      eGFR 88 ml/min/1 73sq m     Narrative:      National Kidney Disease Foundation guidelines for Chronic Kidney Disease (CKD):     Stage 1 with normal or high GFR (GFR > 90 mL/min/1 73 square meters)    Stage 2 Mild CKD (GFR = 60-89 mL/min/1 73 square meters)    Stage 3A Moderate CKD (GFR = 45-59 mL/min/1 73 square meters)    Stage 3B Moderate CKD (GFR = 30-44 mL/min/1 73 square meters)    Stage 4 Severe CKD (GFR = 15-29 mL/min/1 73 square meters)    Stage 5 End Stage CKD (GFR <15 mL/min/1 73 square meters)  Note: GFR calculation is accurate only with a steady state creatinine    Hepatic function panel [[id number]]  (Abnormal) Collected: 05/13/22 0945    Lab Status: Final result Specimen: Blood from Arm, Left Updated: 05/13/22 1008     Total Bilirubin 1 34 mg/dL      Bilirubin, Direct 0 24 mg/dL      Alkaline Phosphatase 71 U/L      AST 21 U/L      ALT 28 U/L      Total Protein 7 0 g/dL      Albumin 3 8 g/dL     UA (URINE) with reflex to Scope [[id number]]  (Abnormal) Collected: 05/13/22 0945    Lab Status: Final result Specimen: Urine, Clean Catch Updated: 05/13/22 1000     Color, UA Yellow     Clarity, UA Clear     Specific Gravity, UA >=1 030     pH, UA 5 5     Leukocytes, UA Negative     Nitrite, UA Negative     Protein, UA Negative mg/dl      Glucose, UA Negative mg/dl      Ketones, UA Negative mg/dl      Urobilinogen, UA 0 2 E U /dl      Bilirubin, UA Negative     Blood, UA Small    CBC and differential [[id number]]  (Abnormal) Collected: 05/13/22 0945    Lab Status: Final result Specimen: Blood from Arm, Left Updated: 05/13/22 0951     WBC 16 98 Thousand/uL      RBC 5 22 Million/uL      Hemoglobin 9 8 g/dL      Hematocrit 30 8 %      MCV 59 fL      MCH 18 8 pg      MCHC 31 8 g/dL      RDW 16 4 %      MPV 9 5 fL      Platelets 190 Thousands/uL      nRBC 0 /100 WBCs      Neutrophils Relative 85 %      Immat GRANS % 0 % Lymphocytes Relative 8 %      Monocytes Relative 6 %      Eosinophils Relative 1 %      Basophils Relative 0 %      Neutrophils Absolute 14 33 Thousands/µL      Immature Grans Absolute 0 07 Thousand/uL      Lymphocytes Absolute 1 38 Thousands/µL      Monocytes Absolute 1 03 Thousand/µL      Eosinophils Absolute 0 13 Thousand/µL      Basophils Absolute 0 04 Thousands/µL     Urine culture [[id number]] Collected: 05/13/22 0945    Lab Status: In process Specimen: Urine, Clean Catch Updated: 05/13/22 0949                 CT abdomen pelvis wo contrast   Final Result by Karen Chapman MD (05/13 1151)      1  Colonic diverticulosis without associated diverticulitis  Left inguinal hernia containing nonobstructed colon  2   Hepatic lesions are slightly larger than on the previous exam    3   No evidence for colovesical fistula  Workstation performed: [id number]                    Procedures  Procedures         ED Course                               SBIRT 20yo+    Flowsheet Row Most Recent Value   SBIRT (23 yo +)    In order to provide better care to our patients, we are screening all of our patients for alcohol and drug use  Would it be okay to ask you these screening questions? Yes Filed at: 05/13/2022 2876   Initial Alcohol Screen: US AUDIT-C     1  How often do you have a drink containing alcohol? 0 Filed at: 05/13/2022 0937   2  How many drinks containing alcohol do you have on a typical day you are drinking? 0 Filed at: 05/13/2022 0937   3a  Male UNDER 65: How often do you have five or more drinks on one occasion? 0 Filed at: 05/13/2022 0937   3b  FEMALE Any Age, or MALE 65+: How often do you have 4 or more drinks on one occassion? 0 Filed at: 05/13/2022 0937   Audit-C Score 0 Filed at: 05/13/2022 6346   EVAN: How many times in the past year have you    Used an illegal drug or used a prescription medication for non-medical reasons?  Never Filed at: 05/13/2022 8058                    [facility]  Number of Diagnoses or Management Options  Dysuria: new and requires workup  Left inguinal hernia: new and requires workup  Diagnosis management comments: 43-year-old male with multiple medical problems presented for recurrent left-sided inguinal hernia and dysuria  Was noted to have white blood cell count of around 17 K  To CT scan of the abdomen was performed without IV contrast due to a national contrast shortage  Oral contrast was administered  CT imaging showed left inguinal hernia containing nonobstructed large bowel but no signs of incarceration or bowel obstruction  No sign of fistula  Patient did have leukocytosis but was afebrile with otherwise normal vital signs and urinalysis without clear sign of infection  Given history of recurrent diverticulitis and persistent dysuria will trial course of antibiotics  Patient was seen and evaluated by General surgery  They recommend follow-up in the office but see no indication for immediate intervention at this time  Discussed this with patient at length who is in agreement with plan  We discussed strict return precautions  Amount and/or Complexity of Data Reviewed  Clinical lab tests: reviewed and ordered  Tests in the radiology section of CPT®: reviewed and ordered  Review and summarize past medical records: yes  Discuss the patient with other providers: yes  Independent visualization of images, tracings, or specimens: yes        Disposition  Final diagnoses:   Left inguinal hernia   Dysuria     Time reflects when diagnosis was documented in both MDM as applicable and the Disposition within this note     Time User Action Codes Description Comment    5/13/2022  3:23 PM Ricky Trevino [K40 90] Left inguinal hernia     5/13/2022  4:03 PM Ricky Trevino [R30 0] Dysuria       ED Disposition     ED Disposition   Discharge    Condition   Stable    Date/Time   Fri May 13, 2022  3:54 PM    Comment   Cara Meyer discharge to home/self care  Follow-up Information     Follow up With Specialties Details Why Contact Info Additional Information    Gilson Duffy MD    [address] [facility] [address] [address] [facility] General Surgery   [address]  [facility] [place] [address]  [facility], [facility], [place], [address]    [address] [facility] Urology   [address] [facility] [place] [address] [phone] [address] [place] [address] Urology [facility], [phone], [address], [facility], [place], [address]    Elijah Bolaños MD General Surgery Schedule an appointment as soon as possible for a visit in 1 week(s)  [address]  [address]  [facility] [place] [phone]             Patient's Medications   Discharge Prescriptions    LEVOFLOXACIN (LEVAQUIN) 500 MG TABLET    Take 1 tablet (500 mg total) by mouth in the morning for 5 days  Start Date: 5/13/2022 End Date: 5/18/2022       Order Dose: 500 mg       Quantity: 5 tablet    Refills: 0       No discharge procedures on file      PDMP Review       Value Time User    PDMP Reviewed  Yes 5/15/2020  2:36 PM Janessa Floyd PA-C          ED Provider  Electronically Signed by           Emily Roldan MD  05/13/22 9843

## 2022-05-13 NOTE — Clinical Note
Oziel Zhu was seen and treated in our emergency department on 5/13/2022  Diagnosis:     Casandra Be  may return to work on return date  He may return on this date: 05/16/2022         If you have any questions or concerns, please don't hesitate to call        Marco Oconnell MD    ______________________________           _______________          _______________  Hospital Representative                              Date                                Time

## 2022-05-13 NOTE — CONSULTS
Consultation - General Surgery   Ed Palmer 62 y o  male MRN: [id number]  Unit/Bed#: ED 20 Encounter: [id number]    ASSESSMENT:  81-year-old male with recurrent left inguinal hernia  - as per patient's records the patient had a left inguinal hernia repair with mesh at [facility]    CAD status post PCI    HTN    Cardiomyopathy    DM    BPH    Hx Peptic ulcer disease    Hx of lap lori, L inguinal hernia repair, ventral hernia repair    PLAN:  - no indication for acute surgical intervention at this time  Patient's left inguinal hernia is reducible and soft though patient complains of some soreness  - patient will require outpatient clearance from PCP and Cardiology, given his past medical history  - patient okay to follow up as an outpatient as long as he continues to have bowel function and does not exhibit any obstructing symptoms or incarcerated hernia  - continue to follow-up with gastroenterology and undergo EGD and colonoscopy as previously scheduled    _______________________________________________________________  Physician Requesting Consult: Maldonado Flores MD    Additional consultants:  None    Reason for Consult / Principal Problem:  Left inguinal hernia with containing nonobstructed colon     HPI: Ed Palmer is a 62y o  year old male with past medical history of CAD status PCI, hypertension, cardiomyopathy, diabetes mellitus, BPH, history of peptic ulcer disease, and intra-abdominal surgery including lap choly, and laparoscopic left inguinal hernia repair with mesh, and ventral hernia repair, who presents emergency department with complaint of incarcerated left inguinal hernia as well as urinary hesitancy/frequency with foul-smelling urine  Patient reports that he has been having some issues with the left inguinal hernia and a becomes incarcerated at times requiring him to self reduce though it is painful    Patient reports having incarcerated hernia this morning he was able to reduce and just exhibiting some soreness at time of arrival emergency department  Additionally the patient reports having some foul-smelling urine and urinary hesitancy/frequency for quite some time  Also he reports that he has been seen GI as an outpatient as he is unable to have adequate p o  Intake without vomiting, and has been having loose stools/diarrhea for several months  Patient reports being a schedule on June 17th for EGD and colonoscopy at [facility]  On arrival to the emergency department the patient's WBC was found to be 16, and CT imaging revealed colonic diverticulosis without associated diverticulitis, as well as left inguinal hernia containing nonobstructed colon, and no evidence of colovesicular fistula noted  Patient denies any chest pain, shortness of breath, palpitations, fevers, chills, recent changes in bowel habits, constipation, melena, hematochezia, hematemesis  Historical Information   Past Medical History:   Diagnosis Date    Angina pectoris ([facility] )     Asthma     Cancer ([facility] )     liver    Colon polyp     Coronary artery disease     Heart attack ([facility] )     Hyperlipidemia     Hypertension     Liver disease     Shortness of breath      Past Surgical History:   Procedure Laterality Date    BACK SURGERY      CHOLECYSTECTOMY      COLONOSCOPY      CORONARY ANGIOPLASTY WITH STENT PLACEMENT      HERNIA REPAIR      KNEE ARTHROSCOPY      TONSILLECTOMY       Social History   Social History     Substance and Sexual Activity   Alcohol Use Never     Social History     Substance and Sexual Activity   Drug Use Never     Social History     Tobacco Use   Smoking Status Never Smoker   Smokeless Tobacco Never Used     Family History: non-contributory}    Meds/Allergies   Home meds:   Prior to Admission medications    Medication Sig Start Date End Date Taking?  Authorizing Provider   albuterol (2 5 mg/3 mL) 0 083 % nebulizer solution Take 2 5 mg by nebulization every 6 (six) hours as needed for wheezing or shortness of breath   Yes Historical Provider, MD   lisinopril (ZESTRIL) 10 mg tablet Take 10 mg by mouth daily   Yes Historical Provider, MD   metoprolol tartrate (LOPRESSOR) 25 mg tablet Take 25 mg by mouth every 12 (twelve) hours   Yes Historical Provider, MD   nitroglycerin (NITROSTAT) 0 3 mg SL tablet Place 0 3 mg under the tongue every 5 (five) minutes as needed for chest pain   Yes Historical Provider, MD   ondansetron (ZOFRAN-ODT) 4 mg disintegrating tablet Take 4 mg by mouth 1/12/22  Yes Historical Provider, MD   pantoprazole (PROTONIX) 40 mg tablet Take 40 mg by mouth in the morning  12/14/21  Yes Historical Provider, MD   Probiotic Product (Bacid) TABS Take 1 tablet by mouth in the morning and 1 tablet in the evening  11/23/21  Yes Historical Provider, MD   raNITIdine HCl (ZANTAC 75 PO) Take by mouth   Yes Historical Provider, MD   simvastatin (ZOCOR) 20 mg tablet Take 20 mg by mouth daily at bedtime   Yes Historical Provider, MD   tamsulosin (FLOMAX) 0 4 mg Take 0 4 mg by mouth in the morning   1/4/22  Yes Historical Provider, MD   oxyCODONE-acetaminophen (PERCOCET) 5-325 mg per tablet TAKE 1 TAB AS NEEDED EVERY 6 HOURS DAY 1, 1 TAB EVERY 12 HOURS DAYS 2 1 TAB ONCE ON DAY 3  2/16/22   Historical Provider, MD     Scheduled Meds:  Current Facility-Administered Medications   Medication Dose Route Frequency Provider Last Rate    barium  900 mL Oral Once in imaging Cher Ferreira MD       Continuous Infusions:   PRN Meds:    barium    ALLERGIES:   Allergies   Allergen Reactions    Iodine - Food Allergy Anaphylaxis    Penicillins Anaphylaxis       Review of Systems:  General: negative  Cardiovascular: no chest pain or dyspnea on exertion  Respiratory: no cough, shortness of breath, or wheezing  Gastrointestinal:  Frequent postprandial emesis, diarrhea  Genitourinary: positive for - change in urinary stream and frequency  Musculoskeletal: negative  Neurological: no TIA or stroke symptoms  Hematological and Lymphatic: negative  Dermatological : negative  Psychological: negative  Ophthalmic: negative  ENT: negative      Objective   Vitals:  Blood pressure 103/74, pulse 77, temperature 98 4 °F (36 9 °C), temperature source Oral, resp  rate 19, SpO2 97 %  There is no height or weight on file to calculate BMI  SpO2: SpO2: 97 %    I/Os:  I/O     None          Invasive Lines/Tubes:  Invasive Devices  Report    Peripheral Intravenous Line  Duration           Peripheral IV 05/13/22 Distal;Left;Upper;Ventral (anterior) Arm <1 day                Physical Exam  General appearance: alert, appears stated age and cooperative  Head: Normocephalic, without obvious abnormality, atraumatic  Eyes: conjunctivae/corneas clear  PERRL, EOM's intact    Lungs: clear to auscultation bilaterally  Chest wall: no tenderness  Heart[de-identified] regular rate and rhythm and S1, S2 normal  Abdomen: soft, non-tender; bowel sounds normal; no masses,  no organomegaly  Genitalia: normal, left inguinal hernia defect noted, left inguinal hernia is reducible, left testicle slightly tender to palpation  Rectal: deferred   Extremities: extremities normal, atraumatic, no cyanosis or edema  Skin: Skin color, texture, turgor normal  No rashes or lesions  Neurologic: Grossly normal    Lab Results and Cultures:   CBC:   Results from last 7 days   Lab Units 05/13/22  0945   WBC Thousand/uL 16 98*   HEMOGLOBIN g/dL 9 8*   HEMATOCRIT % 30 8*   PLATELETS Thousands/uL 233     BMP/CMP:  Results from last 7 days   Lab Units 05/13/22  0945   POTASSIUM mmol/L 3 6   CHLORIDE mmol/L 99*   CO2 mmol/L 27   BUN mg/dL 21   CREATININE mg/dL 0 93   CALCIUM mg/dL 8 8     Coags:     Lipid panel:     HgbA1c:   Lab Results   Component Value Date    HGBA1C 5 5 06/24/2020       Urinalysis:   Lab Results   Component Value Date    COLORU Yellow 05/13/2022    CLARITYU Clear 05/13/2022    SPECGRAV >=1 030 05/13/2022    PHUR 5 5 05/13/2022    LEUKOCYTESUR Negative 05/13/2022    NITRITE Negative 05/13/2022    GLUCOSEU Negative 05/13/2022    KETONESU Negative 05/13/2022    BILIRUBINUR Negative 05/13/2022    BLOODU Small (A) 05/13/2022   ,   Urine Culture: No results found for: URINECX  Wound Culure: No results found for: WOUNDCULT  Blood Culture: No results found for: BLOODCX    Imaging Studies: I have personally reviewed pertinent reports  and I have personally reviewed pertinent films in PACS  EKG, Pathology, and Other Studies: I have personally reviewed pertinent reports     and I have personally reviewed pertinent films in PACS  VTE Prophylaxis: Sequential compression device Yvone Loupe)      Code Status: Prior  Advance Directive and Living Will:      Power of :    POLST:      Counseling / Coordination of Care  None       iLndsey Landis  5/13/2022

## 2022-05-14 LAB — BACTERIA UR CULT: NORMAL

## 2022-05-16 ENCOUNTER — APPOINTMENT (EMERGENCY)
Dept: CT IMAGING | Facility: HOSPITAL | Age: 62
End: 2022-05-16
Payer: COMMERCIAL

## 2022-05-16 ENCOUNTER — HOSPITAL ENCOUNTER (OUTPATIENT)
Facility: HOSPITAL | Age: 62
Setting detail: OUTPATIENT SURGERY
Discharge: HOME | End: 2022-05-17
Attending: EMERGENCY MEDICINE | Admitting: SURGERY
Payer: COMMERCIAL

## 2022-05-16 ENCOUNTER — APPOINTMENT (OUTPATIENT)
Dept: RADIOLOGY | Facility: HOSPITAL | Age: 62
End: 2022-05-16
Payer: COMMERCIAL

## 2022-05-16 DIAGNOSIS — K40.90 LEFT INGUINAL HERNIA: ICD-10-CM

## 2022-05-16 DIAGNOSIS — R06.2 WHEEZING: ICD-10-CM

## 2022-05-16 DIAGNOSIS — J43.9 PULMONARY EMPHYSEMA, UNSPECIFIED EMPHYSEMA TYPE (HCC): ICD-10-CM

## 2022-05-16 DIAGNOSIS — D64.9 ANEMIA, UNSPECIFIED TYPE: ICD-10-CM

## 2022-05-16 DIAGNOSIS — R10.30 LOWER ABDOMINAL PAIN: Primary | ICD-10-CM

## 2022-05-16 DIAGNOSIS — I25.10 CORONARY ARTERY DISEASE INVOLVING NATIVE HEART WITHOUT ANGINA PECTORIS, UNSPECIFIED VESSEL OR LESION TYPE: ICD-10-CM

## 2022-05-16 DIAGNOSIS — N91.2 AMENIA: ICD-10-CM

## 2022-05-16 DIAGNOSIS — I48.91 ATRIAL FIBRILLATION, UNSPECIFIED TYPE (HCC): ICD-10-CM

## 2022-05-16 DIAGNOSIS — R00.2 PALPITATIONS: ICD-10-CM

## 2022-05-16 DIAGNOSIS — I10 HYPERTENSION, UNSPECIFIED TYPE: ICD-10-CM

## 2022-05-16 PROBLEM — D50.9 IRON DEFICIENCY ANEMIA: Status: ACTIVE | Noted: 2022-05-16

## 2022-05-16 PROBLEM — J44.9 COPD (CHRONIC OBSTRUCTIVE PULMONARY DISEASE) (HCC): Status: ACTIVE | Noted: 2021-12-18

## 2022-05-16 LAB
2HR DELTA HS TROPONIN: 0 NG/L
ALBUMIN SERPL BCP-MCNC: 3.6 G/DL (ref 3.5–5)
ALP SERPL-CCNC: 78 U/L (ref 46–116)
ALT SERPL W P-5'-P-CCNC: 22 U/L (ref 12–78)
ANION GAP SERPL CALCULATED.3IONS-SCNC: 7 MMOL/L (ref 4–13)
APTT PPP: 31 SECONDS (ref 23–37)
AST SERPL W P-5'-P-CCNC: 18 U/L (ref 5–45)
ATRIAL RATE: 61 BPM
ATRIAL RATE: 75 BPM
ATRIAL RATE: 90 BPM
BASOPHILS # BLD AUTO: 0.03 THOUSANDS/ΜL (ref 0–0.1)
BASOPHILS NFR BLD AUTO: 0 % (ref 0–1)
BILIRUB SERPL-MCNC: 0.82 MG/DL (ref 0.2–1)
BUN SERPL-MCNC: 23 MG/DL (ref 5–25)
CALCIUM SERPL-MCNC: 8.5 MG/DL (ref 8.3–10.1)
CARDIAC TROPONIN I PNL SERPL HS: 4 NG/L
CARDIAC TROPONIN I PNL SERPL HS: 4 NG/L
CHLORIDE SERPL-SCNC: 104 MMOL/L (ref 100–108)
CO2 SERPL-SCNC: 27 MMOL/L (ref 21–32)
CREAT SERPL-MCNC: 1.09 MG/DL (ref 0.6–1.3)
EOSINOPHIL # BLD AUTO: 0.14 THOUSAND/ΜL (ref 0–0.61)
EOSINOPHIL NFR BLD AUTO: 1 % (ref 0–6)
ERYTHROCYTE [DISTWIDTH] IN BLOOD BY AUTOMATED COUNT: 17.5 % (ref 11.6–15.1)
GFR SERPL CREATININE-BSD FRML MDRD: 73 ML/MIN/1.73SQ M
GLUCOSE SERPL-MCNC: 184 MG/DL (ref 65–140)
HCT VFR BLD AUTO: 30.6 % (ref 36.5–49.3)
HGB BLD-MCNC: 9.6 G/DL (ref 12–17)
IMM GRANULOCYTES # BLD AUTO: 0.06 THOUSAND/UL (ref 0–0.2)
IMM GRANULOCYTES NFR BLD AUTO: 1 % (ref 0–2)
INR PPP: 1.07 (ref 0.84–1.19)
LIPASE SERPL-CCNC: 33 U/L (ref 73–393)
LYMPHOCYTES # BLD AUTO: 0.99 THOUSANDS/ΜL (ref 0.6–4.47)
LYMPHOCYTES NFR BLD AUTO: 8 % (ref 14–44)
MCH RBC QN AUTO: 18.8 PG (ref 26.8–34.3)
MCHC RBC AUTO-ENTMCNC: 31.4 G/DL (ref 31.4–37.4)
MCV RBC AUTO: 60 FL (ref 82–98)
MONOCYTES # BLD AUTO: 0.67 THOUSAND/ΜL (ref 0.17–1.22)
MONOCYTES NFR BLD AUTO: 5 % (ref 4–12)
NEUTROPHILS # BLD AUTO: 10.59 THOUSANDS/ΜL (ref 1.85–7.62)
NEUTS SEG NFR BLD AUTO: 85 % (ref 43–75)
NRBC BLD AUTO-RTO: 0 /100 WBCS
P AXIS: 35 DEGREES
P AXIS: 39 DEGREES
P AXIS: 54 DEGREES
PLATELET # BLD AUTO: 293 THOUSANDS/UL (ref 149–390)
PMV BLD AUTO: 9.6 FL (ref 8.9–12.7)
POTASSIUM SERPL-SCNC: 3.7 MMOL/L (ref 3.5–5.3)
PR INTERVAL: 168 MS
PR INTERVAL: 176 MS
PR INTERVAL: 180 MS
PROT SERPL-MCNC: 7 G/DL (ref 6.4–8.2)
PROTHROMBIN TIME: 13.5 SECONDS (ref 11.6–14.5)
QRS AXIS: 61 DEGREES
QRS AXIS: 75 DEGREES
QRS AXIS: 8 DEGREES
QRSD INTERVAL: 102 MS
QRSD INTERVAL: 92 MS
QRSD INTERVAL: 94 MS
QT INTERVAL: 362 MS
QT INTERVAL: 400 MS
QT INTERVAL: 422 MS
QTC INTERVAL: 424 MS
QTC INTERVAL: 442 MS
QTC INTERVAL: 446 MS
RBC # BLD AUTO: 5.1 MILLION/UL (ref 3.88–5.62)
SODIUM SERPL-SCNC: 138 MMOL/L (ref 136–145)
T WAVE AXIS: 27 DEGREES
T WAVE AXIS: 46 DEGREES
T WAVE AXIS: 9 DEGREES
VENTRICULAR RATE: 61 BPM
VENTRICULAR RATE: 75 BPM
VENTRICULAR RATE: 90 BPM
WBC # BLD AUTO: 12.48 THOUSAND/UL (ref 4.31–10.16)

## 2022-05-16 PROCEDURE — 93005 ELECTROCARDIOGRAM TRACING: CPT

## 2022-05-16 PROCEDURE — 96361 HYDRATE IV INFUSION ADD-ON: CPT

## 2022-05-16 PROCEDURE — 99215 OFFICE O/P EST HI 40 MIN: CPT | Performed by: INTERNAL MEDICINE

## 2022-05-16 PROCEDURE — 99220 PR INITIAL OBSERVATION CARE/DAY 70 MINUTES: CPT | Performed by: PHYSICIAN ASSISTANT

## 2022-05-16 PROCEDURE — 99285 EMERGENCY DEPT VISIT HI MDM: CPT

## 2022-05-16 PROCEDURE — 74176 CT ABD & PELVIS W/O CONTRAST: CPT

## 2022-05-16 PROCEDURE — 71046 X-RAY EXAM CHEST 2 VIEWS: CPT

## 2022-05-16 PROCEDURE — 85025 COMPLETE CBC W/AUTO DIFF WBC: CPT

## 2022-05-16 PROCEDURE — 83690 ASSAY OF LIPASE: CPT

## 2022-05-16 PROCEDURE — 96374 THER/PROPH/DIAG INJ IV PUSH: CPT

## 2022-05-16 PROCEDURE — 80053 COMPREHEN METABOLIC PANEL: CPT

## 2022-05-16 PROCEDURE — 85610 PROTHROMBIN TIME: CPT | Performed by: SURGERY

## 2022-05-16 PROCEDURE — 36415 COLL VENOUS BLD VENIPUNCTURE: CPT

## 2022-05-16 PROCEDURE — 84484 ASSAY OF TROPONIN QUANT: CPT | Performed by: SURGERY

## 2022-05-16 PROCEDURE — 85730 THROMBOPLASTIN TIME PARTIAL: CPT | Performed by: SURGERY

## 2022-05-16 PROCEDURE — 94664 DEMO&/EVAL PT USE INHALER: CPT

## 2022-05-16 PROCEDURE — 93010 ELECTROCARDIOGRAM REPORT: CPT | Performed by: INTERNAL MEDICINE

## 2022-05-16 PROCEDURE — 94760 N-INVAS EAR/PLS OXIMETRY 1: CPT

## 2022-05-16 RX ORDER — SODIUM CHLORIDE, SODIUM LACTATE, POTASSIUM CHLORIDE, CALCIUM CHLORIDE 600; 310; 30; 20 MG/100ML; MG/100ML; MG/100ML; MG/100ML
125 INJECTION, SOLUTION INTRAVENOUS CONTINUOUS
Status: DISCONTINUED | OUTPATIENT
Start: 2022-05-16 | End: 2022-05-17 | Stop reason: HOSPADM

## 2022-05-16 RX ORDER — NITROGLYCERIN 0.4 MG/1
0.4 TABLET SUBLINGUAL
Status: DISCONTINUED | OUTPATIENT
Start: 2022-05-16 | End: 2022-05-17 | Stop reason: HOSPADM

## 2022-05-16 RX ORDER — KETOROLAC TROMETHAMINE 30 MG/ML
15 INJECTION, SOLUTION INTRAMUSCULAR; INTRAVENOUS ONCE
Status: COMPLETED | OUTPATIENT
Start: 2022-05-16 | End: 2022-05-16

## 2022-05-16 RX ORDER — HEPARIN SODIUM 5000 [USP'U]/ML
5000 INJECTION, SOLUTION INTRAVENOUS; SUBCUTANEOUS EVERY 8 HOURS SCHEDULED
Status: DISCONTINUED | OUTPATIENT
Start: 2022-05-16 | End: 2022-05-17 | Stop reason: HOSPADM

## 2022-05-16 RX ORDER — HYDROCODONE BITARTRATE AND ACETAMINOPHEN 5; 325 MG/1; MG/1
1 TABLET ORAL
Status: ON HOLD | COMMUNITY
Start: 2022-04-01 | End: 2022-05-16 | Stop reason: CLARIF

## 2022-05-16 RX ORDER — PANTOPRAZOLE SODIUM 40 MG/1
40 TABLET, DELAYED RELEASE ORAL
Status: DISCONTINUED | OUTPATIENT
Start: 2022-05-17 | End: 2022-05-17 | Stop reason: HOSPADM

## 2022-05-16 RX ORDER — DOCUSATE SODIUM 100 MG/1
100 CAPSULE, LIQUID FILLED ORAL 2 TIMES DAILY
Status: DISCONTINUED | OUTPATIENT
Start: 2022-05-16 | End: 2022-05-17 | Stop reason: HOSPADM

## 2022-05-16 RX ORDER — ALBUTEROL SULFATE 2.5 MG/3ML
2.5 SOLUTION RESPIRATORY (INHALATION) EVERY 6 HOURS PRN
Status: DISCONTINUED | OUTPATIENT
Start: 2022-05-16 | End: 2022-05-17 | Stop reason: HOSPADM

## 2022-05-16 RX ORDER — LISINOPRIL 10 MG/1
10 TABLET ORAL DAILY
COMMUNITY
Start: 2021-12-14

## 2022-05-16 RX ORDER — ONDANSETRON 2 MG/ML
4 INJECTION INTRAMUSCULAR; INTRAVENOUS EVERY 6 HOURS PRN
Status: DISCONTINUED | OUTPATIENT
Start: 2022-05-16 | End: 2022-05-17 | Stop reason: HOSPADM

## 2022-05-16 RX ORDER — DICYCLOMINE HYDROCHLORIDE 10 MG/1
10 CAPSULE ORAL 2 TIMES DAILY
Status: DISCONTINUED | OUTPATIENT
Start: 2022-05-16 | End: 2022-05-17 | Stop reason: HOSPADM

## 2022-05-16 RX ORDER — ASPIRIN 81 MG/1
81 TABLET, CHEWABLE ORAL DAILY
Status: ON HOLD | COMMUNITY
End: 2022-05-16 | Stop reason: CLARIF

## 2022-05-16 RX ORDER — ATORVASTATIN CALCIUM 40 MG/1
40 TABLET, FILM COATED ORAL DAILY
COMMUNITY

## 2022-05-16 RX ORDER — DICYCLOMINE HYDROCHLORIDE 10 MG/1
10 CAPSULE ORAL 3 TIMES DAILY
COMMUNITY
Start: 2022-01-12

## 2022-05-16 RX ORDER — OMEPRAZOLE 20 MG/1
20 CAPSULE, DELAYED RELEASE ORAL DAILY
COMMUNITY

## 2022-05-16 RX ORDER — FERROUS SULFATE 325(65) MG
1 TABLET ORAL
Status: ON HOLD | COMMUNITY
Start: 2022-02-16 | End: 2022-05-16 | Stop reason: CLARIF

## 2022-05-16 RX ORDER — ATORVASTATIN CALCIUM 40 MG/1
40 TABLET, FILM COATED ORAL DAILY
Status: DISCONTINUED | OUTPATIENT
Start: 2022-05-16 | End: 2022-05-17 | Stop reason: HOSPADM

## 2022-05-16 RX ADMIN — MORPHINE SULFATE 2 MG: 2 INJECTION, SOLUTION INTRAMUSCULAR; INTRAVENOUS at 19:44

## 2022-05-16 RX ADMIN — ATORVASTATIN CALCIUM 40 MG: 40 TABLET, FILM COATED ORAL at 16:54

## 2022-05-16 RX ADMIN — KETOROLAC TROMETHAMINE 15 MG: 30 INJECTION, SOLUTION INTRAMUSCULAR at 10:25

## 2022-05-16 RX ADMIN — SODIUM CHLORIDE 1000 ML: 0.9 INJECTION, SOLUTION INTRAVENOUS at 05:57

## 2022-05-16 RX ADMIN — METOPROLOL TARTRATE 25 MG: 25 TABLET, FILM COATED ORAL at 22:46

## 2022-05-16 RX ADMIN — SODIUM CHLORIDE, SODIUM LACTATE, POTASSIUM CHLORIDE, AND CALCIUM CHLORIDE 125 ML/HR: .6; .31; .03; .02 INJECTION, SOLUTION INTRAVENOUS at 12:58

## 2022-05-16 RX ADMIN — DOCUSATE SODIUM 100 MG: 100 CAPSULE ORAL at 15:01

## 2022-05-16 RX ADMIN — DICYCLOMINE HYDROCHLORIDE 10 MG: 10 CAPSULE ORAL at 22:45

## 2022-05-16 RX ADMIN — HEPARIN SODIUM 5000 UNITS: 5000 INJECTION INTRAVENOUS; SUBCUTANEOUS at 15:01

## 2022-05-16 RX ADMIN — HEPARIN SODIUM 5000 UNITS: 5000 INJECTION INTRAVENOUS; SUBCUTANEOUS at 22:46

## 2022-05-16 NOTE — CONSULTS
[address]  [facility] 0/4/[birth year], 62 y o  male MRN: [id number]  Unit/Bed#: -01 Encounter: [id number]  Primary Care Provider: Lucian Schroeder MD   Date and time admitted to hospital: 5/16/2022  4:52 AM    Inpatient consult to Internal Medicine  Consult performed by: Woody Baron MD  Consult ordered by: Miriam West PA-C          * Left inguinal hernia  Assessment & Plan  Patient presented with episodic abdominal pain  CT done recently did show L inguinal hernia    Patient hospitalized under the surgical service  Plan for left inguinal hernia repair tomorrow tentatively  Patient appears to be at baseline status physically and respiratory denies angina or dyspnea on exertion  Revised cardiac risk index is 1    Coronary artery disease  Assessment & Plan  Denies chest pain currently or previously  Continue metoprolol  Not on aspirin as per his outpatient cardiologist allegedly  Also mentions history of "ablation" presumably for atrial fibrillation  Continue statin    Iron deficiency anemia  Assessment & Plan  Hemoglobin currently at 9 6 this appears to be close to his baseline  Patient will benefit from iron supplementation    Liver cancer [facility])  Assessment & Plan  Mentions history of liver cancer allegedly treated with radiation in the past   CMP currently appears unremarkable    Primary hypertension  Assessment & Plan  Chronically on lisinopril, metoprolol  Continue metoprolol for the time being  Hold lisinopril for now and ensure kidney functions appropriately stable prior to recontinuation after surgery      COPD (chronic obstructive pulmonary disease) (HCC)  Assessment & Plan  Appears to be at baseline, patient does have chronic respiratory failure with hypoxia due to this, chronically on 2 L of oxygen  Continue albuterol as needed  There is a chest x-ray that has been ordered by primary team, follow-up      VTE Prophylaxis: Sequential compression device Daisy Alfaro)   / sequential compression device     Recommendations for Discharge: As per surgical team    Counseling / Coordination of Care Time: 45 minutes  Greater than 50% of total time spent on patient counseling and coordination of care  Collaboration of Care: Were Recommendations Directly Discussed with Primary Treatment Team? - Yes     History of Present Illness:    Lora Collier is a 62 y o  male who is originally admitted to the surgical service due to abdominal pain, inguinal hernia  We are consulted for medical management  Patient does mention that he does have pain in his left inguinal area on occasion  This has been occurring more often which has prompted him to visit emergency department IV different occasions  He otherwise denies any constipation, nausea vomiting  Denies any fever  Patient was hospitalized under the surgical service with plan for left inguinal hernia repair  On review of his medical records There is reported history of coronary artery disease, atrial fibrillation status post ablation, questionable liver cancer, hypertension, hyperlipidemia, iron-deficiency anemia, emphysema  Appears to be chronically on oxygen 2 L  He currently denies any chest pain nausea vomiting diarrhea constipation  Review of Systems:    Review of Systems   Gastrointestinal: Positive for abdominal pain  All other systems reviewed and are negative        Past Medical and Surgical History:     Past Medical History:   Diagnosis Date    [facility]     Asthma     Cancer [facility] )     liver    Colon polyp     Coronary artery disease     [facility]     Hyperlipidemia     Hypertension     Liver disease     Shortness of breath        Past Surgical History:   Procedure Laterality Date    BACK SURGERY      CHOLECYSTECTOMY      COLONOSCOPY      CORONARY ANGIOPLASTY WITH STENT PLACEMENT      HERNIA REPAIR      KNEE ARTHROSCOPY      TONSILLECTOMY         Meds/Allergies:    all medications and allergies reviewed    Allergies: Allergies   Allergen Reactions    Iodine - Food Allergy Anaphylaxis    Penicillins Anaphylaxis       Social History:     Marital Status: Single    Substance Use History:   Social History     Substance and Sexual Activity   Alcohol Use Never     Social History     Tobacco Use   Smoking Status Never Smoker   Smokeless Tobacco Never Used     Social History     Substance and Sexual Activity   Drug Use Never       Family History:    non-contributory    Physical Exam:     Vitals:   Blood Pressure: 139/65 (05/16/22 1501)  Pulse: 73 (05/16/22 1501)  Temperature: 97 9 °F (36 6 °C) (05/16/22 1501)  Temp Source: Oral (05/16/22 1501)  Respirations: 18 (05/16/22 1501)  Height: 5' 7 01" (170 2 cm) (05/16/22 1235)  Weight - Scale: 79 3 kg (174 lb 13 2 oz) (05/16/22 1235)  SpO2: 94 % (05/16/22 1501)    Physical Exam  Vitals and nursing note reviewed  Constitutional:       Appearance: Normal appearance  Comments: Male patient in bed, awake   HENT:      Head: Normocephalic and atraumatic  Right Ear: External ear normal       Left Ear: External ear normal       Nose: Nose normal  No congestion or rhinorrhea  Mouth/Throat:      Mouth: Mucous membranes are moist       Pharynx: Oropharynx is clear  No oropharyngeal exudate or posterior oropharyngeal erythema  Eyes:      General: No scleral icterus  Right eye: No discharge  Left eye: No discharge  Pupils: Pupils are equal, round, and reactive to light  Neck:      Vascular: No carotid bruit  Cardiovascular:      Rate and Rhythm: Normal rate and regular rhythm  Pulses: Normal pulses  Heart sounds: No murmur heard  No friction rub  No gallop  Pulmonary:      Effort: Pulmonary effort is normal  No respiratory distress  Breath sounds: Normal breath sounds  No stridor  No wheezing, rhonchi or rales  Comments: On oxygen  Abdominal:      General: Abdomen is flat   Bowel sounds are normal  There is no distension  Palpations: Abdomen is soft  There is no mass  Tenderness: There is no abdominal tenderness  There is no guarding or rebound  Hernia: No hernia is present  Musculoskeletal:         General: No swelling, tenderness, deformity or signs of injury  Normal range of motion  Cervical back: Normal range of motion  No rigidity  No muscular tenderness  Lymphadenopathy:      Cervical: No cervical adenopathy  Skin:     General: Skin is warm and dry  Capillary Refill: Capillary refill takes less than 2 seconds  Coloration: Skin is not jaundiced or pale  Findings: No bruising or erythema  Neurological:      General: No focal deficit present  Mental Status: He is alert and oriented to person, place, and time  Mental status is at baseline  Cranial Nerves: No cranial nerve deficit  Sensory: No sensory deficit  Motor: No weakness  Coordination: Coordination normal       Deep Tendon Reflexes: Reflexes normal    Psychiatric:         Mood and Affect: Mood normal          Behavior: Behavior normal          Thought Content: Thought content normal          Judgment: Judgment normal            Additional Data:     Lab Results: I have personally reviewed pertinent reports        Results from last 7 days   Lab Units 05/16/22  0501   WBC Thousand/uL 12 48*   HEMOGLOBIN g/dL 9 6*   HEMATOCRIT % 30 6*   PLATELETS Thousands/uL 293   NEUTROS PCT % 85*   LYMPHS PCT % 8*   MONOS PCT % 5   EOS PCT % 1     Results from last 7 days   Lab Units 05/16/22  0501   SODIUM mmol/L 138   POTASSIUM mmol/L 3 7   CHLORIDE mmol/L 104   CO2 mmol/L 27   BUN mg/dL 23   CREATININE mg/dL 1 09   ANION GAP mmol/L 7   CALCIUM mg/dL 8 5   ALBUMIN g/dL 3 6   TOTAL BILIRUBIN mg/dL 0 82   ALK PHOS U/L 78   ALT U/L 22   AST U/L 18   GLUCOSE RANDOM mg/dL 184*     Results from last 7 days   Lab Units 05/16/22  0556   INR  1 07         Lab Results   Component Value Date/Time HGBA1C 5 5 06/24/2020 04:20 PM         Results from last 7 days   Lab Units 05/13/22  0945   LACTIC ACID mmol/L 1 3       Imaging: I have personally reviewed pertinent reports  CT abdomen pelvis without contrast   Final Result by Ana M Sandoval MD (05/16 4815)      Compared to 3 days ago, left inguinal hernia no longer contains bowel  No acute findings  Nonemergent findings above  Workstation performed: [id number]         XR chest pa & lateral    (Results Pending)         ** Please Note: This note has been constructed using a voice recognition system   **

## 2022-05-16 NOTE — CASE MANAGEMENT
Case Management Discharge Planning Note    Patient name Kristopher Levin  Location [address] [id number] MRN [id number]  : 1960 Date 2022       Current Admission Date: 2022  Current Admission Diagnosis:Left inguinal hernia   Patient Active Problem List    Diagnosis Date Noted    Left inguinal hernia 2022    [facility] 20[facility]    Abnormal CT scan 2021    Diverticulitis of large intestine without perforation or abscess without bleeding 2021    Pulmonary emphysema [facility] ) 2021    Primary hypertension 2021      LOS (days): 0  Geometric Mean LOS (GMLOS) (days):   Days to GMLOS:     OBJECTIVE:            Current admission status: Observation   Preferred Pharmacy:   [facility] [address], [address]  Phone: [phone] Fax: [phone]    Primary Care Provider: Georges Jerez MD    Primary Insurance: Miguel Torres MADHU  Secondary Insurance:     DISCHARGE DETAILS:    Additional Comments: RN informed CM that pt would like a letter indicating his hospitalization faxed to F: [phone]  CM met with pt at bedside who informed that a letter noting his admission to the hospital would need to be sent to his , Luisito Silver (P: [phone])  KATHLEEN called and spoke with Mary Gross to confirm fax number and faxed letter as requested  She requested a call when there is a projected discharge date

## 2022-05-16 NOTE — H&P
GENERAL SURGERY HISTORY AND PHYSICAL      Hailee Looney 62 y o  male MRN: [id number]  Unit/Bed#: ED 28 Encounter: [id number]      Assessment/Plan   Left Inguinal Hernia with intermittent incarceration  Recurrence yesterday with severe pain, vomiting, sweating  and unable to get off bathroom floor  Leukocytosis     H/O:   CAD h/o Mis x 2  Afib Ablation not on anticoagulation  Schatzki's  Ring EGD with dilation 2/22    Questionable Liver Cancer  Per Oncology record there is no record liver cancer or pathology  Pt claimed he received 10 radiation treatments per week for liver cancer while incarcerated in [facility]  HTN  HLD,  Fe Def Anemia  UR  Emphysema      -admit for IVF  -NPO  -plan for OR tomorrow for repair of LIHR Laparoscopically   -cardiac clearance  -SLIM for medical management  DVT PPX   -ICE  -pain control        Chief Complaint I have this hernia on the left  It came out while on the toilet yesterday and it was extremely painful that it made me vomit  HPI: Hailee Looney is a 62y o  year old male  PHM: h/o Right IHR with mesh at [facility]   CAD MI x 2 A-Fib Ablation not on AC, HTN, HLD, Lung disease  Schatzki' ring EGD, dilation 2/22, states he is 'being looked at for liver cancer"  Per oncology note 4/22 he claims he had liver CA in 2017 and received radiation treatments 10 x per week while incarcerated in [facility] but there is no documentation or pathology to support that claim  He was to get records from the correction system  Surgical history: cholecystectomy, RIHR, Back Surgery, Coronary Angioplasty with stent placement   who presents with recurrence of left inguinal hernia  Pt states he has had this hernia in the right for months  He states it will pop out and it sometimes gets hard but with relaxation he is able to massage it back in  He was to see the surgeon who did his right inguinal hernia but did not follow up at [facility]      He was in [facility] 3 days ago for [facility] which reduced easily and he was discharge to have outpatient cardiac and PCP clearance  The pain returned yesterday when he sat on the toilet for a bowel movement  It was excruciating, causing him to cry, sweat and needed to lay on the bathroom floor  He was vomiting  He has difficulty void due to pain  He states he was able to massage it back in but it is still very painful  He is no longer vomiting        Imaging Studies: CT abdomen pelvis without contrast    Result Date: 5/16/2022  Impression: Compared to 3 days ago, left inguinal hernia no longer contains bowel  No acute findings  Nonemergent findings above   Workstation performed: [id number]     Lab Results:   Coags:   Lab Results   Component Value Date    PTT 31 05/16/2022    INR 1 07 05/16/2022   , CBC with diff:   Lab Results   Component Value Date    WBC 12 48 (H) 05/16/2022    HGB 9 6 (L) 05/16/2022    HCT 30 6 (L) 05/16/2022    MCV 60 (L) 05/16/2022     05/16/2022    MCH 18 8 (L) 05/16/2022    MCHC 31 4 05/16/2022    RDW 17 5 (H) 05/16/2022    MPV 9 6 05/16/2022    NRBC 0 05/16/2022   , BMP/CMP:   Lab Results   Component Value Date    SODIUM 138 05/16/2022    K 3 7 05/16/2022     05/16/2022    CO2 27 05/16/2022    BUN 23 05/16/2022    CREATININE 1 09 05/16/2022    CALCIUM 8 5 05/16/2022    AST 18 05/16/2022    ALT 22 05/16/2022    ALKPHOS 78 05/16/2022    EGFR 73 05/16/2022         Historical Information   Past Medical History:   Diagnosis Date    [facility]     Asthma     Cancer [facility]     liver    Colon polyp     Coronary artery disease     Heart attack ([facility]     Hyperlipidemia     Hypertension     Liver disease     Shortness of breath      Past Surgical History:   Procedure Laterality Date    BACK SURGERY      CHOLECYSTECTOMY      COLONOSCOPY      CORONARY ANGIOPLASTY WITH STENT PLACEMENT      HERNIA REPAIR      KNEE ARTHROSCOPY      TONSILLECTOMY       Social History   Social History     Substance and Sexual Activity   Alcohol Use Never     Social History     Substance and Sexual Activity   Drug Use Never     Social History     Tobacco Use   Smoking Status Never Smoker   Smokeless Tobacco Never Used     Family History: no pertinent family history  Allergies   Allergen Reactions    Iodine - Food Allergy Anaphylaxis    Penicillins Anaphylaxis     Meds/Allergies     current meds:   No current facility-administered medications for this encounter  See home med list  These will be reviewed and ordered appropriately  Objective   Vitals: ,There is no height or weight on file to calculate BMI  Intake/Output Summary (Last 24 hours) at 5/16/2022 1023  Last data filed at 5/16/2022 0657  Gross per 24 hour   Intake 1000 ml   Output --   Net 1000 ml     @LDASHORT    @ROS:  12 set ROS reviewed and negative except for:   Pain in left hernia in my groin  I was vomiting  It made me sweat  This has been going on for a few months  Physical Exam:Blood pressure 129/65, pulse 76, temperature 97 8 °F (36 6 °C), temperature source Oral, resp  rate 19, SpO2 97 %  General appearance: alert, appears stated age and cooperative  HEENT: PERRLA, EOMI, sclera clear, anicterus  Back: no tenderness,deformity,   Lungs:clear throughout  Heart[de-identified] RRR, S1, S2 normal, no murmur  Abdomen: soft, NBS  ,   Left groin:  Soft tissue edema of the josé, left testicle is tender, hernia is now reduced however there is significant tenderness of the testicle and groin  RIHR intact, no hernia present  Extremities: FROM no joint deformities, motor,sensory intact, pedal edema: none   Skin: no rashes or lesion       Neurologic: CN II-XII grossly intact, no tremor, affect appropriate        VTE Prophylaxis: Sequential compression device (Venodyne)  and Heparin     Code Status: Prior  Advance Directive and Living Will:      Power of :    POLST:      Stormy Gu PA-C  5/16/2022

## 2022-05-16 NOTE — RESPIRATORY THERAPY NOTE
RT Protocol Note  Don Abbott 62 y o  male MRN: [id number]  Unit/Bed#: -01 Encounter: [id number]    Assessment    Principal Problem:    Left inguinal hernia  Active Problems:    COPD (chronic obstructive pulmonary disease) (HCC)    Primary hypertension    Liver cancer (HCC)    Coronary artery disease    Iron deficiency anemia      Home Pulmonary Medications:  Prn albuterol       Past Medical History:   Diagnosis Date    [facility]     Asthma     Cancer [facility]     liver    Colon polyp     Coronary artery disease     Heart attack [facility]     Hyperlipidemia     Hypertension     Liver disease     Shortness of breath      Social History     Socioeconomic History    Marital status: Single     Spouse name: None    Number of children: None    Years of education: None    Highest education level: None   Occupational History    None   Tobacco Use    Smoking status: Never Smoker    Smokeless tobacco: Never Used   Vaping Use    Vaping Use: Never used   Substance and Sexual Activity    Alcohol use: Never    Drug use: Never    Sexual activity: Not Currently   Other Topics Concern    None   Social History Narrative    None     Social Determinants of Health     Financial Resource Strain: Not on file   Food Insecurity: Not on file   Transportation Needs: Not on file   Physical Activity: Not on file   Stress: Not on file   Social Connections: Not on file   Intimate Partner Violence: Not on file   Housing Stability: Not on file       Subjective         Objective    Physical Exam:        Vitals:  Blood pressure 139/65, pulse 73, temperature 97 9 °F (36 6 °C), temperature source Oral, resp  rate 18, height 5' 7 01" (1 702 m), weight 79 3 kg (174 lb 13 2 oz), SpO2 94 %  Imaging and other studies: I have personally reviewed pertinent reports              Plan    Respiratory Plan: Home Bronchodilator Patient pathway        Resp Comments: Pt with hx of emphysema, no recent episodes, uses albuterol prn @ home    will continue with this order

## 2022-05-16 NOTE — ED CARE HANDOFF
Emergency Department Sign Out Note        Sign out and transfer of care from [facility]  See Separate Emergency Department note  The patient, Bucky Braswell, was evaluated by the previous provider for left groin pain and palpitations      Workup Completed:    Results Reviewed     Procedure Component Value Units Date/Time    HS Troponin I 2hr [[id number]]  (Normal) Collected: 05/16/22 0807    Lab Status: Final result Specimen: Blood from Hand, Right Updated: 05/16/22 0845     hs TnI 2hr 4 ng/L      Delta 2hr hsTnI 0 ng/L     HS Troponin 0hr (reflex protocol) [[id number]]  (Normal) Collected: 05/16/22 0556    Lab Status: Final result Specimen: Blood from Arm, Right Updated: 05/16/22 0636     hs TnI 0hr 4 ng/L     Protime-INR [[id number]]  (Normal) Collected: 05/16/22 0556    Lab Status: Final result Specimen: Blood from Arm, Right Updated: 05/16/22 0622     Protime 13 5 seconds      INR 1 07    APTT [[id number]]  (Normal) Collected: 05/16/22 0556    Lab Status: Final result Specimen: Blood from Arm, Right Updated: 05/16/22 0622     PTT 31 seconds     Comprehensive metabolic panel [[id number]]  (Abnormal) Collected: 05/16/22 0501    Lab Status: Final result Specimen: Blood from Arm, Right Updated: 05/16/22 0528     Sodium 138 mmol/L      Potassium 3 7 mmol/L      Chloride 104 mmol/L      CO2 27 mmol/L      ANION GAP 7 mmol/L      BUN 23 mg/dL      Creatinine 1 09 mg/dL      Glucose 184 mg/dL      Calcium 8 5 mg/dL      AST 18 U/L      ALT 22 U/L      Alkaline Phosphatase 78 U/L      Total Protein 7 0 g/dL      Albumin 3 6 g/dL      Total Bilirubin 0 82 mg/dL      eGFR 73 ml/min/1 73sq m     Narrative:      Zach guidelines for Chronic Kidney Disease (CKD):     Stage 1 with normal or high GFR (GFR > 90 mL/min/1 73 square meters)    Stage 2 Mild CKD (GFR = 60-89 mL/min/1 73 square meters)    Stage 3A Moderate CKD (GFR = 45-59 mL/min/1 73 square meters)    Stage 3B Moderate CKD (GFR = 30-44 mL/min/1 73 square meters)    Stage 4 Severe CKD (GFR = 15-29 mL/min/1 73 square meters)    Stage 5 End Stage CKD (GFR <15 mL/min/1 73 square meters)  Note: GFR calculation is accurate only with a steady state creatinine    Lipase [[id number]]  (Abnormal) Collected: 05/16/22 0501    Lab Status: Final result Specimen: Blood from Arm, Right Updated: 05/16/22 0528     Lipase 33 u/L     CBC and differential [[id number]]  (Abnormal) Collected: 05/16/22 0501    Lab Status: Final result Specimen: Blood from Arm, Right Updated: 05/16/22 0512     WBC 12 48 Thousand/uL      RBC 5 10 Million/uL      Hemoglobin 9 6 g/dL      Hematocrit 30 6 %      MCV 60 fL      MCH 18 8 pg      MCHC 31 4 g/dL      RDW 17 5 %      MPV 9 6 fL      Platelets 383 Thousands/uL      nRBC 0 /100 WBCs      Neutrophils Relative 85 %      Immat GRANS % 1 %      Lymphocytes Relative 8 %      Monocytes Relative 5 %      Eosinophils Relative 1 %      Basophils Relative 0 %      Neutrophils Absolute 10 59 Thousands/µL      Immature Grans Absolute 0 06 Thousand/uL      Lymphocytes Absolute 0 99 Thousands/µL      Monocytes Absolute 0 67 Thousand/µL      Eosinophils Absolute 0 14 Thousand/µL      Basophils Absolute 0 03 Thousands/µL           CT abdomen pelvis without contrast    Result Date: 5/16/2022  Impression: Compared to 3 days ago, left inguinal hernia no longer contains bowel  No acute findings  Nonemergent findings above  Workstation performed: [id number]       ED Course / Workup Pending (followup):  Delta troponin/EKG              ED Course as of 05/16/22 1000   Mon May 16, 2022   5837 Patient complaining of significant pain in his left groin region  Dr Elizabeth Mcrae notified of patient and surgery team will be down to evaluate patient at bedside  [id number] Patient evaluated by Radha Bardales, surgery AP  Surgery recommending admission to [facility]            Procedures  MDM  Number of Diagnoses or Management Options  Left inguinal hernia  Lower abdominal pain  Palpitations  Diagnosis management comments: Patient presenting for left groin pain  Seen in the ED 3 days ago for inguinal hernia and hernia able to be reduced so he was ultimately discharged  C/o hernia popping out multiple times since then and hernia gets incarcerated intermittently  Patient c/o persistent pain in the ED so case was discussed with surgery team  Surgery team to admit patient and perform hernia repair tentatively tomorrow  Disposition  Final diagnoses:   Lower abdominal pain   Palpitations   Left inguinal hernia     Time reflects when diagnosis was documented in both MDM as applicable and the Disposition within this note     Time User Action Codes Description Comment    5/16/2022  6:32 AM [place] Jennifer Add [R10 30] Lower abdominal pain     5/16/2022  6:32 AM [place] Jennifer Add [R00 2] Palpitations     5/16/2022  9:56 AM Gladys Calzada Add [K40 90] Left inguinal hernia       ED Disposition     None      Follow-up Information     Follow up With Specialties Details Why Contact Info Additional Information    [facility] Emergency Department Emergency Medicine Go to  If symptoms worsen [address] Emergency Department, [address] [place], [place], [address]    Azucena Melo MD  Schedule an appointment as soon as possible for a visit in 1 week  [facility] [address] [facility] General Surgery Schedule an appointment as soon as possible for a visit in 1 week Follow-up for hernia  [facility]  [address], [facility], [place], [address] [phone]        Patient's Medications   Discharge Prescriptions    No medications on file     No discharge procedures on file         ED Provider  Electronically Signed by     Katerina Berry PA-C  05/16/22 6512

## 2022-05-16 NOTE — LETTER
2022    To whom it may concern,    Higinio Medina,  [age], was discharged from [address] [place] [address] on 2022      Hans Paris, [address] [place] [address]

## 2022-05-16 NOTE — DISCHARGE INSTRUCTIONS
PATIENT INSTRUCTIONS  HERNIA    FOLLOW-UP:  Please make an appointment with your physician in 1 week(s)  Call your physician immediately if you have any fevers greater than 102 5, drainage from you wound that is not clear or looks infected, persistent bleeding, increasing abdominal pain, problems urinating, or persistent nausea/vomiting  WOUND CARE INSTRUCTIONS:  Keep a dry clean dressing on the wound if there is drainage  The initial bandage may be removed after 48 hours  Once the wound has quit draining you may leave it open to air  If clothing rubs against the wound or causes irritation and the wound is not draining you may cover it with a dry dressing during the daytime  Try to keep the wound dry and avoid ointments on the wound unless directed to do so  If the wound becomes bright red and painful or starts to drain infected material that is not clear, please contact your physician immediately  If the wound is mildly pink and has a thick firm ridge underneath it, this is normal, and is referred to as a healing ridge  This will resolve over the next 4-6 weeks  DIET:  You may eat any foods that you can tolerate  It is a good idea to eat a high fiber diet and take in plenty of fluids to prevent constipation  If you do become constipated you may want to take a mild laxative or take ducolax tablets on a daily basis until your bowel habits are regular  Constipation can be very uncomfortable, along with straining, after recent abdominal surgery  ACTIVITY:  You are encouraged to cough and deep breath or use your incentive spirometer if you were given one, every 15-30 minutes when awake  This will help prevent respiratory complications and low grade fevers post-operatively  You may want to hug a pillow when coughing and sneezing to add additional support to the surgical area which will decrease pain during these times  You are encouraged to walk and engage in light activity for the next two weeks  You should not lift more than 20 pounds during this time frame as it could put you at increased risk for a hernia recurrence  Twenty pounds is roughly equivalent to a plastic bag of groceries  MEDICATIONS:  Try to take narcotic medications and anti-inflammatory medications, such as tylenol, ibuprofen, naprosyn, etc , with food  This will minimize stomach upset from the medication  Should you develop nausea and vomiting from the pain medication, or develop a rash, please discontinue the medication and contact your physician  You should not drive, make important decisions, or operate machinery when taking narcotic pain medication  QUESTIONS:  Please feel free to call your physician or the hospital  if you have any questions, and they will be glad to assist you

## 2022-05-16 NOTE — LETTER
2022    To whom it may concern,    Nahid Ewa,  [age], was admitted to [address] [place] [address] on 2022 and is currently under observation with no projected discharge date at this time      Martina Fatima, [address] [place] [address]

## 2022-05-16 NOTE — PLAN OF CARE
Problem: PAIN - ADULT  Goal: Verbalizes/displays adequate comfort level or baseline comfort level  Description: Interventions:  - Encourage patient to monitor pain and request assistance  - Assess pain using appropriate pain scale  - Administer analgesics based on type and severity of pain and evaluate response  - Implement non-pharmacological measures as appropriate and evaluate response  - Consider cultural and social influences on pain and pain management  - Notify physician/advanced practitioner if interventions unsuccessful or patient reports new pain  Outcome: Progressing     Problem: INFECTION - ADULT  Goal: Absence or prevention of progression during hospitalization  Description: INTERVENTIONS:  - Assess and monitor for signs and symptoms of infection  - Monitor lab/diagnostic results  - Monitor all insertion sites, i e  indwelling lines, tubes, and drains  - Monitor endotracheal if appropriate and nasal secretions for changes in amount and color  - [employer] appropriate cooling/warming therapies per order  - Administer medications as ordered  - Instruct and encourage patient and family to use good hand hygiene technique  - Identify and instruct in appropriate isolation precautions for identified infection/condition  Outcome: Progressing  Goal: Absence of fever/infection during neutropenic period  Description: INTERVENTIONS:  - Monitor WBC    Outcome: Progressing     Problem: Knowledge Deficit  Goal: Patient/family/caregiver demonstrates understanding of disease process, treatment plan, medications, and discharge instructions  Description: Complete learning assessment and assess knowledge base    Interventions:  - Provide teaching at level of understanding  - Provide teaching via preferred learning methods  Outcome: Progressing     Problem: Nutrition/Hydration-ADULT  Goal: Nutrient/Hydration intake appropriate for improving, restoring or maintaining nutritional needs  Description: Monitor and assess patient's nutrition/hydration status for malnutrition  Collaborate with interdisciplinary team and initiate plan and interventions as ordered  Monitor patient's weight and dietary intake as ordered or per policy  Utilize nutrition screening tool and intervene as necessary  Determine patient's food preferences and provide high-protein, high-caloric foods as appropriate       INTERVENTIONS:  - Monitor oral intake, urinary output, labs, and treatment plans  - Assess nutrition and hydration status and recommend course of action  - Evaluate amount of meals eaten  - Assist patient with eating if necessary   - Allow adequate time for meals  - Recommend/ encourage appropriate diets, oral nutritional supplements, and vitamin/mineral supplements  - Order, calculate, and assess calorie counts as needed  - Recommend, monitor, and adjust tube feedings and TPN/PPN based on assessed needs  - Assess need for intravenous fluids  - Provide specific nutrition/hydration education as appropriate  - Include patient/family/caregiver in decisions related to nutrition  Outcome: Progressing     Problem: DISCHARGE PLANNING  Goal: Discharge to home or other facility with appropriate resources  Description: INTERVENTIONS:  - Identify barriers to discharge w/patient and caregiver  - Arrange for needed discharge resources and transportation as appropriate  - Identify discharge learning needs (meds, wound care, etc )  - Arrange for interpretive services to assist at discharge as needed  - Refer to Case Management Department for coordinating discharge planning if the patient needs post-hospital services based on physician/advanced practitioner order or complex needs related to functional status, cognitive ability, or social support system  Outcome: Progressing

## 2022-05-17 ENCOUNTER — ANESTHESIA EVENT (OUTPATIENT)
Dept: PERIOP | Facility: HOSPITAL | Age: 62
End: 2022-05-17
Payer: COMMERCIAL

## 2022-05-17 ENCOUNTER — ANESTHESIA (OUTPATIENT)
Dept: PERIOP | Facility: HOSPITAL | Age: 62
End: 2022-05-17
Payer: COMMERCIAL

## 2022-05-17 VITALS
WEIGHT: 174.82 LBS | TEMPERATURE: 97.8 F | HEART RATE: 93 BPM | BODY MASS INDEX: 27.44 KG/M2 | RESPIRATION RATE: 17 BRPM | OXYGEN SATURATION: 93 % | DIASTOLIC BLOOD PRESSURE: 80 MMHG | SYSTOLIC BLOOD PRESSURE: 125 MMHG | HEIGHT: 67 IN

## 2022-05-17 PROBLEM — K40.90 LEFT INGUINAL HERNIA: Status: RESOLVED | Noted: 2022-05-16 | Resolved: 2022-05-17

## 2022-05-17 LAB
ANION GAP SERPL CALCULATED.3IONS-SCNC: 4 MMOL/L (ref 4–13)
BASOPHILS # BLD AUTO: 0.03 THOUSANDS/ΜL (ref 0–0.1)
BASOPHILS NFR BLD AUTO: 0 % (ref 0–1)
BUN SERPL-MCNC: 15 MG/DL (ref 5–25)
CALCIUM SERPL-MCNC: 8.7 MG/DL (ref 8.3–10.1)
CHLORIDE SERPL-SCNC: 105 MMOL/L (ref 100–108)
CO2 SERPL-SCNC: 29 MMOL/L (ref 21–32)
CREAT SERPL-MCNC: 0.86 MG/DL (ref 0.6–1.3)
EOSINOPHIL # BLD AUTO: 0.21 THOUSAND/ΜL (ref 0–0.61)
EOSINOPHIL NFR BLD AUTO: 2 % (ref 0–6)
ERYTHROCYTE [DISTWIDTH] IN BLOOD BY AUTOMATED COUNT: 17.5 % (ref 11.6–15.1)
GFR SERPL CREATININE-BSD FRML MDRD: 94 ML/MIN/1.73SQ M
GLUCOSE P FAST SERPL-MCNC: 106 MG/DL (ref 65–99)
GLUCOSE SERPL-MCNC: 106 MG/DL (ref 65–140)
HCT VFR BLD AUTO: 30.2 % (ref 36.5–49.3)
HGB BLD-MCNC: 9.3 G/DL (ref 12–17)
IMM GRANULOCYTES # BLD AUTO: 0.07 THOUSAND/UL (ref 0–0.2)
IMM GRANULOCYTES NFR BLD AUTO: 1 % (ref 0–2)
LYMPHOCYTES # BLD AUTO: 1.75 THOUSANDS/ΜL (ref 0.6–4.47)
LYMPHOCYTES NFR BLD AUTO: 15 % (ref 14–44)
MCH RBC QN AUTO: 18.6 PG (ref 26.8–34.3)
MCHC RBC AUTO-ENTMCNC: 30.8 G/DL (ref 31.4–37.4)
MCV RBC AUTO: 60 FL (ref 82–98)
MONOCYTES # BLD AUTO: 0.73 THOUSAND/ΜL (ref 0.17–1.22)
MONOCYTES NFR BLD AUTO: 6 % (ref 4–12)
NEUTROPHILS # BLD AUTO: 9.11 THOUSANDS/ΜL (ref 1.85–7.62)
NEUTS SEG NFR BLD AUTO: 76 % (ref 43–75)
NRBC BLD AUTO-RTO: 0 /100 WBCS
PLATELET # BLD AUTO: 275 THOUSANDS/UL (ref 149–390)
PMV BLD AUTO: 9.1 FL (ref 8.9–12.7)
POTASSIUM SERPL-SCNC: 4 MMOL/L (ref 3.5–5.3)
RBC # BLD AUTO: 5 MILLION/UL (ref 3.88–5.62)
SODIUM SERPL-SCNC: 138 MMOL/L (ref 136–145)
WBC # BLD AUTO: 11.9 THOUSAND/UL (ref 4.31–10.16)

## 2022-05-17 PROCEDURE — 49520 REREPAIR ING HERNIA REDUCE: CPT | Performed by: PHYSICIAN ASSISTANT

## 2022-05-17 PROCEDURE — 88302 TISSUE EXAM BY PATHOLOGIST: CPT | Performed by: PATHOLOGY

## 2022-05-17 PROCEDURE — 94760 N-INVAS EAR/PLS OXIMETRY 1: CPT

## 2022-05-17 PROCEDURE — C1781 MESH (IMPLANTABLE): HCPCS | Performed by: SURGERY

## 2022-05-17 PROCEDURE — 99245 OFF/OP CONSLTJ NEW/EST HI 55: CPT | Performed by: INTERNAL MEDICINE

## 2022-05-17 PROCEDURE — 80048 BASIC METABOLIC PNL TOTAL CA: CPT | Performed by: PHYSICIAN ASSISTANT

## 2022-05-17 PROCEDURE — 99284 EMERGENCY DEPT VISIT MOD MDM: CPT | Performed by: SURGERY

## 2022-05-17 PROCEDURE — 85025 COMPLETE CBC W/AUTO DIFF WBC: CPT | Performed by: PHYSICIAN ASSISTANT

## 2022-05-17 PROCEDURE — 94664 DEMO&/EVAL PT USE INHALER: CPT

## 2022-05-17 PROCEDURE — 99024 POSTOP FOLLOW-UP VISIT: CPT | Performed by: SURGERY

## 2022-05-17 PROCEDURE — 49520 REREPAIR ING HERNIA REDUCE: CPT | Performed by: SURGERY

## 2022-05-17 DEVICE — BARD SOFT MESH
Type: IMPLANTABLE DEVICE | Site: INGUINAL | Status: FUNCTIONAL
Brand: BARD SOFT MESH

## 2022-05-17 RX ORDER — PHENYLEPHRINE HYDROCHLORIDE 10 MG/ML
INJECTION INTRAVENOUS AS NEEDED
Status: DISCONTINUED | OUTPATIENT
Start: 2022-05-17 | End: 2022-05-17

## 2022-05-17 RX ORDER — DEXMEDETOMIDINE HYDROCHLORIDE 100 UG/ML
INJECTION, SOLUTION INTRAVENOUS AS NEEDED
Status: DISCONTINUED | OUTPATIENT
Start: 2022-05-17 | End: 2022-05-17

## 2022-05-17 RX ORDER — EPHEDRINE SULFATE 50 MG/ML
INJECTION INTRAVENOUS AS NEEDED
Status: DISCONTINUED | OUTPATIENT
Start: 2022-05-17 | End: 2022-05-17

## 2022-05-17 RX ORDER — PROPOFOL 10 MG/ML
INJECTION, EMULSION INTRAVENOUS AS NEEDED
Status: DISCONTINUED | OUTPATIENT
Start: 2022-05-17 | End: 2022-05-17

## 2022-05-17 RX ORDER — ONDANSETRON 2 MG/ML
INJECTION INTRAMUSCULAR; INTRAVENOUS AS NEEDED
Status: DISCONTINUED | OUTPATIENT
Start: 2022-05-17 | End: 2022-05-17

## 2022-05-17 RX ORDER — CLINDAMYCIN PHOSPHATE 900 MG/50ML
900 INJECTION INTRAVENOUS ONCE
Status: COMPLETED | OUTPATIENT
Start: 2022-05-17 | End: 2022-05-17

## 2022-05-17 RX ORDER — METOCLOPRAMIDE HYDROCHLORIDE 5 MG/ML
10 INJECTION INTRAMUSCULAR; INTRAVENOUS ONCE AS NEEDED
Status: DISCONTINUED | OUTPATIENT
Start: 2022-05-17 | End: 2022-05-17 | Stop reason: HOSPADM

## 2022-05-17 RX ORDER — NEOSTIGMINE METHYLSULFATE 1 MG/ML
INJECTION INTRAVENOUS AS NEEDED
Status: DISCONTINUED | OUTPATIENT
Start: 2022-05-17 | End: 2022-05-17

## 2022-05-17 RX ORDER — OXYCODONE HYDROCHLORIDE 5 MG/1
5 TABLET ORAL EVERY 4 HOURS PRN
Qty: 12 TABLET | Refills: 0 | Status: SHIPPED | OUTPATIENT
Start: 2022-05-17 | End: 2022-05-20

## 2022-05-17 RX ORDER — DIPHENHYDRAMINE HYDROCHLORIDE 50 MG/ML
12.5 INJECTION INTRAMUSCULAR; INTRAVENOUS ONCE AS NEEDED
Status: DISCONTINUED | OUTPATIENT
Start: 2022-05-17 | End: 2022-05-17 | Stop reason: HOSPADM

## 2022-05-17 RX ORDER — DOCUSATE SODIUM 100 MG/1
100 CAPSULE, LIQUID FILLED ORAL 2 TIMES DAILY
Qty: 14 CAPSULE | Refills: 0 | Status: SHIPPED | OUTPATIENT
Start: 2022-05-17 | End: 2022-05-24

## 2022-05-17 RX ORDER — MAGNESIUM HYDROXIDE 1200 MG/15ML
LIQUID ORAL AS NEEDED
Status: DISCONTINUED | OUTPATIENT
Start: 2022-05-17 | End: 2022-05-17 | Stop reason: HOSPADM

## 2022-05-17 RX ORDER — MIDAZOLAM HYDROCHLORIDE 2 MG/2ML
INJECTION, SOLUTION INTRAMUSCULAR; INTRAVENOUS AS NEEDED
Status: DISCONTINUED | OUTPATIENT
Start: 2022-05-17 | End: 2022-05-17

## 2022-05-17 RX ORDER — SUCCINYLCHOLINE/SOD CL,ISO/PF 100 MG/5ML
SYRINGE (ML) INTRAVENOUS AS NEEDED
Status: DISCONTINUED | OUTPATIENT
Start: 2022-05-17 | End: 2022-05-17

## 2022-05-17 RX ORDER — HYDROMORPHONE HCL/PF 1 MG/ML
0.2 SYRINGE (ML) INJECTION
Status: DISCONTINUED | OUTPATIENT
Start: 2022-05-17 | End: 2022-05-17 | Stop reason: HOSPADM

## 2022-05-17 RX ORDER — GLYCOPYRROLATE 0.2 MG/ML
INJECTION INTRAMUSCULAR; INTRAVENOUS AS NEEDED
Status: DISCONTINUED | OUTPATIENT
Start: 2022-05-17 | End: 2022-05-17

## 2022-05-17 RX ORDER — LIDOCAINE HYDROCHLORIDE 20 MG/ML
INJECTION, SOLUTION EPIDURAL; INFILTRATION; INTRACAUDAL; PERINEURAL AS NEEDED
Status: DISCONTINUED | OUTPATIENT
Start: 2022-05-17 | End: 2022-05-17

## 2022-05-17 RX ORDER — FENTANYL CITRATE 50 UG/ML
INJECTION, SOLUTION INTRAMUSCULAR; INTRAVENOUS AS NEEDED
Status: DISCONTINUED | OUTPATIENT
Start: 2022-05-17 | End: 2022-05-17

## 2022-05-17 RX ORDER — ROCURONIUM BROMIDE 10 MG/ML
INJECTION, SOLUTION INTRAVENOUS AS NEEDED
Status: DISCONTINUED | OUTPATIENT
Start: 2022-05-17 | End: 2022-05-17

## 2022-05-17 RX ADMIN — Medication 100 MG: at 10:52

## 2022-05-17 RX ADMIN — FENTANYL CITRATE 25 MCG: 50 INJECTION, SOLUTION INTRAMUSCULAR; INTRAVENOUS at 12:54

## 2022-05-17 RX ADMIN — ROCURONIUM BROMIDE 20 MG: 50 INJECTION, SOLUTION INTRAVENOUS at 11:33

## 2022-05-17 RX ADMIN — LIDOCAINE HYDROCHLORIDE 60 MG: 20 INJECTION, SOLUTION EPIDURAL; INFILTRATION; INTRACAUDAL at 12:45

## 2022-05-17 RX ADMIN — HYDROMORPHONE HYDROCHLORIDE 0.2 MG: 1 INJECTION, SOLUTION INTRAMUSCULAR; INTRAVENOUS; SUBCUTANEOUS at 13:32

## 2022-05-17 RX ADMIN — NEOSTIGMINE METHYLSULFATE 3 MG: 1 INJECTION INTRAVENOUS at 12:50

## 2022-05-17 RX ADMIN — HEPARIN SODIUM 5000 UNITS: 5000 INJECTION INTRAVENOUS; SUBCUTANEOUS at 06:48

## 2022-05-17 RX ADMIN — METOPROLOL TARTRATE 25 MG: 25 TABLET, FILM COATED ORAL at 09:01

## 2022-05-17 RX ADMIN — FENTANYL CITRATE 50 MCG: 50 INJECTION, SOLUTION INTRAMUSCULAR; INTRAVENOUS at 10:48

## 2022-05-17 RX ADMIN — DEXMEDETOMIDINE HCL 8 MCG: 100 INJECTION INTRAVENOUS at 12:12

## 2022-05-17 RX ADMIN — SODIUM CHLORIDE, SODIUM LACTATE, POTASSIUM CHLORIDE, AND CALCIUM CHLORIDE 125 ML/HR: .6; .31; .03; .02 INJECTION, SOLUTION INTRAVENOUS at 09:58

## 2022-05-17 RX ADMIN — HYDROMORPHONE HYDROCHLORIDE 0.2 MG: 1 INJECTION, SOLUTION INTRAMUSCULAR; INTRAVENOUS; SUBCUTANEOUS at 13:48

## 2022-05-17 RX ADMIN — SODIUM CHLORIDE, SODIUM LACTATE, POTASSIUM CHLORIDE, AND CALCIUM CHLORIDE: .6; .31; .03; .02 INJECTION, SOLUTION INTRAVENOUS at 11:40

## 2022-05-17 RX ADMIN — LIDOCAINE HYDROCHLORIDE 50 MG: 20 INJECTION, SOLUTION EPIDURAL; INFILTRATION; INTRACAUDAL at 10:50

## 2022-05-17 RX ADMIN — PROPOFOL 100 MG: 10 INJECTION, EMULSION INTRAVENOUS at 10:52

## 2022-05-17 RX ADMIN — ROCURONIUM BROMIDE 20 MG: 50 INJECTION, SOLUTION INTRAVENOUS at 11:09

## 2022-05-17 RX ADMIN — FENTANYL CITRATE 50 MCG: 50 INJECTION, SOLUTION INTRAMUSCULAR; INTRAVENOUS at 10:53

## 2022-05-17 RX ADMIN — PHENYLEPHRINE HYDROCHLORIDE 100 MCG: 10 INJECTION INTRAVENOUS at 12:38

## 2022-05-17 RX ADMIN — ONDANSETRON 4 MG: 2 INJECTION INTRAMUSCULAR; INTRAVENOUS at 12:41

## 2022-05-17 RX ADMIN — DEXMEDETOMIDINE HCL 4 MCG: 100 INJECTION INTRAVENOUS at 10:45

## 2022-05-17 RX ADMIN — PHENYLEPHRINE HYDROCHLORIDE 100 MCG: 10 INJECTION INTRAVENOUS at 12:27

## 2022-05-17 RX ADMIN — ROCURONIUM BROMIDE 50 MG: 50 INJECTION, SOLUTION INTRAVENOUS at 10:59

## 2022-05-17 RX ADMIN — MIDAZOLAM 1 MG: 1 INJECTION INTRAMUSCULAR; INTRAVENOUS at 10:41

## 2022-05-17 RX ADMIN — DEXMEDETOMIDINE HCL 8 MCG: 100 INJECTION INTRAVENOUS at 10:53

## 2022-05-17 RX ADMIN — FENTANYL CITRATE 25 MCG: 50 INJECTION, SOLUTION INTRAMUSCULAR; INTRAVENOUS at 12:59

## 2022-05-17 RX ADMIN — GLYCOPYRROLATE 0.4 MCG: 0.2 INJECTION, SOLUTION INTRAMUSCULAR; INTRAVENOUS at 12:50

## 2022-05-17 RX ADMIN — CLINDAMYCIN PHOSPHATE 900 MG: 900 INJECTION, SOLUTION INTRAVENOUS at 10:42

## 2022-05-17 RX ADMIN — EPHEDRINE SULFATE 10 MG: 50 INJECTION, SOLUTION INTRAVENOUS at 11:05

## 2022-05-17 NOTE — PLAN OF CARE
Problem: INFECTION - ADULT  Goal: Absence or prevention of progression during hospitalization  Description: INTERVENTIONS:  - Assess and monitor for signs and symptoms of infection  - Monitor lab/diagnostic results  - Monitor all insertion sites, i e  indwelling lines, tubes, and drains  - Monitor endotracheal if appropriate and nasal secretions for changes in amount and color  - [employer] appropriate cooling/warming therapies per order  - Administer medications as ordered  - Instruct and encourage patient and family to use good hand hygiene technique  - Identify and instruct in appropriate isolation precautions for identified infection/condition  Outcome: Progressing  Goal: Absence of fever/infection during neutropenic period  Description: INTERVENTIONS:  - Monitor WBC    Outcome: Progressing     Problem: DISCHARGE PLANNING  Goal: Discharge to home or other facility with appropriate resources  Description: INTERVENTIONS:  - Identify barriers to discharge w/patient and caregiver  - Arrange for needed discharge resources and transportation as appropriate  - Identify discharge learning needs (meds, wound care, etc )  - Arrange for interpretive services to assist at discharge as needed  - Refer to Case Management Department for coordinating discharge planning if the patient needs post-hospital services based on physician/advanced practitioner order or complex needs related to functional status, cognitive ability, or social support system  Outcome: Progressing

## 2022-05-17 NOTE — DISCHARGE SUMMARY
Discharge Summary - General Surgery   Lora Collier 62 y o  male MRN: [id number]  Unit/Bed#: -01 Encounter: [id number]    Admission Date: 5/16/2022     Discharge Date: 5/17/2022    Admitting Diagnosis: Palpitations [R00 2]  Wheezing [R06 2]  Abdominal pain [R10 9]  [place] [N91 2]  Left inguinal hernia [K40 90]  Lower abdominal pain [R10 30]  Pulmonary emphysema, unspecified emphysema type ([address] ) [J43 9]  Hypertension, unspecified type [I10]    Discharge Diagnosis: Left inguinal hernia    Attending and Service: Mayra Contreras MD    Consulting Physician(s): Internal medicine    Procedures Performed: L inguinal hernia repair - laparoscopic attempted converted to open    Imaging:  Procedure: CT abdomen pelvis without contrast    Result Date: 5/16/2022  Narrative: CT ABDOMEN AND PELVIS WITHOUT IV CONTRAST INDICATION:   Hernia; llq pain  Patient reports left lower quadrant abdominal pain and palpitations that woke him from sleep  History of cholecystectomy, hernia repair and liver cancer  COMPARISON:  5/13/2022 TECHNIQUE:  CT examination of the abdomen and pelvis was performed  Axial, sagittal, and coronal 2D reformatted images were created from the source data and submitted for interpretation  Radiation dose length product (DLP) for this visit:  656 mGy-cm   This examination, like all CT scans performed in the [facility], was performed utilizing techniques to minimize radiation dose exposure, including the use of iterative reconstruction and automated exposure control  IV Contrast:  Because of a critical nationwide intravenous contrast shortage, the study was performed without intravenous contrast  Enteric Contrast:  Enteric contrast was not administered  FINDINGS: ABDOMEN LOWER CHEST:  Mild dependent atelectasis and similar, mild cardiomegaly  LIVER/BILIARY TREE:  Similar large masses in both the right and left lobes  No acute change  GALLBLADDER:  Cholecystectomy   SPLEEN:  Within normal limits  PANCREAS:  Within normal limits  ADRENAL GLANDS:  Within normal limits  KIDNEYS/URETERS:  Small left peripelvic cyst   No acute change  STOMACH AND BOWEL:  Mild diverticulosis  Mild residual oral contrast in the colon  Left colon no longer protrudes into a left inguinal hernia sac  Normal bowel caliber and wall thickness  Stomach and visualized esophagus are within normal limits  APPENDIX:  Within normal limits  ABDOMINOPELVIC CAVITY:  No abnormal air, fluid or enlarged lymph nodes  VESSELS:  Limited evaluation without intravenous contrast   No aneurysm  Atherosclerosis  PELVIS: REPRODUCTIVE ORGANS:  Within normal limits for age  URINARY BLADDER:  Within normal limits  ABDOMINAL WALL/INGUINAL REGIONS:  Persistent left inguinal hernia, now contains fat  Evidence of prior mesh hernia repair  OSSEOUS STRUCTURES:  Mild degenerative changes  Right femoral bone island  No acute change  Impression: Compared to 3 days ago, left inguinal hernia no longer contains bowel  No acute findings  Nonemergent findings above  Workstation performed: [id number]     Procedure: XR chest pa & lateral    Result Date: 5/17/2022  Narrative: CHEST INDICATION:   pre op eval, and history of emphysema  COMPARISON:  5/15/2020 EXAM PERFORMED/VIEWS:  XR CHEST PA & LATERAL FINDINGS: Cardiomediastinal silhouette appears unremarkable  The lungs are clear  No pneumothorax or pleural effusion  Osseous structures appear within normal limits for patient age  Impression: No acute cardiopulmonary disease  Workstation performed: [id number]         Hospital Course:   Alessandro Cr is a 62 y o  male who presented 5/16/2022 with known L inguinal hernia with intermittent incarceration  He reported recurring severe pain with vomiting and sweating 2/2 pain  The patient was taken to the operating room on 5/17/22  Laparoscopic hernia repair was attempted, however was converted to open and repaired with mesh   The patient's hospital course was uncomplicated  Patient was discharged on POD#0  On the day of discharge, the patient was voiding spontaneously, ambulating at baseline, tolerating a regular diet, on home dose of oxygen, and pain was well controlled  He understood all instructions for discharge  He was also given the names and numbers of the providers as well as instructions for follow up appointments  Condition at Discharge: fair     Physical Exam  Vitals reviewed  Constitutional:       General: He is not in acute distress  Appearance: Normal appearance  He is not toxic-appearing  HENT:      Head: Normocephalic and atraumatic  Eyes:      Extraocular Movements: Extraocular movements intact  Conjunctiva/sclera: Conjunctivae normal    Cardiovascular:      Rate and Rhythm: Normal rate and regular rhythm  Pulses: Normal pulses  Heart sounds: Normal heart sounds  No murmur heard  No friction rub  No gallop  Pulmonary:      Effort: Pulmonary effort is normal  No respiratory distress  Breath sounds: Normal breath sounds  No stridor  No wheezing, rhonchi or rales  Comments: 3L O2 NC (home O2)  Abdominal:      General: Abdomen is flat  Bowel sounds are normal  There is no distension  Palpations: Abdomen is soft  Tenderness: There is abdominal tenderness (appropriate incisional tenderness)  There is no guarding  Comments: Surgical dressings in place C/D/I   Musculoskeletal:         General: Normal range of motion  Cervical back: Normal range of motion  Right lower leg: No edema  Left lower leg: No edema  Skin:     General: Skin is warm and dry  Neurological:      Mental Status: He is alert and oriented to person, place, and time  Psychiatric:         Mood and Affect: Mood normal          Behavior: Behavior normal          Thought Content:  Thought content normal            Discharge instructions/Information to patient and family:   See after visit summary for information provided to patient and family  Provisions for Follow-Up Care:  See after visit summary for information related to follow-up care and any pertinent home health orders  Disposition: Home    Planned Readmission: No    Discharge Statement   I spent 30 minutes discharging the patient  This time was spent on the day of discharge  I had direct contact with the patient on the day of discharge  Additional documentation is required if more than 30 minutes were spent on discharge  Discharge Medications:  See after visit summary for reconciled discharge medications provided to patient and family      Warner Wynn PA-C  5/17/2022

## 2022-05-17 NOTE — PLAN OF CARE
Patient preparing for discharge home  VSS  Surgical dressings clean, dry and intact  No bleeding or hematoma noted  Patient able to tolerate dinner with no complaints  Patient able to void urine times 2 without difficulty  Denies nausea or vomiting  Able to ambulate around room  Gait steady  Patient verbalized understanding of incision care, medications, diagnosis and follow-up per AVS  Patient awaiting family for transport  No distress noted

## 2022-05-17 NOTE — ANESTHESIA PREPROCEDURE EVALUATION
Procedure:  REPAIR HERNIA INGUINAL, LAPAROSCOPIC (Left Groin)    Relevant Problems   CARDIO   (+) Coronary artery disease   (+) Primary hypertension      GI/HEPATIC   (+) Liver cancer (HCC)      HEMATOLOGY   (+) Iron deficiency anemia      PULMONARY   (+) COPD (chronic obstructive pulmonary disease) (HCC)    On chronic Oxygen 3L    2021  Left Ventricle: Systolic function is mildly decreased with an ejection   fraction of 45-50% (visual estimation)    Left Ventricle: Mild global hypokinesis    Right Ventricle: Right ventricle cavity is mildly dilated  Systolic   function is normal      Ascending Aorta: Aortic root and proximal ascending aorta dilated at   4 9 cm   Recommend dedicated aortic imaging with CT scan or MRI to   accurately assess aortic dimensions    Left Ventricle: There is grade II (moderate) diastolic dysfunction and   normal left atrial pressure    Aortic Valve: There is mild sclerosis   Cannot accurately assess the   number of aortic cusps  There is mild regurgitation    Pulmonic Valve: The estimated pulmonary artery systolic pressure is   18 2 mmHg  Normal pulmonary artery pressure  Physical Exam    Airway    Mallampati score: II  TM Distance: >3 FB  Neck ROM: full     Dental       Cardiovascular  Cardiovascular exam normal    Pulmonary  Pulmonary exam normal     Other Findings        Anesthesia Plan  ASA Score- 3     Anesthesia Type- general with ASA Monitors  Additional Monitors:   Airway Plan: ETT  Comment: Loose tooth bonded on left incisor    Plan Factors-Exercise tolerance (METS): >4 METS  Chart reviewed  EKG reviewed  Imaging results reviewed  Existing labs reviewed  Patient summary reviewed  Induction- intravenous  Postoperative Plan- Plan for postoperative opioid use  Planned trial extubation    Informed Consent- Anesthetic plan and risks discussed with patient  I personally reviewed this patient with the CRNA   Discussed and agreed on the Anesthesia Plan with the CRNA  Tariq Moore

## 2022-05-17 NOTE — PLAN OF CARE
Problem: PAIN - ADULT  Goal: Verbalizes/displays adequate comfort level or baseline comfort level  Description: Interventions:  - Encourage patient to monitor pain and request assistance  - Assess pain using appropriate pain scale  - Administer analgesics based on type and severity of pain and evaluate response  - Implement non-pharmacological measures as appropriate and evaluate response  - Consider cultural and social influences on pain and pain management  - Notify physician/advanced practitioner if interventions unsuccessful or patient reports new pain  5/17/2022 1723 by Lucia Felix RN  Outcome: Completed  5/17/2022 0727 by Lucia Felix RN  Outcome: Progressing     Problem: INFECTION - ADULT  Goal: Absence or prevention of progression during hospitalization  Description: INTERVENTIONS:  - Assess and monitor for signs and symptoms of infection  - Monitor lab/diagnostic results  - Monitor all insertion sites, i e  indwelling lines, tubes, and drains  - Monitor endotracheal if appropriate and nasal secretions for changes in amount and color  - [place] appropriate cooling/warming therapies per order  - Administer medications as ordered  - Instruct and encourage patient and family to use good hand hygiene technique  - Identify and instruct in appropriate isolation precautions for identified infection/condition  5/17/2022 1723 by Lucia Felix RN  Outcome: Completed  5/17/2022 0727 by Lucia Felix RN  Outcome: Progressing  Goal: Absence of fever/infection during neutropenic period  Description: INTERVENTIONS:  - Monitor WBC    5/17/2022 1723 by Lucia Felix RN  Outcome: Completed  5/17/2022 0727 by Lucia Felix RN  Outcome: Progressing     Problem: INFECTION - ADULT  Goal: Absence of fever/infection during neutropenic period  Description: INTERVENTIONS:  - Monitor WBC    5/17/2022 1723 by Lucia Felix RN  Outcome: Completed  5/17/2022 0727 by Lucia Felix RN  Outcome: Progressing     Problem: Nutrition/Hydration-ADULT  Goal: Nutrient/Hydration intake appropriate for improving, restoring or maintaining nutritional needs  Description: Monitor and assess patient's nutrition/hydration status for malnutrition  Collaborate with interdisciplinary team and initiate plan and interventions as ordered  Monitor patient's weight and dietary intake as ordered or per policy  Utilize nutrition screening tool and intervene as necessary  Determine patient's food preferences and provide high-protein, high-caloric foods as appropriate  INTERVENTIONS:  - Monitor oral intake, urinary output, labs, and treatment plans  - Assess nutrition and hydration status and recommend course of action  - Evaluate amount of meals eaten  - Assist patient with eating if necessary   - Allow adequate time for meals  - Recommend/ encourage appropriate diets, oral nutritional supplements, and vitamin/mineral supplements  - Order, calculate, and assess calorie counts as needed  - Recommend, monitor, and adjust tube feedings and TPN/PPN based on assessed needs  - Assess need for intravenous fluids  - Provide specific nutrition/hydration education as appropriate  - Include patient/family/caregiver in decisions related to nutrition  5/17/2022 1723 by Shefali Lundberg RN  Outcome: Completed  5/17/2022 0727 by Shefali Lundberg RN  Outcome: Progressing     Problem: Knowledge Deficit  Goal: Patient/family/caregiver demonstrates understanding of disease process, treatment plan, medications, and discharge instructions  Description: Complete learning assessment and assess knowledge base    Interventions:  - Provide teaching at level of understanding  - Provide teaching via preferred learning methods  5/17/2022 1723 by Shefali Lundberg RN  Outcome: Completed  5/17/2022 0727 by Shefali Lundberg RN  Outcome: Progressing     Problem: DISCHARGE PLANNING  Goal: Discharge to home or other facility with appropriate resources  Description: INTERVENTIONS:  - Identify barriers to discharge w/patient and caregiver  - Arrange for needed discharge resources and transportation as appropriate  - Identify discharge learning needs (meds, wound care, etc )  - Arrange for interpretive services to assist at discharge as needed  - Refer to Case Management Department for coordinating discharge planning if the patient needs post-hospital services based on physician/advanced practitioner order or complex needs related to functional status, cognitive ability, or social support system  5/17/2022 1723 by Tiarra Barber RN  Outcome: Completed  5/17/2022 0727 by Tiarra Barber RN  Outcome: Progressing   Patient preparing for discharge to home

## 2022-05-17 NOTE — PLAN OF CARE
Problem: PAIN - ADULT  Goal: Verbalizes/displays adequate comfort level or baseline comfort level  Description: Interventions:  - Encourage patient to monitor pain and request assistance  - Assess pain using appropriate pain scale  - Administer analgesics based on type and severity of pain and evaluate response  - Implement non-pharmacological measures as appropriate and evaluate response  - Consider cultural and social influences on pain and pain management  - Notify physician/advanced practitioner if interventions unsuccessful or patient reports new pain  Outcome: Progressing     Problem: INFECTION - ADULT  Goal: Absence or prevention of progression during hospitalization  Description: INTERVENTIONS:  - Assess and monitor for signs and symptoms of infection  - Monitor lab/diagnostic results  - Monitor all insertion sites, i e  indwelling lines, tubes, and drains  - Monitor endotracheal if appropriate and nasal secretions for changes in amount and color  - [place] appropriate cooling/warming therapies per order  - Administer medications as ordered  - Instruct and encourage patient and family to use good hand hygiene technique  - Identify and instruct in appropriate isolation precautions for identified infection/condition  Outcome: Progressing  Goal: Absence of fever/infection during neutropenic period  Description: INTERVENTIONS:  - Monitor WBC    Outcome: Progressing     Problem: DISCHARGE PLANNING  Goal: Discharge to home or other facility with appropriate resources  Description: INTERVENTIONS:  - Identify barriers to discharge w/patient and caregiver  - Arrange for needed discharge resources and transportation as appropriate  - Identify discharge learning needs (meds, wound care, etc )  - Arrange for interpretive services to assist at discharge as needed  - Refer to Case Management Department for coordinating discharge planning if the patient needs post-hospital services based on physician/advanced practitioner order or complex needs related to functional status, cognitive ability, or social support system  Outcome: Progressing     Problem: Knowledge Deficit  Goal: Patient/family/caregiver demonstrates understanding of disease process, treatment plan, medications, and discharge instructions  Description: Complete learning assessment and assess knowledge base  Interventions:  - Provide teaching at level of understanding  - Provide teaching via preferred learning methods  Outcome: Progressing     Problem: Nutrition/Hydration-ADULT  Goal: Nutrient/Hydration intake appropriate for improving, restoring or maintaining nutritional needs  Description: Monitor and assess patient's nutrition/hydration status for malnutrition  Collaborate with interdisciplinary team and initiate plan and interventions as ordered  Monitor patient's weight and dietary intake as ordered or per policy  Utilize nutrition screening tool and intervene as necessary  Determine patient's food preferences and provide high-protein, high-caloric foods as appropriate       INTERVENTIONS:  - Monitor oral intake, urinary output, labs, and treatment plans  - Assess nutrition and hydration status and recommend course of action  - Evaluate amount of meals eaten  - Assist patient with eating if necessary   - Allow adequate time for meals  - Recommend/ encourage appropriate diets, oral nutritional supplements, and vitamin/mineral supplements  - Order, calculate, and assess calorie counts as needed  - Recommend, monitor, and adjust tube feedings and TPN/PPN based on assessed needs  - Assess need for intravenous fluids  - Provide specific nutrition/hydration education as appropriate  - Include patient/family/caregiver in decisions related to nutrition  Outcome: Progressing

## 2022-05-17 NOTE — PLAN OF CARE
Patient received from PACU   VSS  Drowsy, easily awakened  Surgical sites dressings clean, dry and intact  No distress noted

## 2022-05-17 NOTE — ANESTHESIA POSTPROCEDURE EVALUATION
Post-Op Assessment Note    CV Status:  Stable  Pain Score: 0    Pain management: adequate     Mental Status:  Sleepy and arousable   Hydration Status:  Euvolemic   PONV Controlled:  Controlled   Airway Patency:  Patent      Post Op Vitals Reviewed: Yes      Staff: Anesthesiologist, CRNA         No complications documented      /79 (05/17/22 1315)    Temp 97 6 °F (36 4 °C) (05/17/22 1315)    Pulse 79 (05/17/22 1315)   Resp 16 (05/17/22 1315)    SpO2 97 % (05/17/22 1315)

## 2022-05-17 NOTE — CONSULTS
Consultation - Cardiology   Hailee Looney 62 y o  male MRN: [id number]  Unit/Bed#: OR POOL Encounter: [id number]  05/17/22  1:45 PM    Assessment/ Plan:  1  Preop evaluation for pending Lt inguinal hernia  2  H/o symptomatic PVCs s/p ablation    3  Nonsignificant CAD  4  HTN, under control  5  COPD on baseline 2 L    Plan:-  -given recent evaluation with cardiac catheterization showing nonsignificant CAD, no functional limitations, asymptomatic will not need any further cardiac workup inpatient  -Pt is class 1 risk with around 3 9% mortality from MI or cardiac arrest  -continue statin, lisinopril, metoprolol as before  -primary team updated    Thank you for the consultation  History of Present Illness   Physician Requesting Consult: Idania Granados MD    Reason for Consult / Principal Problem:  Preop clearance    HPI: Hailee Looney is a 62y o  year old male with PMH of PVCs s/p ablation, nonsignificant CAD, HTN, COPD on baseline 2 L presented with Lt-sided abdominal pain to ED found to have Lt inguinal hernia so surgery was consulted and is plan to do Lt inguinal hernia repair today  Cardiology was consulted for preop clearance  Pt states having no functional limitation and can not climb 2 flights of stairs w/o any issue, no chest pain, no chest tightness, no dizziness, no palpitations, no fever, no orthopnea, no prior H/o MI or HF  States having PVC and was seen by cardiologist and did cardiac catheterization prior to ablation which showed nonsignificant CAD and has been on medications since then  Does not follow up with Cardiology anymore  Consults    EKG:  NSR with PVC      Review of Systems   Constitutional: Negative for activity change, appetite change, chills, diaphoresis, fatigue, fever and unexpected weight change  HENT: Negative for rhinorrhea and sore throat  Eyes: Negative for visual disturbance  Respiratory: Negative for cough, chest tightness and shortness of breath  Cardiovascular: Negative for chest pain, palpitations and leg swelling  Gastrointestinal: Positive for abdominal pain  Negative for abdominal distention, blood in stool, constipation, diarrhea, nausea and vomiting  Genitourinary: Negative for difficulty urinating  Musculoskeletal: Negative for arthralgias  Neurological: Negative for headaches  Psychiatric/Behavioral: Negative for behavioral problems and sleep disturbance  Historical Information   Past Medical History:   Diagnosis Date    Angina pectoris ([facility] )     Asthma     Cancer ([facility] )     liver    Colon polyp     Coronary artery disease     Heart attack ([facility] )     Hyperlipidemia     Hypertension     Liver disease     Shortness of breath      Past Surgical History:   Procedure Laterality Date    BACK SURGERY      CHOLECYSTECTOMY      COLONOSCOPY      CORONARY ANGIOPLASTY WITH STENT PLACEMENT      HERNIA REPAIR      KNEE ARTHROSCOPY      TONSILLECTOMY       Social History     Substance and Sexual Activity   Alcohol Use Never     Social History     Substance and Sexual Activity   Drug Use Never     Social History     Tobacco Use   Smoking Status Never Smoker   Smokeless Tobacco Never Used       Family History: History reviewed  No pertinent family history      Meds/Allergies   all current active meds have been reviewed, current meds:   Current Facility-Administered Medications   Medication Dose Route Frequency    [MAR Hold] albuterol inhalation solution 2 5 mg  2 5 mg Nebulization Q6H PRN    [MAR Hold] atorvastatin (LIPITOR) tablet 40 mg  40 mg Oral Daily    [MAR Hold] dicyclomine (BENTYL) capsule 10 mg  10 mg Oral BID    diphenhydrAMINE (BENADRYL) injection 12 5 mg  12 5 mg Intravenous Once PRN    [MAR Hold] docusate sodium (COLACE) capsule 100 mg  100 mg Oral BID    [MAR Hold] heparin (porcine) subcutaneous injection 5,000 Units  5,000 Units Subcutaneous Q8H [facility]    HYDROmorphone (DILAUDID) injection 0 2 mg  0 2 mg Intravenous Q10 Min PRN    lactated ringers infusion  125 mL/hr Intravenous Continuous    metoclopramide (REGLAN) injection 10 mg  10 mg Intravenous Once PRN    [MAR Hold] metoprolol tartrate (LOPRESSOR) tablet 25 mg  25 mg Oral Q12H [address]    [MAR Hold] morphine injection 2 mg  2 mg Intravenous Q3H PRN    [MAR Hold] nitroglycerin (NITROSTAT) SL tablet 0 4 mg  0 4 mg Sublingual Q5 Min PRN    [MAR Hold] ondansetron (ZOFRAN) injection 4 mg  4 mg Intravenous Q6H PRN    [MAR Hold] pantoprazole (PROTONIX) EC tablet 40 mg  40 mg Oral Early Morning   , and PTA meds:   Prior to Admission Medications   Prescriptions Last Dose Informant Patient Reported? Taking? Probiotic Product (Bacid) TABS 5/15/2022 at Unknown time  Yes Yes   Sig: Take 1 tablet by mouth in the morning and 1 tablet in the evening  albuterol (2 5 mg/3 mL) 0 083 % nebulizer solution 5/15/2022 at Unknown time  Yes Yes   Sig: Take 2 5 mg by nebulization every 6 (six) hours as needed for wheezing or shortness of breath   atorvastatin (LIPITOR) 40 mg tablet 5/15/2022 at Unknown time  Yes Yes   Sig: Take 40 mg by mouth in the morning  dicyclomine (BENTYL) 10 mg capsule 5/15/2022 at Unknown time  Yes Yes   Sig: Take 10 mg by mouth in the morning and 10 mg at noon and 10 mg in the evening  lisinopril (ZESTRIL) 10 mg tablet 5/15/2022 at Unknown time  Yes Yes   Sig: Take 10 mg by mouth in the morning  metoprolol tartrate (LOPRESSOR) 25 mg tablet 5/15/2022 at Unknown time  Yes Yes   Sig: Take 25 mg by mouth every 12 (twelve) hours   nitroglycerin (NITROSTAT) 0 3 mg SL tablet 5/15/2022 at Unknown time  Yes Yes   Sig: Place 0 3 mg under the tongue every 5 (five) minutes as needed for chest pain   omeprazole (PriLOSEC) 20 mg delayed release capsule 5/15/2022 at Unknown time  Yes Yes   Sig: Take 20 mg by mouth in the morning     ondansetron (ZOFRAN-ODT) 4 mg disintegrating tablet 5/15/2022 at Unknown time  Yes Yes   Sig: Take 4 mg by mouth   oxyCODONE-acetaminophen (PERCOCET) 5-325 mg per tablet 5/15/2022 at Unknown time  Yes Yes   Sig: TAKE 1 TAB AS NEEDED EVERY 6 HOURS DAY 1, 1 TAB EVERY 12 HOURS DAYS 2 1 TAB ONCE ON DAY 3    raNITIdine HCl (ZANTAC 75 PO) 5/15/2022 at Unknown time  Yes Yes   Sig: Take by mouth      Facility-Administered Medications: None     Allergies   Allergen Reactions    Iodine - Food Allergy Anaphylaxis    Penicillins Anaphylaxis       Objective   Vitals: Blood pressure 126/77, pulse 68, temperature 97 6 °F (36 4 °C), resp  rate 13, height 5' 7 01" (1 702 m), weight 79 3 kg (174 lb 13 2 oz), SpO2 93 %  , Body mass index is 27 37 kg/m² ,   Orthostatic Blood Pressures    Flowsheet Row Most Recent Value   Blood Pressure 126/77 filed at 05/17/2022 1330   Patient Position - Orthostatic VS Sitting filed at 05/16/2022 9180          Systolic (06QSW), VQX:259 , Min:125 , MTC:937     Diastolic (93FGH), TCJ:08, Min:63, Max:79        Intake/Output Summary (Last 24 hours) at 5/17/2022 1345  Last data filed at 5/17/2022 1321  Gross per 24 hour   Intake 2700 ml   Output 1650 ml   Net 1050 ml       Invasive Devices  Report    Peripheral Intravenous Line  Duration           Peripheral IV 05/17/22 Distal;Left;Upper;Ventral (anterior) Arm <1 day                    Physical Exam:  GEN: Alert and oriented x 3, in no acute distress  Well appearing and well nourished  HEENT: Sclera anicteric, conjunctivae pink, mucous membranes moist  Oropharynx clear  NECK: Supple, no carotid bruits, no significant JVD  Trachea midline, no thyromegaly  HEART: Regular rhythm, normal S1 and S2, no murmurs, clicks, gallops or rubs  PMI nondisplaced, no thrills  LUNGS: Clear to auscultation bilaterally; no wheezes, rales, or rhonchi  No increased work of breathing or signs of respiratory distress  ABDOMEN: Soft, nontender, nondistended, normoactive bowel sounds    Lt inguinal mass protruding more on cough   EXTREMITIES: Skin warm and well perfused, no clubbing, cyanosis, or edema   NEURO: No focal findings  Normal speech  Mood and affect normal    SKIN: Normal without suspicious lesions on exposed skin        Lab Results:     Troponins:       CBC with diff:   Results from last 7 days   Lab Units 05/17/22  0452 05/16/22  0501 05/13/22  0945   WBC Thousand/uL 11 90* 12 48* 16 98*   HEMOGLOBIN g/dL 9 3* 9 6* 9 8*   HEMATOCRIT % 30 2* 30 6* 30 8*   MCV fL 60* 60* 59*   PLATELETS Thousands/uL 275 293 233   MCH pg 18 6* 18 8* 18 8*   MCHC g/dL 30 8* 31 4 31 8   RDW % 17 5* 17 5* 16 4*   MPV fL 9 1 9 6 9 5   NRBC AUTO /100 WBCs 0 0 0         CMP:   Results from last 7 days   Lab Units 05/17/22  0452 05/16/22  0501 05/13/22  0945   POTASSIUM mmol/L 4 0 3 7 3 6   CHLORIDE mmol/L 105 104 99*   CO2 mmol/L 29 27 27   BUN mg/dL 15 23 21   CREATININE mg/dL 0 86 1 09 0 93   CALCIUM mg/dL 8 7 8 5 8 8   AST U/L  --  18 21   ALT U/L  --  22 28   ALK PHOS U/L  --  78 71   EGFR ml/min/1 73sq m 94 73 88

## 2022-05-17 NOTE — CASE MANAGEMENT
Case Management Discharge Planning Note    Patient name Praneeth Denton  Location [facility] [id number] MRN [id number]  : 1960 Date 2022       Current Admission Date: 2022  Current Admission Diagnosis:Left inguinal hernia   Patient Active Problem List    Diagnosis Date Noted    Left inguinal hernia 2022    Coronary artery disease 2022    Iron deficiency anemia 2022    Liver cancer ([facility] ) 2022    Abnormal CT scan 2021    Diverticulitis of large intestine without perforation or abscess without bleeding 2021    COPD (chronic obstructive pulmonary disease) ([facility] ) 2021    Primary hypertension 2021      LOS (days): 0  Geometric Mean LOS (GMLOS) (days):   Days to GMLOS:     OBJECTIVE:            Current admission status: Outpatient Surgery   Preferred Pharmacy:   [facility] [address] [place] [address], [address] [place] [address] Drive  Phone: [phone] Fax: [phone]    Primary Care Provider: Elayne Cason MD    Primary Insurance: [employer]  Secondary Insurance:     DISCHARGE DETAILS:    Additional Comments: Patient reviewed with general surgery who informed that pt will likely be medically stable for discharge in 24 hours pending pain management  CM called pt's , Jagdish Correia ([phone]) to inform of same  She requested to be informed if discharge is delayed further

## 2022-05-17 NOTE — QUICK NOTE
Detail consult to follow    Patient seen and examined    No contraindication from cardiac standpoint to proceed with the planned surgery  Patient will be considered low cardiac risk for inguinal hernia surgery    Discussed with patient and surgical service

## 2022-05-18 ENCOUNTER — TELEPHONE (OUTPATIENT)
Dept: CASE MANAGEMENT | Facility: HOSPITAL | Age: 62
End: 2022-05-18

## 2022-05-18 NOTE — TELEPHONE ENCOUNTER
CM received VM from pt's , Josefina Ospina ([phone]) requesting a letter be faxed to F: [phone] confirming pt's discharge  Letter was faxed as requested  Recommended artificial tears to use: 1 drop 4x a day in both eyes.

## 2022-05-19 ENCOUNTER — APPOINTMENT (EMERGENCY)
Dept: CT IMAGING | Facility: HOSPITAL | Age: 62
End: 2022-05-19
Payer: COMMERCIAL

## 2022-05-19 ENCOUNTER — APPOINTMENT (EMERGENCY)
Dept: RADIOLOGY | Facility: HOSPITAL | Age: 62
End: 2022-05-19
Payer: COMMERCIAL

## 2022-05-19 ENCOUNTER — HOSPITAL ENCOUNTER (EMERGENCY)
Facility: HOSPITAL | Age: 62
Discharge: HOME | End: 2022-05-19
Attending: EMERGENCY MEDICINE
Payer: COMMERCIAL

## 2022-05-19 VITALS
OXYGEN SATURATION: 99 % | TEMPERATURE: 98.5 F | WEIGHT: 172 LBS | BODY MASS INDEX: 26.93 KG/M2 | RESPIRATION RATE: 24 BRPM | SYSTOLIC BLOOD PRESSURE: 114 MMHG | HEART RATE: 81 BPM | DIASTOLIC BLOOD PRESSURE: 60 MMHG

## 2022-05-19 DIAGNOSIS — J44.1 COPD EXACERBATION (HCC): Primary | ICD-10-CM

## 2022-05-19 DIAGNOSIS — R68.89 CLINICAL DECOMPENSATION: ICD-10-CM

## 2022-05-19 LAB
2HR DELTA HS TROPONIN: -1 NG/L
4HR DELTA HS TROPONIN: -1 NG/L
ALBUMIN SERPL BCP-MCNC: 3.5 G/DL (ref 3.5–5)
ALP SERPL-CCNC: 76 U/L (ref 46–116)
ALT SERPL W P-5'-P-CCNC: 19 U/L (ref 12–78)
ANION GAP SERPL CALCULATED.3IONS-SCNC: 8 MMOL/L (ref 4–13)
AST SERPL W P-5'-P-CCNC: 19 U/L (ref 5–45)
ATRIAL RATE: 86 BPM
BACTERIA UR QL AUTO: NORMAL /HPF
BASOPHILS # BLD AUTO: 0.05 THOUSANDS/ΜL (ref 0–0.1)
BASOPHILS NFR BLD AUTO: 0 % (ref 0–1)
BILIRUB SERPL-MCNC: 1.05 MG/DL (ref 0.2–1)
BILIRUB UR QL STRIP: NEGATIVE
BUN SERPL-MCNC: 17 MG/DL (ref 5–25)
CALCIUM SERPL-MCNC: 8.7 MG/DL (ref 8.3–10.1)
CARDIAC TROPONIN I PNL SERPL HS: 3 NG/L
CARDIAC TROPONIN I PNL SERPL HS: 3 NG/L
CARDIAC TROPONIN I PNL SERPL HS: 4 NG/L
CHLORIDE SERPL-SCNC: 99 MMOL/L (ref 100–108)
CLARITY UR: CLEAR
CO2 SERPL-SCNC: 29 MMOL/L (ref 21–32)
COLOR UR: YELLOW
CREAT SERPL-MCNC: 0.79 MG/DL (ref 0.6–1.3)
EOSINOPHIL # BLD AUTO: 0.1 THOUSAND/ΜL (ref 0–0.61)
EOSINOPHIL NFR BLD AUTO: 1 % (ref 0–6)
ERYTHROCYTE [DISTWIDTH] IN BLOOD BY AUTOMATED COUNT: 18.4 % (ref 11.6–15.1)
FLUAV RNA RESP QL NAA+PROBE: NEGATIVE
FLUBV RNA RESP QL NAA+PROBE: NEGATIVE
GFR SERPL CREATININE-BSD FRML MDRD: 97 ML/MIN/1.73SQ M
GLUCOSE SERPL-MCNC: 121 MG/DL (ref 65–140)
GLUCOSE UR STRIP-MCNC: NEGATIVE MG/DL
HCT VFR BLD AUTO: 32.7 % (ref 36.5–49.3)
HGB BLD-MCNC: 10.4 G/DL (ref 12–17)
HGB UR QL STRIP.AUTO: ABNORMAL
IMM GRANULOCYTES # BLD AUTO: 0.17 THOUSAND/UL (ref 0–0.2)
IMM GRANULOCYTES NFR BLD AUTO: 1 % (ref 0–2)
KETONES UR STRIP-MCNC: NEGATIVE MG/DL
LEUKOCYTE ESTERASE UR QL STRIP: NEGATIVE
LIPASE SERPL-CCNC: 27 U/L (ref 73–393)
LYMPHOCYTES # BLD AUTO: 1.54 THOUSANDS/ΜL (ref 0.6–4.47)
LYMPHOCYTES NFR BLD AUTO: 11 % (ref 14–44)
MAGNESIUM SERPL-MCNC: 1.9 MG/DL (ref 1.6–2.6)
MCH RBC QN AUTO: 18.9 PG (ref 26.8–34.3)
MCHC RBC AUTO-ENTMCNC: 31.8 G/DL (ref 31.4–37.4)
MCV RBC AUTO: 60 FL (ref 82–98)
MONOCYTES # BLD AUTO: 0.91 THOUSAND/ΜL (ref 0.17–1.22)
MONOCYTES NFR BLD AUTO: 7 % (ref 4–12)
NEUTROPHILS # BLD AUTO: 11.26 THOUSANDS/ΜL (ref 1.85–7.62)
NEUTS SEG NFR BLD AUTO: 80 % (ref 43–75)
NITRITE UR QL STRIP: NEGATIVE
NON-SQ EPI CELLS URNS QL MICRO: NORMAL /HPF
NRBC BLD AUTO-RTO: 0 /100 WBCS
P AXIS: 40 DEGREES
PH UR STRIP.AUTO: 6 [PH]
PLATELET # BLD AUTO: 329 THOUSANDS/UL (ref 149–390)
PMV BLD AUTO: 9 FL (ref 8.9–12.7)
POTASSIUM SERPL-SCNC: 3.9 MMOL/L (ref 3.5–5.3)
PR INTERVAL: 162 MS
PROT SERPL-MCNC: 7.2 G/DL (ref 6.4–8.2)
PROT UR STRIP-MCNC: NEGATIVE MG/DL
QRS AXIS: 48 DEGREES
QRSD INTERVAL: 88 MS
QT INTERVAL: 338 MS
QTC INTERVAL: 404 MS
RBC # BLD AUTO: 5.49 MILLION/UL (ref 3.88–5.62)
RBC #/AREA URNS AUTO: NORMAL /HPF
RSV RNA RESP QL NAA+PROBE: NEGATIVE
SARS-COV-2 RNA RESP QL NAA+PROBE: NEGATIVE
SODIUM SERPL-SCNC: 136 MMOL/L (ref 136–145)
SP GR UR STRIP.AUTO: 1.01 (ref 1–1.03)
T WAVE AXIS: 10 DEGREES
UROBILINOGEN UR QL STRIP.AUTO: 0.2 E.U./DL
VENTRICULAR RATE: 86 BPM
WBC # BLD AUTO: 14.03 THOUSAND/UL (ref 4.31–10.16)
WBC #/AREA URNS AUTO: NORMAL /HPF

## 2022-05-19 PROCEDURE — 36415 COLL VENOUS BLD VENIPUNCTURE: CPT | Performed by: EMERGENCY MEDICINE

## 2022-05-19 PROCEDURE — 97166 OT EVAL MOD COMPLEX 45 MIN: CPT

## 2022-05-19 PROCEDURE — 83735 ASSAY OF MAGNESIUM: CPT | Performed by: EMERGENCY MEDICINE

## 2022-05-19 PROCEDURE — 97163 PT EVAL HIGH COMPLEX 45 MIN: CPT

## 2022-05-19 PROCEDURE — 83690 ASSAY OF LIPASE: CPT | Performed by: EMERGENCY MEDICINE

## 2022-05-19 PROCEDURE — 99285 EMERGENCY DEPT VISIT HI MDM: CPT | Performed by: EMERGENCY MEDICINE

## 2022-05-19 PROCEDURE — 71250 CT THORAX DX C-: CPT

## 2022-05-19 PROCEDURE — 81001 URINALYSIS AUTO W/SCOPE: CPT | Performed by: EMERGENCY MEDICINE

## 2022-05-19 PROCEDURE — 71046 X-RAY EXAM CHEST 2 VIEWS: CPT

## 2022-05-19 PROCEDURE — 85025 COMPLETE CBC W/AUTO DIFF WBC: CPT | Performed by: EMERGENCY MEDICINE

## 2022-05-19 PROCEDURE — 84484 ASSAY OF TROPONIN QUANT: CPT | Performed by: EMERGENCY MEDICINE

## 2022-05-19 PROCEDURE — 94640 AIRWAY INHALATION TREATMENT: CPT

## 2022-05-19 PROCEDURE — 74176 CT ABD & PELVIS W/O CONTRAST: CPT

## 2022-05-19 PROCEDURE — 93005 ELECTROCARDIOGRAM TRACING: CPT

## 2022-05-19 PROCEDURE — 96361 HYDRATE IV INFUSION ADD-ON: CPT

## 2022-05-19 PROCEDURE — 80053 COMPREHEN METABOLIC PANEL: CPT | Performed by: EMERGENCY MEDICINE

## 2022-05-19 PROCEDURE — 93010 ELECTROCARDIOGRAM REPORT: CPT | Performed by: INTERNAL MEDICINE

## 2022-05-19 PROCEDURE — 96374 THER/PROPH/DIAG INJ IV PUSH: CPT

## 2022-05-19 PROCEDURE — 99284 EMERGENCY DEPT VISIT MOD MDM: CPT

## 2022-05-19 PROCEDURE — 0241U HB NFCT DS VIR RESP RNA 4 TRGT: CPT | Performed by: EMERGENCY MEDICINE

## 2022-05-19 RX ORDER — MORPHINE SULFATE 4 MG/ML
4 INJECTION, SOLUTION INTRAMUSCULAR; INTRAVENOUS ONCE
Status: COMPLETED | OUTPATIENT
Start: 2022-05-19 | End: 2022-05-19

## 2022-05-19 RX ORDER — IPRATROPIUM BROMIDE AND ALBUTEROL SULFATE 2.5; .5 MG/3ML; MG/3ML
3 SOLUTION RESPIRATORY (INHALATION)
Status: DISPENSED | OUTPATIENT
Start: 2022-05-19 | End: 2022-05-19

## 2022-05-19 RX ORDER — DOXYCYCLINE HYCLATE 100 MG/1
100 CAPSULE ORAL ONCE
Status: COMPLETED | OUTPATIENT
Start: 2022-05-19 | End: 2022-05-19

## 2022-05-19 RX ORDER — DOXYCYCLINE HYCLATE 100 MG/1
100 CAPSULE ORAL 2 TIMES DAILY
Qty: 10 CAPSULE | Refills: 0 | Status: SHIPPED | OUTPATIENT
Start: 2022-05-19 | End: 2022-05-24

## 2022-05-19 RX ORDER — DEXAMETHASONE 4 MG/1
10 TABLET ORAL ONCE
Status: COMPLETED | OUTPATIENT
Start: 2022-05-19 | End: 2022-05-19

## 2022-05-19 RX ADMIN — MORPHINE SULFATE 4 MG: 4 INJECTION INTRAVENOUS at 09:03

## 2022-05-19 RX ADMIN — DEXAMETHASONE 10 MG: 4 TABLET ORAL at 12:27

## 2022-05-19 RX ADMIN — SODIUM CHLORIDE 1000 ML: 0.9 INJECTION, SOLUTION INTRAVENOUS at 08:56

## 2022-05-19 RX ADMIN — DOXYCYCLINE 100 MG: 100 CAPSULE ORAL at 12:27

## 2022-05-19 RX ADMIN — IPRATROPIUM BROMIDE AND ALBUTEROL SULFATE 3 ML: 2.5; .5 SOLUTION RESPIRATORY (INHALATION) at 09:03

## 2022-05-19 NOTE — ED PROVIDER NOTES
History  Chief Complaint   Patient presents with    Fall     Pt slid from chair earlier today, landing on buttocks  Denies head strike at all  Aspirin daily  Recently discharged from hospital following abd sx for hernia repair  Patient is 71-year-old male past medical history of liver cancer in 2014 status post radiation, COPD on 3 L home oxygen, CAD status post MI, hypertension, hyperlipidemia, diverticulitis, anemia presenting with shortness of breath, decompensation  Patient states that beginning 2 days ago after he was discharged from the hospital began having nasal congestion, cough productive of green sputum as well as shortness of breath which he feels is worse at rest   He also states that he feels weak and feels that he cannot take care of himself  States he is having lower quadrant burning abdominal pain bilaterally at surgical sites which is worse with movement and nonradiating and is prevent him from completing his ADLs and taking care of himself  He also states that he feels weak and notes bilateral calf pain beginning yesterday  States that he slid out of bed today onto his bottom and denies any head trauma  He also states that he lives with his 70-year-old mother who is unable to care for him  He does take aspirin  Was prescribed Percocet for pain however states he did not take any today or yesterday  States he was not able to use his nebulizer as he could not get it working by himself  Is status post inguinal hernia repair on 05/17  Denies any chest pain, fevers, vomiting or diarrhea, rashes, vision changes but does note nausea  Notes baseline dysuria which he feels has worsened since surgery  Prior to Admission Medications   Prescriptions Last Dose Informant Patient Reported? Taking? Probiotic Product (Bacid) TABS   Yes No   Sig: Take 1 tablet by mouth in the morning and 1 tablet in the evening     albuterol (2 5 mg/3 mL) 0 083 % nebulizer solution   Yes No   Sig: Take 2 5 mg by nebulization every 6 (six) hours as needed for wheezing or shortness of breath   atorvastatin (LIPITOR) 40 mg tablet   Yes No   Sig: Take 40 mg by mouth in the morning  dicyclomine (BENTYL) 10 mg capsule   Yes No   Sig: Take 10 mg by mouth in the morning and 10 mg at noon and 10 mg in the evening  docusate sodium (COLACE) 100 mg capsule   No No   Sig: Take 1 capsule (100 mg total) by mouth in the morning and 1 capsule (100 mg total) in the evening  Do all this for 7 days  lisinopril (ZESTRIL) 10 mg tablet   Yes No   Sig: Take 10 mg by mouth in the morning  metoprolol tartrate (LOPRESSOR) 25 mg tablet   Yes No   Sig: Take 25 mg by mouth every 12 (twelve) hours   nitroglycerin (NITROSTAT) 0 3 mg SL tablet   Yes No   Sig: Place 0 3 mg under the tongue every 5 (five) minutes as needed for chest pain   omeprazole (PriLOSEC) 20 mg delayed release capsule   Yes No   Sig: Take 20 mg by mouth in the morning     ondansetron (ZOFRAN-ODT) 4 mg disintegrating tablet   Yes No   Sig: Take 4 mg by mouth   oxyCODONE (Roxicodone) 5 immediate release tablet   No No   Sig: Take 1 tablet (5 mg total) by mouth every 4 (four) hours as needed for moderate pain for up to 3 days Max Daily Amount: 30 mg   oxyCODONE-acetaminophen (PERCOCET) 5-325 mg per tablet   Yes No   Sig: TAKE 1 TAB AS NEEDED EVERY 6 HOURS DAY 1, 1 TAB EVERY 12 HOURS DAYS 2 1 TAB ONCE ON DAY 3    raNITIdine HCl (ZANTAC 75 PO)   Yes No   Sig: Take by mouth      Facility-Administered Medications: None       Past Medical History:   Diagnosis Date    Angina pectoris (HCC)     Asthma     Cancer ([facility] )     liver    Colon polyp     Coronary artery disease     Heart attack ([facility] )     Hyperlipidemia     Hypertension     Liver disease     Shortness of breath        Past Surgical History:   Procedure Laterality Date    BACK SURGERY      CHOLECYSTECTOMY      COLONOSCOPY      CORONARY ANGIOPLASTY WITH STENT PLACEMENT      HERNIA REPAIR      HERNIA REPAIR Left 5/17/2022    Procedure: laparoscopic attempted converted to open LEFT inguinal hernia repair with mesh;  Surgeon: Laurinda Eisenmenger, MD;  Location: [facility];  Service: General    INGUINAL HERNIA REPAIR Left 05/17/2022    laparoscopic attempted converted to open L inguinal hernia repair w/mesh, Dr Jeri Ingram         History reviewed  No pertinent family history  I have reviewed and agree with the history as documented  E-Cigarette/Vaping    E-Cigarette Use Never User      E-Cigarette/Vaping Substances    Nicotine No     THC No     CBD No     Flavoring No     Other No     Unknown No      Social History     Tobacco Use    Smoking status: Never Smoker    Smokeless tobacco: Never Used   Vaping Use    Vaping Use: Never used   Substance Use Topics    Alcohol use: Never    Drug use: Never       Review of Systems   All other systems reviewed and are negative  Physical Exam  Physical Exam  Vitals reviewed  Constitutional:       General: He is not in acute distress  Appearance: Normal appearance  He is not ill-appearing  HENT:      Mouth/Throat:      Mouth: Mucous membranes are dry  Eyes:      Conjunctiva/sclera: Conjunctivae normal    Cardiovascular:      Rate and Rhythm: Normal rate and regular rhythm  Heart sounds: Normal heart sounds  Pulmonary:      Effort: Pulmonary effort is normal       Breath sounds: Normal breath sounds  Abdominal:      General: Abdomen is flat  Palpations: Abdomen is soft  Tenderness: There is abdominal tenderness  There is no guarding  Comments: Mild tenderness without guarding at surgical sites   Musculoskeletal:         General: No swelling  Normal range of motion  Cervical back: Neck supple  Right lower leg: No edema  Left lower leg: No edema  Skin:     General: Skin is warm and dry  Neurological:      General: No focal deficit present  Mental Status: He is alert  Psychiatric:         Mood and Affect: Mood normal          Vital Signs  ED Triage Vitals [05/19/22 0830]   Temperature Pulse Respirations Blood Pressure SpO2   98 5 °F (36 9 °C) 87 17 117/69 98 %      Temp Source Heart Rate Source Patient Position - Orthostatic VS BP Location FiO2 (%)   Oral Monitor Lying Right arm --      Pain Score       8           Vitals:    05/19/22 0830 05/19/22 1107 05/19/22 1130   BP: 117/69 139/64 114/60   Pulse: 87 88 81   Patient Position - Orthostatic VS: Lying Lying Lying         Visual Acuity  Visual Acuity    Flowsheet Row Most Recent Value   L Pupil Size (mm) 3   R Pupil Size (mm) 3          ED Medications  Medications   ipratropium-albuterol (DUO-NEB) 0 5-2 5 mg/3 mL inhalation solution 3 mL (3 mL Nebulization Not Given 5/19/22 1209)   morphine (PF) 4 mg/mL injection 4 mg (4 mg Intravenous Given 5/19/22 0903)   sodium chloride 0 9 % bolus 1,000 mL (0 mL Intravenous Stopped 5/19/22 1228)   dexamethasone (DECADRON) tablet 10 mg (10 mg Oral Given 5/19/22 1227)   doxycycline hyclate (VIBRAMYCIN) capsule 100 mg (100 mg Oral Given 5/19/22 1227)       Diagnostic Studies  Results Reviewed     Procedure Component Value Units Date/Time    HS Troponin I 4hr [[id number]]  (Normal) Collected: 05/19/22 1230    Lab Status: Final result Specimen: Blood from Arm, Right Updated: 05/19/22 1303     hs TnI 4hr 3 ng/L      Delta 4hr hsTnI -1 ng/L     Urine Microscopic [[id number]]  (Normal) Collected: 05/19/22 1156    Lab Status: Final result Specimen: Urine, Clean Catch Updated: 05/19/22 1229     RBC, UA 0-1 /hpf      WBC, UA None Seen /hpf      Epithelial Cells Occasional /hpf      Bacteria, UA Occasional /hpf     UA w Reflex to Microscopic w Reflex to Culture [[id number]]  (Abnormal) Collected: 05/19/22 1156    Lab Status: Final result Specimen: Urine, Clean Catch Updated: 05/19/22 1216     Color, UA Yellow     Clarity, UA Clear     Specific Gravity, UA 1 015     pH, UA 6 0     Leukocytes, UA Negative Nitrite, UA Negative     Protein, UA Negative mg/dl      Glucose, UA Negative mg/dl      Ketones, UA Negative mg/dl      Urobilinogen, UA 0 2 E U /dl      Bilirubin, UA Negative     Blood, UA Trace-Intact    HS Troponin I 2hr [[id number]]  (Normal) Collected: 05/19/22 1106    Lab Status: Final result Specimen: Blood from Arm, Right Updated: 05/19/22 1138     hs TnI 2hr 3 ng/L      Delta 2hr hsTnI -1 ng/L     COVID/FLU/RSV - 2 hour TAT [[id number]]  (Normal) Collected: 05/19/22 0852    Lab Status: Final result Specimen: Nares from Nose Updated: 05/19/22 1044     SARS-CoV-2 Negative     INFLUENZA A PCR Negative     INFLUENZA B PCR Negative     RSV PCR Negative    Narrative:      FOR PEDIATRIC PATIENTS - copy/paste COVID Guidelines URL to browser: [email or web address]  [employer]    SARS-CoV-2 assay is a Nucleic Acid Amplification assay intended for the  qualitative detection of nucleic acid from SARS-CoV-2 in nasopharyngeal  swabs  Results are for the presumptive identification of SARS-CoV-2 RNA  Positive results are indicative of infection with SARS-CoV-2, the virus  causing COVID-19, but do not rule out bacterial infection or co-infection  with other viruses  Laboratories within the [place] and its  territories are required to report all positive results to the appropriate  public health authorities  Negative results do not preclude SARS-CoV-2  infection and should not be used as the sole basis for treatment or other  patient management decisions  Negative results must be combined with  clinical observations, patient history, and epidemiological information  This test has not been FDA cleared or approved  This test has been authorized by FDA under an Emergency Use Authorization  (EUA)   This test is only authorized for the duration of time the  declaration that circumstances exist justifying the authorization of the  emergency use of an in vitro diagnostic tests for detection of SARS-CoV-2  virus and/or diagnosis of COVID-19 infection under section 564(b)(1) of  the Act, 21 U  S C  452LDB-9(Z)(5), unless the authorization is terminated  or revoked sooner  The test has been validated but independent review by FDA  and CLIA is pending  Test performed using [employer] GeneXpert: This RT-PCR assay targets N2,  a region unique to SARS-CoV-2  A conserved region in the E-gene was chosen  for pan-Sarbecovirus detection which includes SARS-CoV-2      HS Troponin 0hr (reflex protocol) [[id number]]  (Normal) Collected: 05/19/22 0852    Lab Status: Final result Specimen: Blood from Arm, Right Updated: 05/19/22 0934     hs TnI 0hr 4 ng/L     Lipase [[id number]]  (Abnormal) Collected: 05/19/22 0852    Lab Status: Final result Specimen: Blood from Arm, Right Updated: 05/19/22 0929     Lipase 27 u/L     Comprehensive metabolic panel [[id number]]  (Abnormal) Collected: 05/19/22 0852    Lab Status: Final result Specimen: Blood from Arm, Right Updated: 05/19/22 0929     Sodium 136 mmol/L      Potassium 3 9 mmol/L      Chloride 99 mmol/L      CO2 29 mmol/L      ANION GAP 8 mmol/L      BUN 17 mg/dL      Creatinine 0 79 mg/dL      Glucose 121 mg/dL      Calcium 8 7 mg/dL      AST 19 U/L      ALT 19 U/L      Alkaline Phosphatase 76 U/L      Total Protein 7 2 g/dL      Albumin 3 5 g/dL      Total Bilirubin 1 05 mg/dL      eGFR 97 ml/min/1 73sq m     Narrative:      [place] guidelines for Chronic Kidney Disease (CKD):     Stage 1 with normal or high GFR (GFR > 90 mL/min/1 73 square meters)    Stage 2 Mild CKD (GFR = 60-89 mL/min/1 73 square meters)    Stage 3A Moderate CKD (GFR = 45-59 mL/min/1 73 square meters)    Stage 3B Moderate CKD (GFR = 30-44 mL/min/1 73 square meters)    Stage 4 Severe CKD (GFR = 15-29 mL/min/1 73 square meters)    Stage 5 End Stage CKD (GFR <15 mL/min/1 73 square meters)  Note: GFR calculation is accurate only with a steady state creatinine    Magnesium [[id number]]  (Normal) Collected: 05/19/22 0852    Lab Status: Final result Specimen: Blood from Arm, Right Updated: 05/19/22 0929     Magnesium 1 9 mg/dL     CBC and differential [[id number]]  (Abnormal) Collected: 05/19/22 0852    Lab Status: Final result Specimen: Blood from Arm, Right Updated: 05/19/22 0901     WBC 14 03 Thousand/uL      RBC 5 49 Million/uL      Hemoglobin 10 4 g/dL      Hematocrit 32 7 %      MCV 60 fL      MCH 18 9 pg      MCHC 31 8 g/dL      RDW 18 4 %      MPV 9 0 fL      Platelets 025 Thousands/uL      nRBC 0 /100 WBCs      Neutrophils Relative 80 %      Immat GRANS % 1 %      Lymphocytes Relative 11 %      Monocytes Relative 7 %      Eosinophils Relative 1 %      Basophils Relative 0 %      Neutrophils Absolute 11 26 Thousands/µL      Immature Grans Absolute 0 17 Thousand/uL      Lymphocytes Absolute 1 54 Thousands/µL      Monocytes Absolute 0 91 Thousand/µL      Eosinophils Absolute 0 10 Thousand/µL      Basophils Absolute 0 05 Thousands/µL                  CT chest abdomen pelvis wo contrast   Final Result by Lyman Crigler, MD (05/19 1028)      1  No acute abnormality in the chest       2   Postoperative changes status post left inguinal hernia repair with predominantly extraperitoneal/chest wall gas extending into the lower chest with a small amount of intraperitoneal gas in the upper abdomen  3   Stable liver masses likely representing hemangiomata  Workstation performed: [id number]         XR chest 2 views   Final Result by Seymour Morrison MD (10/63 2298)      No acute cardiopulmonary disease                    Workstation performed: [id number]                    Procedures  ECG 12 Lead Documentation Only    Date/Time: 5/19/2022 9:16 AM  Performed by: Niki Yadav DO  Authorized by: Niki Yadav DO     ECG reviewed by me, the ED Provider: yes    Patient location:  ED  Previous ECG:     Previous ECG:  Compared to current    Similarity:  No change  Interpretation:     Interpretation: normal    Rate:     ECG rate assessment: normal    Rhythm:     Rhythm: sinus rhythm    Ectopy:     Ectopy: none    QRS:     QRS axis:  Normal    QRS intervals:  Normal  Conduction:     Conduction: normal    ST segments:     ST segments:  Normal  T waves:     T waves: normal               ED Course  ED Course as of 05/19/22 1501   Thu May 19, 2022   1105 CT unremarkable, PT and OT have evaluated the patient recommend home care which case management will arrange  [address] troponin unremarkable   1142 Patient notes improvement of his symptoms after breathing treatment, will give Decadron and antibiotics for COPD exacerbation, will discharge with home care  SBIRT 22yo+    Flowsheet Row Most Recent Value   SBIRT (25 yo +)    In order to provide better care to our patients, we are screening all of our patients for alcohol and drug use  Would it be okay to ask you these screening questions? Yes Filed at: 05/19/2022 0277   Initial Alcohol Screen: US AUDIT-C     1  How often do you have a drink containing alcohol? 0 Filed at: 05/19/2022 0832   2  How many drinks containing alcohol do you have on a typical day you are drinking? 0 Filed at: 05/19/2022 0832   3a  Male UNDER 65: How often do you have five or more drinks on one occasion? 0 Filed at: 05/19/2022 0832   3b  FEMALE Any Age, or MALE 65+: How often do you have 4 or more drinks on one occassion? 0 Filed at: 05/19/2022 2125   Audit-C Score 0 Filed at: 05/19/2022 8583   EVAN: How many times in the past year have you    Used an illegal drug or used a prescription medication for non-medical reasons?  Never Filed at: 05/19/2022 2155                    MDM  Number of Diagnoses or Management Options  Diagnosis management comments: Patient is a 58-year-old male past medical history of liver cancer in 2014 status post radiation, COPD on 3 L home oxygen, CAD, hyperlipidemia, hypertension, diverticulitis, anemia presenting with shortness of breath, decompensation  Patient is well-appearing bedside with stable vitals and in no acute distress  He has mild tenderness most so surrounding the surgical site the abdomen however with no significant distension, no guarding and no other significant physical exam findings to the abdomen  Calves are without edema, mildly tender palpation but with intact strength and range of motion  No skin changes  Patient has lungs clear to auscultation with no conversational dyspnea, no accessory muscle use no retractions  Will give breathing treatments and reassess for wheezing with increased air movement however have low suspicion for COPD exacerbation at this time  Will obtain cardiac workup, CT abdomen pelvis to assess for any intra-abdominal pathology post surgery and if patient states that he does not believe he can take care of himself at home will consult care management      Disposition  Final diagnoses:   COPD exacerbation ([facility] )   Clinical decompensation     Time reflects when diagnosis was documented in both MDM as applicable and the Disposition within this note     Time User Action Codes Description Comment    5/19/2022 11:45 AM Myah Trevino [J44 1] COPD exacerbation ([facility] )     5/19/2022 11:45 AM Myah Trevino [R68 89] Clinical decompensation       ED Disposition     ED Disposition   Discharge    Condition   Stable    Date/Time   Thu May 19, 2022  1:44 PM    Comment   Piper Peter discharge to home/self care                 Follow-up Information     Follow up With Specialties Details Why Contact Info    Herminia Hurtado MD  In 1 week  [address] [place] [id number] [address] Follow up  [place] [address]  [phone]            Patient's Medications   Discharge Prescriptions    DOXYCYCLINE HYCLATE (VIBRAMYCIN) 100 MG CAPSULE    Take 1 capsule (100 mg total) by mouth in the morning and 1 capsule (100 mg total) in the evening  Do all this for 5 days  Start Date: 5/19/2022 End Date: 5/24/2022       Order Dose: 100 mg       Quantity: 10 capsule    Refills: 0       No discharge procedures on file      PDMP Review       Value Time User    PDMP Reviewed  Yes 5/17/2022  3:11 PM Nell Carrington PA-C          ED Provider  Electronically Signed by           Coral Yin DO  05/19/22 6656

## 2022-05-19 NOTE — PLAN OF CARE
Problem: OCCUPATIONAL THERAPY ADULT  Goal: Performs self-care activities at highest level of function for planned discharge setting  See evaluation for individualized goals  Description: Treatment Interventions: ADL retraining, Endurance training, Patient/family training, Equipment evaluation/education, Compensatory technique education, Continued evaluation, Energy conservation, Activityengagement          See flowsheet documentation for full assessment, interventions and recommendations  Note: Limitation: Decreased ADL status, Decreased UE strength, Decreased Safe judgement during ADL, Decreased endurance, Decreased self-care trans, Decreased high-level ADLs  Prognosis: Good  Assessment: Patient is a 62 y o  male seen for OT evaluation s/p admit to [address] [place] [address] on 5/19/2022 w/<principal problem not specified>  Commorbidities affecting patient's functional performance at time of assessment include:diverticulitis, COPD, HTN, liver cancer, coronary artery disease, iron deficiency anemia  Orders placed for OT evaluation and treatment  Performed at least two patient identifiers during session including name and wristband  Prior to admission, Patient reported independnet with ADLs/ IADLs, requiring increased assistance post recent surgery  At baseline ambulates with a cane, does not drive and is currently unemployed  Patient lives with his mother in a one story house, [address] (threshold step) with laundry in the basemant  Personal factors affecting patient at time of initial evaluation include: limited caregiver support, decreased initiation and engagement and difficulty performing ADLs  Upon evaluation, patient requires modified independent assist for UB ADLs, minimal  assist for LB ADLs    Occupational performance is affected by the following deficits: dynamic sit/ stand balance deficit with poor standing tolerance time for self care and functional mobility, decreased activity tolerance and increased pain   Patient to benefit from continued Occupational Therapy treatment while in the hospital to address deficits as defined above and maximize level of functional independence with ADLs and functional mobility  Occupational Performance areas to address include: bathing/ shower, dressing, transfer to all surfaces, functional mobility, emergency response, health maintenance, IADLs: safety procedures and Leisure Participation  From OT standpoint, recommendation at time of d/c would be Home with family support, [address] and Home OT       OT Discharge Recommendation: Home with home health rehabilitation

## 2022-05-19 NOTE — CASE MANAGEMENT
Case Management Assessment & Discharge Planning Note    Patient name Becky Hitchcock  Location ED  MRN [id number]  : 1960 Date 2022       Current Admission Date: 2022  Current Admission Diagnosis:Abdominal pain   Patient Active Problem List    Diagnosis Date Noted    Coronary artery disease 2022    Iron deficiency anemia 2022    Liver cancer ([facility] ) 2022    Abnormal CT scan 2021    Diverticulitis of large intestine without perforation or abscess without bleeding 2021    COPD (chronic obstructive pulmonary disease) ([facility] ) 2021    Primary hypertension 2021      LOS (days): 0  Geometric Mean LOS (GMLOS) (days):   Days to GMLOS:     OBJECTIVE:     Current admission status: Emergency       Preferred Pharmacy:   [address], [address]  Phone: [phone] Fax: [phone]    Primary Care Provider: Rio Bowser MD    Primary Insurance: [place] MADHU  Secondary Insurance:     ASSESSMENT:  [facility] Proxies     alysia, [facility]   Primary Phone: [phone] (Mobile)               Advance Directives  Does patient have a [address]?: No  Was patient offered paperwork?: Yes  Does patient currently have a Health Care decision maker?: Yes, please see Health Care Proxy section  Does patient have Advance Directives?: No  Was patient offered paperwork?: Yes  Primary Contact: Patient's Sister    Readmission Root Cause  30 Day Readmission: No    Patient Information  Admitted from[de-identified] Home  Mental Status: Alert  During Assessment patient was accompanied by: Not accompanied during assessment  Assessment information provided by[de-identified] Parent  Primary Caregiver: Self  Support Systems: Family members, Parent  [place] of Residence: [place] [address] do you live in?: [address] [place] [address] entry access options   Select all that apply : Stairs  Number of steps to enter home : 2  Do the steps have railings?: No  Type of Current Residence:  [place]  In the last 12 months, was there a time when you were not able to pay the mortgage or rent on time?: No  In the last 12 months, how many places have you lived?: 1  In the last 12 months, was there a time when you did not have a steady place to sleep or slept in a shelter (including now)?: No  Homeless/housing insecurity resource given?: N/A  Living Arrangements: Lives w/ Parent(s)  Is patient a ?: No    Activities of Daily Living Prior to Admission  Functional Status: Independent  Completes ADLs independently?: Yes  Ambulates independently?: Yes  Does patient use assisted devices?: No  Does patient currently own DME?: No (Patient's mother reported that patient uses the walls for support since his recent surgery )  Does patient have a history of Outpatient Therapy (PT/OT)?: No  Does the patient have a history of Short-Term Rehab?: No  Does patient have a history of HHC?: No  Does patient currently have [employer] ?: No    Patient Information Continued  Income Source: SSI/SSD  Does patient have prescription coverage?: Yes  Within the past 12 months, you worried that your food would run out before you got the money to buy more : Never true  Within the past 12 months, the food you bought just didn't last and you didn't have money to get more : Never true  Food insecurity resource given?: N/A  Does patient receive dialysis treatments?: No  Does patient have a history of substance abuse?: No  Does patient have a history of Mental Health Diagnosis?: Yes (depression)  Is patient receiving treatment for mental health?: Yes (Patient sees a therapist through [id number] )  Has patient received inpatient treatment related to mental health in the last 2 years?: No    Means of Transportation  Means of Transport to Appts[de-identified] Family transport  In the past 12 months, has lack of transportation kept you from medical appointments or from getting medications?: No  In the past 12 months, has lack of transportation kept you from meetings, work, or from getting things needed for daily living?: No  Was application for public transport provided?: N/A    DISCHARGE DETAILS:    Discharge planning discussed with[de-identified] Patient's Mother  Freedom of Choice: Yes  Comments - Freedom of Choice: CM discussed freedom of choice as it pertains to discharge planning  Patient's mother reported that she is unsure of what patient will needs at discharge  It depends on the PT/OT recs  CM made several attempts to contact patient, but no response  CM to revisit patient after PT/OT recs have been made    CM contacted family/caregiver?: Yes  Were Treatment Team discharge recommendations reviewed with patient/caregiver?: Yes  Did patient/caregiver verbalize understanding of patient care needs?: Yes  Were patient/caregiver advised of the risks associated with not following Treatment Team discharge recommendations?: Yes    DME Referral Provided  Referral made for DME?: No    Other Referral/Resources/Interventions Provided:  Government Services[de-identified] Legal Services (Patient's mother reported that patient was recently incarcerated for 3 weeks for retail theft )

## 2022-05-19 NOTE — PHYSICAL THERAPY NOTE
Physical Therapy Evaluation   Time in: 932  Time out: 958  Total evaluation time: 26 minutes    Patient's Name: Cathy Prudent    Admitting Diagnosis  Abdominal pain [R10 9]    Problem List  Patient Active Problem List   Diagnosis    Diverticulitis of large intestine without perforation or abscess without bleeding    COPD (chronic obstructive pulmonary disease) ([facility] )    Primary hypertension    Abnormal CT scan    Liver cancer (HCC)    Coronary artery disease    Iron deficiency anemia       Past Medical History  Past Medical History:   Diagnosis Date    Angina pectoris ([facility] )     Asthma     Cancer ([place] [address] )     liver    Colon polyp     Coronary artery disease     Heart attack ([place] [address] )     Hyperlipidemia     Hypertension     Liver disease     Shortness of breath        Past Surgical History  Past Surgical History:   Procedure Laterality Date    BACK SURGERY      CHOLECYSTECTOMY      COLONOSCOPY      CORONARY ANGIOPLASTY WITH STENT PLACEMENT      HERNIA REPAIR      HERNIA REPAIR Left 5/17/2022    Procedure: laparoscopic attempted converted to open LEFT inguinal hernia repair with mesh;  Surgeon: Kate Slater MD;  Location: [facility];  Service: General    INGUINAL HERNIA REPAIR Left 05/17/2022    laparoscopic attempted converted to open L inguinal hernia repair w/mesh, Dr Justina Zavaleta         PT performed at least 2 patient identifiers during session: Name and wristband         05/19/22 0937   PT Last Visit   PT Visit Date 05/19/22   Note Type   Note type Evaluation   Pain Assessment   Pain Assessment Tool 0-10   Pain Score 5   Pain Location/Orientation Location: Abdomen   Pain Onset/Description Onset: Ongoing   Patient's Stated Pain Goal No pain   Restrictions/Precautions   Weight Bearing Precautions Per Order No   Braces or Orthoses Other (Comment)  (none at baseline)   Other Precautions Bed Alarm;O2;Fall Risk;Pain;Contact/isolation  (Bed Alarm;O2;Fall Risk;Pain;)   Home Living Type of Home House  (1 story rancher)   Home Layout One level; Laundry in basement  (1 [address]A[facility]   Bathroom Shower/Tub Tub/shower unit   [employer] Grab bars in shower; Shower chair;Hand-held shower   [address]  (pt ambulatory with cane at baseline)   Prior Function   Level of [place] Independent with ADLs and functional mobility   Lives With Family  (mother)   Receives Help From Family  (mother has 24/7 aide)   ADL Assistance Independent   IADLs Independent   Falls in the last 6 months 1 to 4  (2 falls, 1 recent fall PTA)   Vocational Other (Comment)  (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)   Comments patient does not drive   General   Family/Caregiver Present No   Cognition   Overall Cognitive Status WFL   Arousal/Participation Alert   Orientation Level Oriented X4   Memory Within functional limits   Following Commands Follows all commands and directions without difficulty   Comments pt agreeable to PT eval   RLE Assessment   RLE Assessment   (grossly 4/5)   LLE Assessment   LLE Assessment   (grossly 4/5)   Coordination   Movements are Fluid and Coordinated 1   Sensation X   Light Touch   RLE Light Touch Impaired   LLE Light Touch Impaired   Bed Mobility   Supine to Sit 5  Supervision   Additional items Assist x 1; Increased time required;HOB elevated;Verbal cues   Sit to Supine 5  Supervision   Additional items Assist x 1; Increased time required;HOB elevated;Verbal cues   Additional Comments education for log roll technique   Transfers   Sit to Stand 5  Supervision   Additional items Assist x 1; Increased time required;Verbal cues   Stand to Sit 5  Supervision   Additional items Assist x 1; Increased time required;Verbal cues   Ambulation/Elevation   Gait pattern Decreased foot clearance; Short stride; Foward flexed  (no LOB)   Gait Assistance 5  Supervision   Additional items Assist x 1;Verbal cues   Assistive Device Rolling walker   Distance 30'   Stair Management Assistance   (static marches without LOB)   Balance   Static Sitting Good   Dynamic Sitting Fair +   Static Standing Fair   Dynamic Standing Elisa Donaldson [id number]  (with RW)   Endurance Deficit   Endurance Deficit Yes   Activity Tolerance   Activity Tolerance Patient limited by Navi Carry Dr Rosa Abdullahi   Nurse Made Aware RN Tatyana Bahena   Assessment   Prognosis Good   Problem List Decreased strength;Decreased endurance; Impaired balance;Decreased mobility;Pain   Assessment Pt is 62 y o  male seen for high-complexity PT evaluation on 5/19/2022 s/p admit to [facility] on 5/19/2022 s/p fall, recently d/c'ed from hospital 2 days ago s/p inguinal hernia repair  PT was consulted to assess pt's functional mobility and d/c needs  Order placed for PT eval and tx  PTA, pt resides with mother in [facility] with 1 SAMANTHA, ambulates with cane at baseline, +fall history, I with I/ADLs  At time of eval, pt requiring SBA for all phases of mobility, no LOB observed  Upon evaluation, pt presenting with impaired functional mobility d/t decreased strength, decreased endurance, impaired balance, decreased mobility, impaired sensation, decreased skin integrity and activity intolerance  Pertinent PMHx and current co-morbidities affecting pt's physical performance at time of assessment include: diverticulitis, COPD, HTN, liver cancer, coronary artery disease, iron deficiency anemia  Personal factors affecting pt at time of eval include: ambulating w/ assistive device, inability to navigate community distances and positive fall history  The following objective measures performed on IE also reveal limitations: Barthel Index: 50/100, Modified [place]: 2 (slight disability), Tinetti/TU: 23/28 (moderate risk for falls) and AM-PAC 6-Clicks: 50/63   Pt's clinical presentation is currently unstable/unpredictable seen in pt's presentation of abnormal lab value(s), need for input for task focus and mobility technique and ongoing medical assessment  Overall, pt's rehab potential and prognosis to return to PLOF is good as impacted by objective findings, warranting pt to receive further skilled PT interventions to address identified impairments, activity limitation(s), and participation restriction(s)  Pt to benefit from continued PT tx to address deficits as defined above and maximize level of functional independent mobility and consistency  From PT/mobility standpoint, recommendation at time of d/c would be home with home health rehabilitation pending progress in order to facilitate return to PLOF  Barriers to Discharge Inaccessible home environment;Decreased caregiver support   Goals   Patient Goals none expressed   STG Expiration Date 05/29/22   Short Term Goal #1 In 7-10 days: Increase bilateral LE strength 1/2 grade to facilitate independent mobility, Perform all bed mobility tasks modified independent to decrease caregiver burden, Perform all transfers modified independent to improve independence, Ambulate > 150 ft  with least restrictive assistive device modified independent w/o LOB and w/ normalized gait pattern 100% of the time, Navigate 1 stair(s) modified independent without handrail to facilitate return to previous living environment via threshold into entry doorway, Increase all balance 1/2 grade to decrease risk for falls, Improve Barthel Index score to 65 or greater to facilitate independence and PT provider will perform functional balance assessment to determine fall risk   PT Treatment Day 0   Plan   Treatment/Interventions Functional transfer training;LE strengthening/ROM; Therapeutic exercise; Endurance training;Patient/family training;Equipment eval/education; Bed mobility;Gait training;Spoke to nursing   PT Frequency 2-3x/wk   Recommendation   PT Discharge Recommendation Home with home health rehabilitation   Equipment Recommended Mat Prader (RW)   Magazino Recommended Wheeled walker   Change/add to [employer]?  No   AM-PAC Basic Mobility Inpatient   Turning in Bed Without Bedrails 3   Lying on Back to Sitting on Edge of Flat Bed 3   Moving Bed to Chair 3   Standing Up From Chair 3   Walk in Room 3   Climb 3-5 Stairs 3   Basic Mobility Inpatient Raw Score 18   Basic Mobility Standardized Score 41 05   Highest Level Of Mobility   -[facility] Goal 6: Walk 10 steps or more   -HL Achieved 7: Walk 25 feet or more   Modified Attala Scale   Modified Eduardo Scale 2   Barthel Index   Feeding 10   Bathing 0   Grooming Score 0   Dressing Score 5   Bladder Score 10   Bowels Score 10   Toilet Use Score 5   Transfers (Bed/Chair) Score 10   Mobility (Level Surface) Score 0   Stairs Score 0   Barthel Index Score 50   Tinetti   Sitting Balance 0   Arises 2   Attempts to Arise 2   Immediate Standing Balance (First 5 Seconds) 2   Standing Balance 2   Nudged 2   Eyes Closed 0   Turned 360 Degrees: Steadiness 1   Turned 360 Degrees: Continuity of Steps 1   Sitting Down 2   Balance Score 14   Initiation of Gait 1   Step Height: R Swing Foot 1   Step Length: R Swing Foot 1   Step Height: L Swing Foot 1   Step Length: L Swing Foot 1   Step Symmetry 1   Step Continuity 1   Path 1   Trunk 0   Walking Time 1   Gait Score 9   Total Score 23       Ricardo Hart, PT, DPT

## 2022-05-19 NOTE — PLAN OF CARE
Problem: PHYSICAL THERAPY ADULT  Goal: Performs mobility at highest level of function for planned discharge setting  See evaluation for individualized goals  Description: Treatment/Interventions: Functional transfer training, LE strengthening/ROM, Therapeutic exercise, Endurance training, Patient/family training, Equipment eval/education, Bed mobility, Gait training, Spoke to nursing  Equipment Recommended: Sariah Callahan (RW)       See flowsheet documentation for full assessment, interventions and recommendations  Note: Prognosis: Good  Problem List: Decreased strength, Decreased endurance, Impaired balance, Decreased mobility, Pain  Assessment: Pt is 62 y o  male seen for high-complexity PT evaluation on 5/19/2022 s/p admit to [facility] on 5/19/2022 s/p fall, recently d/c'ed from hospital 2 days ago s/p inguinal hernia repair  PT was consulted to assess pt's functional mobility and d/c needs  Order placed for PT eval and tx  PTA, pt resides with mother in [facility] with 1 SAMANTHA, ambulates with cane at baseline, +fall history, I with I/ADLs  At time of eval, pt requiring SBA for all phases of mobility, no LOB observed  Upon evaluation, pt presenting with impaired functional mobility d/t decreased strength, decreased endurance, impaired balance, decreased mobility, impaired sensation, decreased skin integrity and activity intolerance  Pertinent PMHx and current co-morbidities affecting pt's physical performance at time of assessment include: diverticulitis, COPD, HTN, liver cancer, coronary artery disease, iron deficiency anemia  Personal factors affecting pt at time of eval include: ambulating w/ assistive device, inability to navigate community distances and positive fall history  The following objective measures performed on IE also reveal limitations: Barthel Index: 50/100, Modified [place]: 2 (slight disability), Tinetti/TU: 23/28 (moderate risk for falls) and AM-PAC 6-Clicks: 53/30   Pt's clinical presentation is currently unstable/unpredictable seen in pt's presentation of abnormal lab value(s), need for input for task focus and mobility technique and ongoing medical assessment  Overall, pt's rehab potential and prognosis to return to PLOF is good as impacted by objective findings, warranting pt to receive further skilled PT interventions to address identified impairments, activity limitation(s), and participation restriction(s)  Pt to benefit from continued PT tx to address deficits as defined above and maximize level of functional independent mobility and consistency  From PT/mobility standpoint, recommendation at time of d/c would be home with home health rehabilitation pending progress in order to facilitate return to PLOF  Barriers to Discharge: Inaccessible home environment, Decreased caregiver support        PT Discharge Recommendation: Home with home health rehabilitation          See flowsheet documentation for full assessment

## 2022-05-19 NOTE — CASE MANAGEMENT
Case Management Discharge Planning Note    Patient name Adela Zacarias  Location ED  MRN [id number]  : 1960 Date 2022       Current Admission Date: 2022  Current Admission Diagnosis:Abdominal pain   Patient Active Problem List    Diagnosis Date Noted    Coronary artery disease 2022    Iron deficiency anemia 2022    Liver cancer ([facility] ) 2022    Abnormal CT scan 2021    Diverticulitis of large intestine without perforation or abscess without bleeding 2021    COPD (chronic obstructive pulmonary disease) ([facility] ) 2021    Primary hypertension 2021      LOS (days): 0  Geometric Mean LOS (GMLOS) (days):   Days to GMLOS:     OBJECTIVE:            Current admission status: Emergency   Preferred Pharmacy:   [facility] [address], [address]  Phone: [phone] Fax: [phone]    Primary Care Provider: Emelina Lowe MD    Primary Insurance: Ladan VELASQUEZ  Secondary Insurance:     DISCHARGE DETAILS:    Discharge planning discussed with[de-identified] Patient  Freedom of Choice: Yes  Comments - Freedom of Choice: CM met with patient at bedside to discuss [facility] and walker recommendation  Patient reported that the walker in the room will be going home with him  He also reported that he is agreeable with [facility] and denied any preferences as to which agency he wishes to use    CM contacted family/caregiver?: No- see comments (Patient declined phone call at this time )  Were Treatment Team discharge recommendations reviewed with patient/caregiver?: Yes  Did patient/caregiver verbalize understanding of patient care needs?: Yes  Were patient/caregiver advised of the risks associated with not following Treatment Team discharge recommendations?: Yes    [address]         Is the patient interested in [facility] at discharge?: Yes  [address] requested[de-identified] Physical Therapy, Occupational Therapy, Nursing, Leonides Morales Agency Name[de-identified] [address] Provider[de-identified] PCP  Home Health Services Needed[de-identified] Evaluate Functional Status and Safety, Strengthening/Theraputic Exercises to Improve Function, Gait/ADL Training, COPD Management  Homebound Criteria Met[de-identified] Requires the Assistance of Another Person for Safe Ambulation or to Leave the Home, Uses an Assist Device (i e  cane, walker, etc)  Supporting Clincal Findings[de-identified] Fatigues Easliy in [employer], Limited Endurance    Other Referral/Resources/Interventions Provided:  Interventions: [facility]  Referral Comments: CM reviewed ecin and found that [place] [id number] is the only accepting agency  SOC is scheduled for Saturday 5/21  CM updated AVS and faxed to [facility]

## 2022-05-23 NOTE — OP NOTE
OPERATIVE REPORT  PATIENT NAME: Piper Peter    :    MRN: [id number]  Pt Location: [facility] OR ROOM 03    SURGERY DATE: 2022    Surgeon(s) and Role:     * Lindsey Escobar MD - Primary     * Nell Carrington PA-C - Assisting    Preop Diagnosis:  Left inguinal hernia [K40 90]    Post-Op Diagnosis Codes:     * Left inguinal hernia [K40 90] - recurrent     Procedure(s) (LRB):  laparoscopic attempted converted to open LEFT inguinal hernia repair with mesh (Left)    Specimen(s):  ID Type Source Tests Collected by Time Destination   1 : Hernia Sac and Cord Lipoma Tissue Hernia Sac, Left Inguinal TISSUE EXAM Lindsey Escobar MD 2022 1219        Estimated Blood Loss:   Minimal    Drains:  * No LDAs found *    Anesthesia Type:   General    Operative Indications:  Left inguinal hernia [K40 90]    Operative Findings:  tacs or suture material seen in the left preperitoneal space consistent with prior hernia repair indicating this is likely a recurrence (he reported prior RIH repair)    Complications:   None    Procedure and Technique:    The patient was identified he was placed in the operating table in a supine position  After adequate anesthesia induction and satisfactory endotracheal intubation the abdomen and perineum were prepped and draped in sterile usual fashion with chlor prep  Timeout was called the patient was identified as well as surgical site  An infra umbilical incision was made with a scalpel, taken down through the subcutaneous tissue with electrocautery  The fascia of the rectus muscle was opened with electrocautery in a transverse fashion  The rectus muscle was retracted laterally and with blunt finger dissection a space was created posterior to the rectus muscle  The balloon dissector was inserted into the preperitoneal space and insufflated under direct vision  The balloon was deflated and subsequently retrieved   The structural balloon was placed into the preperitoneal space and insufflated  The preperitoneal space was insufflated with CO2 but would not achieve the goal pressure  The scope was advanced and there was purple suture or tack material seen in the preperitoneal space preventing adequate dissection  The procedure was then converted to open  The pubic tubercle and ASIS were marked and a 6 cm walt was made along that line  A mixture of 0 25% Marcaine and 1% lidocaine was used to anesthetize the skin and incision was made with a 15 blade  Dissection was carried through the soft tissue including Campers and Scarpas and the superficial epigastric vein was ligated using hemostats and 2-0 vicryl ties  This was then carried down to expose the inguinal canal and inguinal ligament along its lower edge  The external oblique fascia was split along the course of its fibers, exposing the inguinal canal  The cord and nerve were looped using a Penrose drain and reflected out of the field  The sac was identified and opened and there was colon within the hernia sac and part of the wall consistent with a sliding indirect hernia  This was dissected back to the preperitoneal fat and the contents were stick tied with 2-0 vicryl and amputated   The inguinal canal was irrigated  The defect was exposed and a piece of 7 5 x 15 bard soft polypropylene mesh was trimmed to size and placed over the defect  2-0 Prolene suture was then used in an interrupted fashion to place the mesh with the suture being sewn from the pubic tubercle inferiorly and superiorly along the canal to a level just beyond the internal ring  The mesh was split to allow passage of the cord and nerve into the canal without entrapment  The contents were then returned to canal and the external oblique fascia was then closed in a continuous fashion using 2-0 Vicryl suture taking care not to cause entrapment  Scarpas was closed with interrupted 3-0 vicryl and deep dermals were placed   A 4-0 monocryl was used to run a subcuticular skin suture and covered with histoacryl  Instrument, sponge, and needle counts were correct prior to closure and at the conclusion of the case     I was present for the entire procedure, A qualified resident physician was not available and A physician assistant was required during the procedure for retraction tissue handling,dissection and suturing    Patient Disposition:  PACU  and extubated and stable      SIGNATURE: Memo Metzger MD  DATE: May 22, 2022  TIME: 10:55 PM

## 2022-05-31 ENCOUNTER — TELEPHONE (OUTPATIENT)
Dept: SURGERY | Facility: CLINIC | Age: 62
End: 2022-05-31

## 2022-05-31 NOTE — TELEPHONE ENCOUNTER
Pt lm stating his vna told him he has an infection in the wound, pt has a post op 6/1        Lm for pt to call back to access symptoms

## 2022-05-31 NOTE — TELEPHONE ENCOUNTER
Patient states he is in terrible pain, unable to cover area due to pain, states puss came out when the nurse was there from [facility]  Nurse also stated the area is Raised, hard and red, no fever  Patient states he would like to wait until his appointment tomorrow rather than go to the ED today

## 2022-06-01 ENCOUNTER — OFFICE VISIT (OUTPATIENT)
Dept: SURGERY | Facility: CLINIC | Age: 62
End: 2022-06-01

## 2022-06-01 VITALS
WEIGHT: 180 LBS | OXYGEN SATURATION: 100 % | DIASTOLIC BLOOD PRESSURE: 88 MMHG | TEMPERATURE: 98.9 F | SYSTOLIC BLOOD PRESSURE: 120 MMHG | HEIGHT: 67 IN | BODY MASS INDEX: 28.25 KG/M2 | HEART RATE: 97 BPM | RESPIRATION RATE: 16 BRPM

## 2022-06-01 DIAGNOSIS — Z87.19 S/P INGUINAL HERNIA REPAIR: ICD-10-CM

## 2022-06-01 DIAGNOSIS — L03.90 CELLULITIS: Primary | ICD-10-CM

## 2022-06-01 DIAGNOSIS — Z98.890 S/P INGUINAL HERNIA REPAIR: ICD-10-CM

## 2022-06-01 PROCEDURE — 99024 POSTOP FOLLOW-UP VISIT: CPT | Performed by: SURGERY

## 2022-06-01 RX ORDER — CLINDAMYCIN HYDROCHLORIDE 300 MG/1
300 CAPSULE ORAL 3 TIMES DAILY
Qty: 15 CAPSULE | Refills: 0 | Status: SHIPPED | OUTPATIENT
Start: 2022-06-01 | End: 2022-06-06

## 2022-06-01 RX ORDER — IBUPROFEN 800 MG/1
800 TABLET ORAL EVERY 8 HOURS SCHEDULED
Qty: 30 TABLET | Refills: 0 | Status: SHIPPED | OUTPATIENT
Start: 2022-06-01 | End: 2022-06-01

## 2022-06-20 NOTE — PROGRESS NOTES
Assessment/Plan:     1  Cellulitis  -     clindamycin (CLEOCIN) 300 MG capsule; Take 1 capsule (300 mg total) by mouth 3 (three) times a day for 5 days    2  S/P inguinal hernia repair  -     traMADol-acetaminophen (ULTRACET) 37 5-325 mg per tablet; Take 1 tablet by mouth every 8 (eight) hours as needed for moderate pain      Complete 5 day course of abx for cellulitis of wound  F/u in 2 weeks for recheck  New rx given for discomfort related to infection/inflammation and swelling of the groin  Subjective:      Patient ID: Linda Ocampo is a 62 y o  male  Triage Notes:    Mr Gumaro Kc is following up after [facility] repair  He had an attempted lap repair but although he reported a h/o of RIH repair he had evidence of prior surgery on the left as well and it was converted to open  He reports having ongoing swelling, pain and redness around the wound  The following portions of the patient's history were reviewed and updated as appropriate: allergies, current medications, past family history, past medical history, past social history, past surgical history and problem list     Review of Systems      Objective:      /88   Pulse 97   Temp 98 9 °F (37 2 °C) (Oral)   Resp 16   Ht 5' 7" (1 702 m)   Wt 81 6 kg (180 lb)   SpO2 100%   BMI 28 19 kg/m²     Below is the patient's most recent value for Albumin, ALT, AST, BUN, Calcium, Chloride, Cholesterol, CO2, Creatinine, GFR, Glucose, HDL, Hematocrit, Hemoglobin, Hemoglobin A1C, LDL, Magnesium, Phosphorus, Platelets, Potassium, PSA, Sodium, Triglycerides, and WBC     Lab Results   Component Value Date    ALT 19 05/19/2022    AST 19 05/19/2022    BUN 17 05/19/2022    CALCIUM 8 7 05/19/2022    CL 99 (L) 05/19/2022    CO2 29 05/19/2022    CREATININE 0 79 05/19/2022    HCT 32 7 (L) 05/19/2022    HGB 10 4 (L) 05/19/2022    HGBA1C 5 5 06/24/2020    MG 1 9 05/19/2022     05/19/2022    K 3 9 05/19/2022    WBC 14 03 (H) 05/19/2022     Note: for a comprehensive list of the patient's lab results, access the Results Review activity  Physical Exam  Constitutional:       General: He is not in acute distress  Appearance: He is not ill-appearing, toxic-appearing or diaphoretic  Comments: Moving slowly   HENT:      Head: Normocephalic and atraumatic  Mouth/Throat:      Mouth: Mucous membranes are moist    Eyes:      General: No scleral icterus  Abdominal:      General: There is no distension  Genitourinary:     Comments: Groin incision with +blanching erythema and mild edema   Neurological:      Mental Status: He is alert  Psychiatric:         Mood and Affect: Mood normal          Behavior: Behavior normal          Thought Content:  Thought content normal          Judgment: Judgment normal              Procedures

## 2022-07-05 NOTE — ED PROVIDER NOTES
History  Chief Complaint   Patient presents with    Abdominal Pain     Pt c/o LLQ abdominal pain and palpitations that woke him from sleep     Nahid Mcneil is a 65 y o  male with a past medical history of hypertension, hyperlipidemia, presenting today with abdominal pain  Patient reports that they have been experiencing left-sided groin pain  Evaluated in the ED 3 days ago for inguinal hernia hernia was able to be reduced he was discharged  The patient returns today with hernia popping multiple times since previous discharge getting incarcerated intermittently  Patient with consistent pain here in the emergency department  Patient to be admitted to surgery  Prior to Admission Medications   Prescriptions Last Dose Informant Patient Reported? Taking? Probiotic Product (Bacid) TABS 5/15/2022 at Unknown time Self Yes Yes   Sig: Take 1 tablet by mouth in the morning and 1 tablet in the evening  albuterol (2 5 mg/3 mL) 0 083 % nebulizer solution 5/15/2022 at Unknown time Self Yes Yes   Sig: Take 2 5 mg by nebulization every 6 (six) hours as needed for wheezing or shortness of breath   atorvastatin (LIPITOR) 40 mg tablet 5/15/2022 at Unknown time Self Yes Yes   Sig: Take 40 mg by mouth in the morning  dicyclomine (BENTYL) 10 mg capsule 5/15/2022 at Unknown time Self Yes Yes   Sig: Take 10 mg by mouth in the morning and 10 mg at noon and 10 mg in the evening  lisinopril (ZESTRIL) 10 mg tablet 5/15/2022 at Unknown time Self Yes Yes   Sig: Take 10 mg by mouth in the morning     metoprolol tartrate (LOPRESSOR) 25 mg tablet 5/15/2022 at Unknown time Self Yes Yes   Sig: Take 25 mg by mouth every 12 (twelve) hours   nitroglycerin (NITROSTAT) 0 3 mg SL tablet 5/15/2022 at Unknown time Self Yes Yes   Sig: Place 0 3 mg under the tongue every 5 (five) minutes as needed for chest pain   omeprazole (PriLOSEC) 20 mg delayed release capsule 5/15/2022 at Unknown time Self Yes Yes   Sig: Take 20 mg by mouth in the morning  ondansetron (ZOFRAN-ODT) 4 mg disintegrating tablet 5/15/2022 at Unknown time Self Yes Yes   Sig: Take 4 mg by mouth   oxyCODONE-acetaminophen (PERCOCET) 5-325 mg per tablet 5/15/2022 at Unknown time Self Yes Yes   Sig: TAKE 1 TAB AS NEEDED EVERY 6 HOURS DAY 1, 1 TAB EVERY 12 HOURS DAYS 2 1 TAB ONCE ON DAY 3    raNITIdine HCl (ZANTAC 75 PO) 5/15/2022 at Unknown time Self Yes Yes   Sig: Take by mouth      Facility-Administered Medications: None       Past Medical History:   Diagnosis Date    [facility]     Asthma     Cancer [facility]     liver    Colon polyp     Coronary artery disease     Heart attack [facility]     Hyperlipidemia     Hypertension     Liver disease     Shortness of breath        Past Surgical History:   Procedure Laterality Date    BACK SURGERY      CHOLECYSTECTOMY      COLONOSCOPY      CORONARY ANGIOPLASTY WITH STENT PLACEMENT      HERNIA REPAIR      HERNIA REPAIR Left 5/17/2022    Procedure: laparoscopic attempted converted to open LEFT inguinal hernia repair with mesh;  Surgeon: Elizabeth Mishra MD;  Location: [facility];  Service: General    INGUINAL HERNIA REPAIR Left 05/17/2022    laparoscopic attempted converted to open L inguinal hernia repair w/mesh, Dr Marivel Lawrence         History reviewed  No pertinent family history  I have reviewed and agree with the history as documented  E-Cigarette/Vaping    E-Cigarette Use Never User      E-Cigarette/Vaping Substances    Nicotine No     THC No     CBD No     Flavoring No     Other No     Unknown No      Social History     Tobacco Use    Smoking status: Never Smoker    Smokeless tobacco: Never Used   Vaping Use    Vaping Use: Never used   Substance Use Topics    Alcohol use: Never    Drug use: Never       Review of Systems   Constitutional: Negative for activity change, chills, diaphoresis and fever     HENT: Negative for congestion, ear discharge, ear pain, rhinorrhea, sore throat and trouble swallowing  Eyes: Negative for pain, discharge, redness and visual disturbance  Respiratory: Negative for cough, chest tightness, shortness of breath and wheezing  Cardiovascular: Negative for chest pain, palpitations and leg swelling  Gastrointestinal: Positive for abdominal pain  Negative for abdominal distention, blood in stool, constipation, diarrhea, nausea and vomiting  Genitourinary: Negative for difficulty urinating, dysuria, flank pain, frequency, hematuria and urgency  Musculoskeletal: Negative for arthralgias, myalgias, neck pain and neck stiffness  Skin: Negative for color change and rash  Neurological: Negative for dizziness, syncope, facial asymmetry, weakness, light-headedness, numbness and headaches  Physical Exam  Physical Exam  Vitals reviewed  Constitutional:       General: He is not in acute distress  Appearance: Normal appearance  He is not ill-appearing  HENT:      Head: Normocephalic and atraumatic  Right Ear: Tympanic membrane normal       Left Ear: Tympanic membrane normal       Nose: Nose normal  No congestion or rhinorrhea  Mouth/Throat:      Mouth: Mucous membranes are moist       Pharynx: Oropharynx is clear  No oropharyngeal exudate or posterior oropharyngeal erythema  Eyes:      Extraocular Movements: Extraocular movements intact  Conjunctiva/sclera: Conjunctivae normal       Pupils: Pupils are equal, round, and reactive to light  Cardiovascular:      Rate and Rhythm: Normal rate and regular rhythm  Pulses: Normal pulses  Heart sounds: Normal heart sounds  Pulmonary:      Effort: Pulmonary effort is normal  No respiratory distress  Breath sounds: Normal breath sounds  No wheezing  Abdominal:      General: Abdomen is flat  Bowel sounds are normal  There is no distension  Palpations: Abdomen is soft  There is no mass  Tenderness: There is no abdominal tenderness   There is no right CVA tenderness, left CVA tenderness or guarding  Hernia: No hernia is present  Musculoskeletal:         General: No swelling, tenderness or deformity  Normal range of motion  Cervical back: Normal range of motion and neck supple  No rigidity or tenderness  Right lower leg: No edema  Left lower leg: No edema  Skin:     General: Skin is warm and dry  Capillary Refill: Capillary refill takes less than 2 seconds  Coloration: Skin is not jaundiced  Findings: No erythema or rash  Neurological:      General: No focal deficit present  Mental Status: He is alert and oriented to person, place, and time  Cranial Nerves: No cranial nerve deficit  Motor: No weakness        Gait: Gait normal          Vital Signs  ED Triage Vitals   Temperature Pulse Respirations Blood Pressure SpO2   05/16/22 0455 05/16/22 0453 05/16/22 0453 05/16/22 0453 05/16/22 0454   97 8 °F (36 6 °C) 83 18 111/68 94 %      Temp Source Heart Rate Source Patient Position - Orthostatic VS BP Location FiO2 (%)   05/16/22 0455 05/16/22 0453 05/16/22 0453 05/16/22 0453 --   Oral Monitor Lying Right arm       Pain Score       05/16/22 1025       10 - Worst Possible Pain           Vitals:    05/17/22 1640 05/17/22 1655 05/17/22 1656 05/17/22 1700   BP: 127/97 125/80 125/80 125/80   Pulse: 98 82 89 93   Patient Position - Orthostatic VS:             Visual Acuity      ED Medications  Medications   sodium chloride 0 9 % bolus 1,000 mL (0 mL Intravenous Stopped 5/16/22 0657)   ketorolac (TORADOL) injection 15 mg (15 mg Intravenous Given 5/16/22 1025)   clindamycin (CLEOCIN) IVPB (premix in dextrose) 900 mg 50 mL (900 mg Intravenous New Bag 5/17/22 1042)       Diagnostic Studies  Results Reviewed     Procedure Component Value Units Date/Time    Basic metabolic panel [[id number]]  (Abnormal) Collected: 05/17/22 0452    Lab Status: Final result Specimen: Blood from Arm, Left Updated: 05/17/22 0518     Sodium 138 mmol/L      Potassium 4 0 mmol/L      Chloride 105 mmol/L      CO2 29 mmol/L      ANION GAP 4 mmol/L      BUN 15 mg/dL      Creatinine 0 86 mg/dL      Glucose 106 mg/dL      Glucose, Fasting 106 mg/dL      Calcium 8 7 mg/dL      eGFR 94 ml/min/1 73sq m     Narrative:      National Kidney Disease Foundation guidelines for Chronic Kidney Disease (CKD):     Stage 1 with normal or high GFR (GFR > 90 mL/min/1 73 square meters)    Stage 2 Mild CKD (GFR = 60-89 mL/min/1 73 square meters)    Stage 3A Moderate CKD (GFR = 45-59 mL/min/1 73 square meters)    Stage 3B Moderate CKD (GFR = 30-44 mL/min/1 73 square meters)    Stage 4 Severe CKD (GFR = 15-29 mL/min/1 73 square meters)    Stage 5 End Stage CKD (GFR <15 mL/min/1 73 square meters)  Note: GFR calculation is accurate only with a steady state creatinine    CBC and differential [[id number]]  (Abnormal) Collected: 05/17/22 0452    Lab Status: Final result Specimen: Blood from Arm, Left Updated: 05/17/22 0508     WBC 11 90 Thousand/uL      RBC 5 00 Million/uL      Hemoglobin 9 3 g/dL      Hematocrit 30 2 %      MCV 60 fL      MCH 18 6 pg      MCHC 30 8 g/dL      RDW 17 5 %      MPV 9 1 fL      Platelets 310 Thousands/uL      nRBC 0 /100 WBCs      Neutrophils Relative 76 %      Immat GRANS % 1 %      Lymphocytes Relative 15 %      Monocytes Relative 6 %      Eosinophils Relative 2 %      Basophils Relative 0 %      Neutrophils Absolute 9 11 Thousands/µL      Immature Grans Absolute 0 07 Thousand/uL      Lymphocytes Absolute 1 75 Thousands/µL      Monocytes Absolute 0 73 Thousand/µL      Eosinophils Absolute 0 21 Thousand/µL      Basophils Absolute 0 03 Thousands/µL     HS Troponin I 2hr [[id number]]  (Normal) Collected: 05/16/22 0807    Lab Status: Final result Specimen: Blood from Hand, Right Updated: 05/16/22 0845     hs TnI 2hr 4 ng/L      Delta 2hr hsTnI 0 ng/L     HS Troponin 0hr (reflex protocol) [[id number]]  (Normal) Collected: 05/16/22 0556    Lab Status: Final result Specimen: Blood from Arm, Right Updated: 05/16/22 0636     hs TnI 0hr 4 ng/L     Protime-INR [[id number]]  (Normal) Collected: 05/16/22 0556    Lab Status: Final result Specimen: Blood from Arm, Right Updated: 05/16/22 0622     Protime 13 5 seconds      INR 1 07    APTT [[id number]]  (Normal) Collected: 05/16/22 0556    Lab Status: Final result Specimen: Blood from Arm, Right Updated: 05/16/22 0622     PTT 31 seconds     Comprehensive metabolic panel [[id number]]  (Abnormal) Collected: 05/16/22 0501    Lab Status: Final result Specimen: Blood from Arm, Right Updated: 05/16/22 0528     Sodium 138 mmol/L      Potassium 3 7 mmol/L      Chloride 104 mmol/L      CO2 27 mmol/L      ANION GAP 7 mmol/L      BUN 23 mg/dL      Creatinine 1 09 mg/dL      Glucose 184 mg/dL      Calcium 8 5 mg/dL      AST 18 U/L      ALT 22 U/L      Alkaline Phosphatase 78 U/L      Total Protein 7 0 g/dL      Albumin 3 6 g/dL      Total Bilirubin 0 82 mg/dL      eGFR 73 ml/min/1 73sq m     Narrative:      [place] guidelines for Chronic Kidney Disease (CKD):     Stage 1 with normal or high GFR (GFR > 90 mL/min/1 73 square meters)    Stage 2 Mild CKD (GFR = 60-89 mL/min/1 73 square meters)    Stage 3A Moderate CKD (GFR = 45-59 mL/min/1 73 square meters)    Stage 3B Moderate CKD (GFR = 30-44 mL/min/1 73 square meters)    Stage 4 Severe CKD (GFR = 15-29 mL/min/1 73 square meters)    Stage 5 End Stage CKD (GFR <15 mL/min/1 73 square meters)  Note: GFR calculation is accurate only with a steady state creatinine    Lipase [[id number]]  (Abnormal) Collected: 05/16/22 0501    Lab Status: Final result Specimen: Blood from Arm, Right Updated: 05/16/22 0528     Lipase 33 u/L     CBC and differential [[id number]]  (Abnormal) Collected: 05/16/22 0501    Lab Status: Final result Specimen: Blood from Arm, Right Updated: 05/16/22 0512     WBC 12 48 Thousand/uL      RBC 5 10 Million/uL      Hemoglobin 9 6 g/dL Hematocrit 30 6 %      MCV 60 fL      MCH 18 8 pg      MCHC 31 4 g/dL      RDW 17 5 %      MPV 9 6 fL      Platelets 483 Thousands/uL      nRBC 0 /100 WBCs      Neutrophils Relative 85 %      Immat GRANS % 1 %      Lymphocytes Relative 8 %      Monocytes Relative 5 %      Eosinophils Relative 1 %      Basophils Relative 0 %      Neutrophils Absolute 10 59 Thousands/µL      Immature Grans Absolute 0 06 Thousand/uL      Lymphocytes Absolute 0 99 Thousands/µL      Monocytes Absolute 0 67 Thousand/µL      Eosinophils Absolute 0 14 Thousand/µL      Basophils Absolute 0 03 Thousands/µL                  XR chest pa & lateral   Final Result by Sushila Villanueva MD (05/17 5202)      No acute cardiopulmonary disease  Workstation performed: [id number]         CT abdomen pelvis without contrast   Final Result by Socorro Murphy MD (05/16 3221)      Compared to 3 days ago, left inguinal hernia no longer contains bowel  No acute findings  Nonemergent findings above  Workstation performed: [id number]                    Procedures  Procedures         ED Course                               SBIRT 20yo+    Flowsheet Row Most Recent Value   SBIRT (25 yo +)    In order to provide better care to our patients, we are screening all of our patients for alcohol and drug use  Would it be okay to ask you these screening questions? Yes Filed at: 05/16/2022 0457   Initial Alcohol Screen: US AUDIT-C     1  How often do you have a drink containing alcohol? 0 Filed at: 05/16/2022 0457   2  How many drinks containing alcohol do you have on a typical day you are drinking? 0 Filed at: 05/16/2022 0457   3a  Male UNDER 65: How often do you have five or more drinks on one occasion? 0 Filed at: 05/16/2022 0457   Audit-C Score 0 Filed at: 05/16/2022 9789   EVAN: How many times in the past year have you    Used an illegal drug or used a prescription medication for non-medical reasons?  Never Filed at: 05/16/2022 Willy Jack MDM  Number of Diagnoses or Management Options  Left inguinal hernia: established and worsening  Lower abdominal pain: established and worsening  Palpitations: new and requires workup  Diagnosis management comments: Patient signed out to [facility]       Amount and/or Complexity of Data Reviewed  Review and summarize past medical records: yes  Discuss the patient with other providers: yes    Risk of Complications, Morbidity, and/or Mortality  Presenting problems: high  Diagnostic procedures: moderate  Management options: moderate    Patient Progress  Patient progress: stable      Disposition  Final diagnoses:   Lower abdominal pain   Palpitations   Left inguinal hernia     Time reflects when diagnosis was documented in both MDM as applicable and the Disposition within this note     Time User Action Codes Description Comment    5/16/2022  6:32 AM Margjohn Hemming Add [R10 30] Lower abdominal pain     5/16/2022  6:32 AM Margyanary Hemming Add [R00 2] Palpitations     5/16/2022  9:56 AM Gladys Calzada Add [K40 90] Left inguinal hernia     5/16/2022 11:52 AM Nallely Garb, Felecia L Add [I10] Hypertension, unspecified type     5/16/2022 11:52 AM Nallely Garb, Felecia L Add [R06 2] Wheezing     5/16/2022 11:52 AM Jackye Knack L Add [N91 2] [place]     5/16/2022 11:54 AM Jackye Knack L Add [J43 9] Pulmonary emphysema, unspecified emphysema type ([facility] )     5/16/2022 11:55 AM Jackye Knack L Add [I25 10] Coronary artery disease involving native heart without angina pectoris, unspecified vessel or lesion type     5/16/2022 11:55 AM Nallely Garb, [place] L Add [I48 91] Atrial fibrillation, unspecified type ([address] )     5/16/2022 11:55 AM Jackye Knack L Modify [I48 91] Atrial fibrillation, unspecified type ([facility] ) with h/o ablation    5/16/2022 12:09 PM Nehemiah Hogan Add [D64 9] Anemia, unspecified type     5/17/2022 12:20 PM Axel Cornejo Modify [K40 90] Left inguinal hernia       ED Disposition     ED Disposition Admit    Condition   Stable    Date/Time   Mon May 16, 2022 1056    Comment   Case was discussed with Dr Lanie Davey and the patient's admission status was agreed to be Admission Status: observation status to the service of Dr Lanie Davey   Follow-up Information     Follow up With Specialties Details Why Contact Info Additional [facility] Emergency Department Emergency Medicine Go to  If symptoms worsen [facility] Emergency Department, [address] [place], [place], [address]    Víctor Miller MD  Schedule an appointment as soon as possible for a visit in 1 week  [address] [facility] [address]  [address] [facility] General Surgery Schedule an appointment as soon as possible for a visit in 1 week Follow-up for hernia  [address]  [address] [place], [place], [address]   [phone]          Discharge Medication List as of 5/17/2022  4:46 PM      START taking these medications    Details   docusate sodium (COLACE) 100 mg capsule Take 1 capsule (100 mg total) by mouth in the morning and 1 capsule (100 mg total) in the evening  Do all this for 7 days  , Starting Tue 5/17/2022, Until Tue 5/24/2022, Normal      oxyCODONE (Roxicodone) 5 immediate release tablet Take 1 tablet (5 mg total) by mouth every 4 (four) hours as needed for moderate pain for up to 3 days Max Daily Amount: 30 mg, Starting Tue 5/17/2022, Until Fri 5/20/2022 at 2359, Normal         CONTINUE these medications which have NOT CHANGED    Details   albuterol (2 5 mg/3 mL) 0 083 % nebulizer solution Take 2 5 mg by nebulization every 6 (six) hours as needed for wheezing or shortness of breath, Historical Med      atorvastatin (LIPITOR) 40 mg tablet Take 40 mg by mouth in the morning , Historical Med      dicyclomine (BENTYL) 10 mg capsule Take 10 mg by mouth in the morning and 10 mg at noon and 10 mg in the evening , Starting Wed 1/12/2022, Historical Med      lisinopril (ZESTRIL) 10 mg tablet Take 10 mg by mouth in the morning , Starting Tue 12/14/2021, Historical Med      metoprolol tartrate (LOPRESSOR) 25 mg tablet Take 25 mg by mouth every 12 (twelve) hours, Historical Med      nitroglycerin (NITROSTAT) 0 3 mg SL tablet Place 0 3 mg under the tongue every 5 (five) minutes as needed for chest pain, Historical Med      omeprazole (PriLOSEC) 20 mg delayed release capsule Take 20 mg by mouth in the morning , Historical Med      ondansetron (ZOFRAN-ODT) 4 mg disintegrating tablet Take 4 mg by mouth, Starting Wed 1/12/2022, Historical Med      oxyCODONE-acetaminophen (PERCOCET) 5-325 mg per tablet TAKE 1 TAB AS NEEDED EVERY 6 HOURS DAY 1, 1 TAB EVERY 12 HOURS DAYS 2 1 TAB ONCE ON DAY 3 , Historical Med      Probiotic Product (Bacid) TABS Take 1 tablet by mouth in the morning and 1 tablet in the evening , Starting Tue 11/23/2021, Historical Med      raNITIdine HCl (ZANTAC 75 PO) Take by mouth, Historical Med             Outpatient Discharge Orders   Discharge Diet     Lifting restrictions     No driving until     Call provider for:  persistent nausea or vomiting     Call provider for:  severe uncontrolled pain     Call provider for:  redness, tenderness, or signs of infection (pain, swelling, redness, odor or green/yellow discharge around incision site)     Call provider for: active or persistent bleeding     Call provider for:  difficulty breathing, headache or visual disturbances     Remove dressing in 48 hours       PDMP Review       Value Time User    PDMP Reviewed  Yes 5/17/2022  3:11 PM Judson Márquez PA-C          ED Provider  Electronically Signed by           Lisandra Hook PA-C  07/04/22 9247

## 2022-09-27 ENCOUNTER — APPOINTMENT (OUTPATIENT)
Dept: RADIOLOGY | Facility: HOSPITAL | Age: 62
End: 2022-09-27
Payer: COMMERCIAL

## 2022-09-27 ENCOUNTER — HOSPITAL ENCOUNTER (EMERGENCY)
Facility: HOSPITAL | Age: 62
Discharge: HOME | End: 2022-09-27
Attending: EMERGENCY MEDICINE
Payer: COMMERCIAL

## 2022-09-27 VITALS
OXYGEN SATURATION: 97 % | DIASTOLIC BLOOD PRESSURE: 62 MMHG | HEART RATE: 79 BPM | SYSTOLIC BLOOD PRESSURE: 117 MMHG | RESPIRATION RATE: 18 BRPM | TEMPERATURE: 97.8 F

## 2022-09-27 DIAGNOSIS — J40 BRONCHITIS: Primary | ICD-10-CM

## 2022-09-27 LAB
2HR DELTA HS TROPONIN: 0 NG/L
ALBUMIN SERPL BCP-MCNC: 3.7 G/DL (ref 3.5–5)
ALP SERPL-CCNC: 75 U/L (ref 46–116)
ALT SERPL W P-5'-P-CCNC: 19 U/L (ref 12–78)
ANION GAP SERPL CALCULATED.3IONS-SCNC: 8 MMOL/L (ref 4–13)
AST SERPL W P-5'-P-CCNC: 16 U/L (ref 5–45)
ATRIAL RATE: 86 BPM
BASOPHILS # BLD AUTO: 0.05 THOUSANDS/ΜL (ref 0–0.1)
BASOPHILS NFR BLD AUTO: 1 % (ref 0–1)
BILIRUB SERPL-MCNC: 0.78 MG/DL (ref 0.2–1)
BUN SERPL-MCNC: 16 MG/DL (ref 5–25)
CALCIUM SERPL-MCNC: 9 MG/DL (ref 8.3–10.1)
CARDIAC TROPONIN I PNL SERPL HS: 4 NG/L
CARDIAC TROPONIN I PNL SERPL HS: 4 NG/L
CHLORIDE SERPL-SCNC: 103 MMOL/L (ref 96–108)
CO2 SERPL-SCNC: 28 MMOL/L (ref 21–32)
CREAT SERPL-MCNC: 1.05 MG/DL (ref 0.6–1.3)
D DIMER PPP FEU-MCNC: 0.29 UG/ML FEU
EOSINOPHIL # BLD AUTO: 0.25 THOUSAND/ΜL (ref 0–0.61)
EOSINOPHIL NFR BLD AUTO: 2 % (ref 0–6)
ERYTHROCYTE [DISTWIDTH] IN BLOOD BY AUTOMATED COUNT: 19.3 % (ref 11.6–15.1)
FLUAV RNA RESP QL NAA+PROBE: NEGATIVE
FLUBV RNA RESP QL NAA+PROBE: NEGATIVE
GFR SERPL CREATININE-BSD FRML MDRD: 76 ML/MIN/1.73SQ M
GLUCOSE SERPL-MCNC: 133 MG/DL (ref 65–140)
HCT VFR BLD AUTO: 36.3 % (ref 36.5–49.3)
HGB BLD-MCNC: 11.6 G/DL (ref 12–17)
IMM GRANULOCYTES # BLD AUTO: 0.04 THOUSAND/UL (ref 0–0.2)
IMM GRANULOCYTES NFR BLD AUTO: 0 % (ref 0–2)
LACTATE SERPL-SCNC: 1.3 MMOL/L (ref 0.5–2)
LYMPHOCYTES # BLD AUTO: 1.63 THOUSANDS/ΜL (ref 0.6–4.47)
LYMPHOCYTES NFR BLD AUTO: 16 % (ref 14–44)
MCH RBC QN AUTO: 19.5 PG (ref 26.8–34.3)
MCHC RBC AUTO-ENTMCNC: 32 G/DL (ref 31.4–37.4)
MCV RBC AUTO: 61 FL (ref 82–98)
MONOCYTES # BLD AUTO: 0.65 THOUSAND/ΜL (ref 0.17–1.22)
MONOCYTES NFR BLD AUTO: 6 % (ref 4–12)
NEUTROPHILS # BLD AUTO: 7.67 THOUSANDS/ΜL (ref 1.85–7.62)
NEUTS SEG NFR BLD AUTO: 75 % (ref 43–75)
NRBC BLD AUTO-RTO: 0 /100 WBCS
NT-PROBNP SERPL-MCNC: 38 PG/ML
P AXIS: 41 DEGREES
PLATELET # BLD AUTO: 300 THOUSANDS/UL (ref 149–390)
PMV BLD AUTO: 9.1 FL (ref 8.9–12.7)
POTASSIUM SERPL-SCNC: 3.8 MMOL/L (ref 3.5–5.3)
PR INTERVAL: 164 MS
PROT SERPL-MCNC: 7.3 G/DL (ref 6.4–8.4)
QRS AXIS: 40 DEGREES
QRSD INTERVAL: 96 MS
QT INTERVAL: 360 MS
QTC INTERVAL: 430 MS
RBC # BLD AUTO: 5.94 MILLION/UL (ref 3.88–5.62)
RSV RNA RESP QL NAA+PROBE: NEGATIVE
SARS-COV-2 RNA RESP QL NAA+PROBE: NEGATIVE
SODIUM SERPL-SCNC: 139 MMOL/L (ref 135–147)
T WAVE AXIS: 38 DEGREES
VENTRICULAR RATE: 86 BPM
WBC # BLD AUTO: 10.29 THOUSAND/UL (ref 4.31–10.16)

## 2022-09-27 PROCEDURE — 99285 EMERGENCY DEPT VISIT HI MDM: CPT | Performed by: EMERGENCY MEDICINE

## 2022-09-27 PROCEDURE — 96360 HYDRATION IV INFUSION INIT: CPT

## 2022-09-27 PROCEDURE — 93005 ELECTROCARDIOGRAM TRACING: CPT

## 2022-09-27 PROCEDURE — 36415 COLL VENOUS BLD VENIPUNCTURE: CPT | Performed by: EMERGENCY MEDICINE

## 2022-09-27 PROCEDURE — 85379 FIBRIN DEGRADATION QUANT: CPT | Performed by: EMERGENCY MEDICINE

## 2022-09-27 PROCEDURE — 83605 ASSAY OF LACTIC ACID: CPT | Performed by: EMERGENCY MEDICINE

## 2022-09-27 PROCEDURE — 83880 ASSAY OF NATRIURETIC PEPTIDE: CPT | Performed by: EMERGENCY MEDICINE

## 2022-09-27 PROCEDURE — 0241U HB NFCT DS VIR RESP RNA 4 TRGT: CPT | Performed by: EMERGENCY MEDICINE

## 2022-09-27 PROCEDURE — 96361 HYDRATE IV INFUSION ADD-ON: CPT

## 2022-09-27 PROCEDURE — 71046 X-RAY EXAM CHEST 2 VIEWS: CPT

## 2022-09-27 PROCEDURE — 85025 COMPLETE CBC W/AUTO DIFF WBC: CPT | Performed by: EMERGENCY MEDICINE

## 2022-09-27 PROCEDURE — 80053 COMPREHEN METABOLIC PANEL: CPT | Performed by: EMERGENCY MEDICINE

## 2022-09-27 PROCEDURE — 84484 ASSAY OF TROPONIN QUANT: CPT | Performed by: EMERGENCY MEDICINE

## 2022-09-27 PROCEDURE — 93010 ELECTROCARDIOGRAM REPORT: CPT | Performed by: INTERNAL MEDICINE

## 2022-09-27 PROCEDURE — 99285 EMERGENCY DEPT VISIT HI MDM: CPT

## 2022-09-27 PROCEDURE — 87040 BLOOD CULTURE FOR BACTERIA: CPT | Performed by: EMERGENCY MEDICINE

## 2022-09-27 RX ORDER — ALBUTEROL SULFATE 90 UG/1
2 AEROSOL, METERED RESPIRATORY (INHALATION) ONCE
Status: COMPLETED | OUTPATIENT
Start: 2022-09-27 | End: 2022-09-27

## 2022-09-27 RX ADMIN — ALBUTEROL SULFATE 2 PUFF: 90 AEROSOL, METERED RESPIRATORY (INHALATION) at 14:45

## 2022-09-27 RX ADMIN — SODIUM CHLORIDE 1000 ML: 0.9 INJECTION, SOLUTION INTRAVENOUS at 14:46

## 2022-09-27 NOTE — CASE MANAGEMENT
Case Management Assessment & Discharge Planning Note    Patient name Timo Moe  Location F9M0/D7V1 MRN [id number]  : 1960 Date 2022       Current Admission Date: 2022  Current Admission Diagnosis:SOB (shortness of breath)   Patient Active Problem List    Diagnosis Date Noted    Coronary artery disease 2022    Iron deficiency anemia 2022    Liver cancer ([facility] ) 2022    Abnormal CT scan 2021    Diverticulitis of large intestine without perforation or abscess without bleeding 2021    COPD (chronic obstructive pulmonary disease) ([facility] ) 2021    Primary hypertension 2021      LOS (days): 0  Geometric Mean LOS (GMLOS) (days):   Days to GMLOS:     OBJECTIVE:       Current admission status: Emergency  Referral Reason: Other (Broken home O2)    Preferred Pharmacy:   [facility] [address], [address]  Phone: [phone] Fax: [phone]    Primary Care Provider: Coco Patrick MD    Primary Insurance: [address]  Secondary Insurance:     ASSESSMENT:  [facility] [employer]     [address] [place] [address]   Primary Phone: [phone] (Mobile)               Advance Directives  Does patient have a [address]?: Yes  Does patient have Advance Directives?: No  Was patient offered paperwork?: No    Readmission Root Cause  30 Day Readmission: No    Patient Information  Admitted from[de-identified] Home  Mental Status: Alert  During Assessment patient was accompanied by: Spouse, Other-Comment (Wifes caregiver Haritha Burnham)  Assessment information provided by[de-identified] Patient  Primary Caregiver: Self  Support Systems: Self, Spouse/significant other  [place] of Residence: [place] [address] do you live in?: [place]  Type of Current Residence: [facility]  Living Arrangements: Lives w/ Spouse/significant other  Is patient a ?: No    Activities of Daily Living Prior to Admission  Functional Status: Independent  Completes ADLs independently?: Yes  Ambulates independently?: Yes  Does patient use assisted devices?: No  Does patient currently own DME?: No  Does patient have a history of Outpatient Therapy (PT/OT)?: No  Does the patient have a history of Short-Term Rehab?: No  Does patient have a history of HHC?: Yes ([facility])  Does patient currently have Thomas Alvarado?: No    Patient Information Continued  Does patient have prescription coverage?: Yes  Does patient receive dialysis treatments?: No  Does patient have a history of substance abuse?: No  Does patient have a history of Mental Health Diagnosis?: No    Means of Transportation  Means of Transport to [facility][de-identified] Public Transportation - Bus    DISCHARGE DETAILS:    Discharge planning discussed with[de-identified] The patient &  his wife  Freedom of Choice: Yes  Comments - Freedom of Choice: CM was consulted to the ER because the patient reported that his home O2 concentrator out was broken  The CM met with the patient, his wife, and her caregiver Radha Dixon in the ED  Per the patient the humification on the concentrator has been broken for several months  The patient and his wife stated that [facility] [address] is the O2 provider  The CM asked the patient have they contacted [facility] to service the concentor an they replied no  The CM notified [facility] to request service for the patients home O2 spoke with Ashley Enriquez, the patient home O2 order  in May 2022 and the O2 that the patient is using is his mothers  In addition to, if the patient is requiring home O2 a new order is needed  The CM notified the patient and the medical team to make aware  Per the ER MD, the patient was evaluated for home O2 in the ER and is RA resting sat was 95%  The patient does not qualify for home O2  The patient will be discharging home with self care and family support  The patient wife caregiver will provide transportation home    CM contacted family/caregiver?: Yes  Were Treatment Team discharge recommendations reviewed with patient/caregiver?: Yes       Contacts  Patient Contacts: Kurt Hilliard  Relationship to Patient[de-identified] Family  Contact Method: Phone  Phone Number: [phone]  Reason/Outcome: Continuity of Care, Emergency [address]         Is the patient interested in [place] [address] at discharge?: No    DME Referral Provided  Referral made for DME?: No    Treatment Team Recommendation: Home  Discharge Destination Plan[de-identified] Home

## 2022-09-27 NOTE — ED PROVIDER NOTES
History  Chief Complaint   Patient presents with    Shortness of Breath     C/O of chest congestion and cough, also feeling sob because his O2 tank at home ran out yesterday  Usually wears 3L     Patient is a 70-year-old male past medical history of COPD on 3 L home oxygen, liver cancer, hyperlipidemia, mi, diverticulitis, hypertension presenting for cough  Patient notes cough productive of black sputum for the past 2 days  States that he has been with similar symptoms for the past several weeks and was given medicine, chart review reveals of the mycin, prednisone, benzonatate with no relief  States that he is having fevers with T-max of 102°, decreased p o  Intake, fatigue and weakness  He notes shortness of breath which began this morning after his oxygen machine broke  Notes swelling to his feet for the last 3 days  Denies any upper respiratory symptoms, rashes, vision changes, dysuria, but does know lightheadedness as well as diarrhea  States that he vomits daily at baseline and that is unchanged          Prior to Admission Medications   Prescriptions Last Dose Informant Patient Reported? Taking? Probiotic Product (Bacid) TABS  Self Yes No   Sig: Take 1 tablet by mouth in the morning and 1 tablet in the evening  albuterol (2 5 mg/3 mL) 0 083 % nebulizer solution  Self Yes No   Sig: Take 2 5 mg by nebulization every 6 (six) hours as needed for wheezing or shortness of breath   atorvastatin (LIPITOR) 40 mg tablet  Self Yes No   Sig: Take 40 mg by mouth in the morning  dicyclomine (BENTYL) 10 mg capsule  Self Yes No   Sig: Take 10 mg by mouth in the morning and 10 mg at noon and 10 mg in the evening  docusate sodium (COLACE) 100 mg capsule   No No   Sig: Take 1 capsule (100 mg total) by mouth in the morning and 1 capsule (100 mg total) in the evening  Do all this for 7 days  lisinopril (ZESTRIL) 10 mg tablet  Self Yes No   Sig: Take 10 mg by mouth in the morning     metoprolol tartrate (LOPRESSOR) 25 mg tablet  Self Yes No   Sig: Take 25 mg by mouth every 12 (twelve) hours   nitroglycerin (NITROSTAT) 0 3 mg SL tablet  Self Yes No   Sig: Place 0 3 mg under the tongue every 5 (five) minutes as needed for chest pain   omeprazole (PriLOSEC) 20 mg delayed release capsule  Self Yes No   Sig: Take 20 mg by mouth in the morning  ondansetron (ZOFRAN-ODT) 4 mg disintegrating tablet  Self Yes No   Sig: Take 4 mg by mouth   oxyCODONE-acetaminophen (PERCOCET) 5-325 mg per tablet  Self Yes No   Sig: TAKE 1 TAB AS NEEDED EVERY 6 HOURS DAY 1, 1 TAB EVERY 12 HOURS DAYS 2 1 TAB ONCE ON DAY 3    raNITIdine HCl (ZANTAC 75 PO)  Self Yes No   Sig: Take by mouth   traMADol-acetaminophen (ULTRACET) 37 5-325 mg per tablet   No No   Sig: Take 1 tablet by mouth every 8 (eight) hours as needed for moderate pain      Facility-Administered Medications: None       Past Medical History:   Diagnosis Date    Angina pectoris (HCC)     Asthma     Cancer [facility] )     liver    Colon polyp     Coronary artery disease     Heart attack ([facility] )     Hyperlipidemia     Hypertension     Liver disease     Shortness of breath        Past Surgical History:   Procedure Laterality Date    BACK SURGERY      CHOLECYSTECTOMY      COLONOSCOPY      CORONARY ANGIOPLASTY WITH STENT PLACEMENT      HERNIA REPAIR      HERNIA REPAIR Left 5/17/2022    Procedure: laparoscopic attempted converted to open LEFT inguinal hernia repair with mesh;  Surgeon: Irene Medina MD;  Location: [facility];  Service: General    INGUINAL HERNIA REPAIR Left 05/17/2022    laparoscopic attempted converted to open L inguinal hernia repair w/mesh, Dr Ellis Block ARTHROSCOPY      TONSILLECTOMY         No family history on file  I have reviewed and agree with the history as documented      E-Cigarette/Vaping    E-Cigarette Use Never User      E-Cigarette/Vaping Substances    Nicotine No     THC No     CBD No     Flavoring No     Other No     Unknown No Social History     Tobacco Use    Smoking status: Never Smoker    Smokeless tobacco: Never Used   Vaping Use    Vaping Use: Never used   Substance Use Topics    Alcohol use: Never    Drug use: Never       Review of Systems   All other systems reviewed and are negative  Physical Exam  Physical Exam  Vitals reviewed  Constitutional:       General: He is not in acute distress  Appearance: Normal appearance  He is not ill-appearing  HENT:      Mouth/Throat:      Mouth: Mucous membranes are moist    Eyes:      Conjunctiva/sclera: Conjunctivae normal       Comments: Normal conjunctiva   Cardiovascular:      Rate and Rhythm: Normal rate and regular rhythm  Heart sounds: Normal heart sounds  Pulmonary:      Effort: Pulmonary effort is normal       Breath sounds: Normal breath sounds  Abdominal:      General: Abdomen is flat  Palpations: Abdomen is soft  Tenderness: There is no abdominal tenderness  Musculoskeletal:         General: No swelling  Normal range of motion  Cervical back: Neck supple  Right lower leg: No tenderness  No edema  Left lower leg: No tenderness  No edema  Skin:     General: Skin is warm and dry  Neurological:      General: No focal deficit present  Mental Status: He is alert     Psychiatric:         Mood and Affect: Mood normal          Vital Signs  ED Triage Vitals   Temperature Pulse Respirations Blood Pressure SpO2   09/27/22 1334 09/27/22 1334 09/27/22 1334 09/27/22 1334 09/27/22 1334   97 8 °F (36 6 °C) 104 20 143/67 97 %      Temp Source Heart Rate Source Patient Position - Orthostatic VS BP Location FiO2 (%)   09/27/22 1334 09/27/22 1334 09/27/22 1617 09/27/22 1334 --   Temporal Monitor Lying Left arm       Pain Score       --                  Vitals:    09/27/22 1334 09/27/22 1617   BP: 143/67 117/62   Pulse: 104 79   Patient Position - Orthostatic VS:  Lying         Visual Acuity      ED Medications  Medications   sodium chloride 0 9 % bolus 1,000 mL (0 mL Intravenous Stopped 9/27/22 1633)   albuterol (PROVENTIL HFA,VENTOLIN HFA) inhaler 2 puff (2 puffs Inhalation Given 9/27/22 1445)       Diagnostic Studies  Results Reviewed     Procedure Component Value Units Date/Time    Blood culture #1 [[id number]] Collected: 09/27/22 1435    Lab Status: Preliminary result Specimen: Blood from Arm, Left Updated: 09/27/22 2103     Blood Culture Received in Microbiology Lab  Culture in Progress  Blood culture #2 [[id number]] Collected: 09/27/22 1435    Lab Status: Preliminary result Specimen: Blood from Hand, Right Updated: 09/27/22 2103     Blood Culture Received in Microbiology Lab  Culture in Progress  HS Troponin I 2hr [[id number]]  (Normal) Collected: 09/27/22 1633    Lab Status: Final result Specimen: Blood from Arm, Left Updated: 09/27/22 1707     hs TnI 2hr 4 ng/L      Delta 2hr hsTnI 0 ng/L     FLU/RSV/COVID - if FLU/RSV clinically relevant [[id number]]  (Normal) Collected: 09/27/22 1435    Lab Status: Final result Specimen: Nares from Nasopharyngeal Swab Updated: 09/27/22 1537     SARS-CoV-2 Negative     INFLUENZA A PCR Negative     INFLUENZA B PCR Negative     RSV PCR Negative    Narrative:      FOR PEDIATRIC PATIENTS - copy/paste COVID Guidelines URL to browser: [email or web address]    SARS-CoV-2 assay is a Nucleic Acid Amplification assay intended for the  qualitative detection of nucleic acid from SARS-CoV-2 in nasopharyngeal  swabs  Results are for the presumptive identification of SARS-CoV-2 RNA  Positive results are indicative of infection with SARS-CoV-2, the virus  causing COVID-19, but do not rule out bacterial infection or co-infection  with other viruses  Laboratories within the [place] and its  territories are required to report all positive results to the appropriate  public health authorities   Negative results do not preclude SARS-CoV-2  infection and should not be used as the sole basis for treatment or other  patient management decisions  Negative results must be combined with  clinical observations, patient history, and epidemiological information  This test has not been FDA cleared or approved  This test has been authorized by FDA under an Emergency Use Authorization  (EUA)  This test is only authorized for the duration of time the  declaration that circumstances exist justifying the authorization of the  emergency use of an in vitro diagnostic tests for detection of SARS-CoV-2  virus and/or diagnosis of COVID-19 infection under section 564(b)(1) of  the Act, 21 U  S C  754PON-5(E)(3), unless the authorization is terminated  or revoked sooner  The test has been validated but independent review by FDA  and CLIA is pending  Test performed using [employer] GeneXpert: This RT-PCR assay targets N2,  a region unique to SARS-CoV-2  A conserved region in the E-gene was chosen  for pan-Sarbecovirus detection which includes SARS-CoV-2  According to CMS-2020-01-R, this platform meets the definition of high-throughput technology      NT-BNP PRO [[id number]]  (Normal) Collected: 09/27/22 1435    Lab Status: Final result Specimen: Blood from Arm, Left Updated: 09/27/22 1517     NT-proBNP 38 pg/mL     Comprehensive metabolic panel [[id number]] Collected: 09/27/22 1435    Lab Status: Final result Specimen: Blood from Arm, Left Updated: 09/27/22 1517     Sodium 139 mmol/L      Potassium 3 8 mmol/L      Chloride 103 mmol/L      CO2 28 mmol/L      ANION GAP 8 mmol/L      BUN 16 mg/dL      Creatinine 1 05 mg/dL      Glucose 133 mg/dL      Calcium 9 0 mg/dL      AST 16 U/L      ALT 19 U/L      Alkaline Phosphatase 75 U/L      Total Protein 7 3 g/dL      Albumin 3 7 g/dL      Total Bilirubin 0 78 mg/dL      eGFR 76 ml/min/1 73sq m     Narrative:      Zach guidelines for Chronic Kidney Disease (CKD):     Stage 1 with normal or high GFR (GFR > 90 mL/min/1 73 square meters)    Stage 2 Mild CKD (GFR = 60-89 mL/min/1 73 square meters)    Stage 3A Moderate CKD (GFR = 45-59 mL/min/1 73 square meters)    Stage 3B Moderate CKD (GFR = 30-44 mL/min/1 73 square meters)    Stage 4 Severe CKD (GFR = 15-29 mL/min/1 73 square meters)    Stage 5 End Stage CKD (GFR <15 mL/min/1 73 square meters)  Note: GFR calculation is accurate only with a steady state creatinine    HS Troponin 0hr (reflex protocol) [[id number]]  (Normal) Collected: 09/27/22 1435    Lab Status: Final result Specimen: Blood from Hand, Right Updated: 09/27/22 1514     hs TnI 0hr 4 ng/L     Lactic acid [[id number]]  (Normal) Collected: 09/27/22 1435    Lab Status: Final result Specimen: Blood from Arm, Left Updated: 09/27/22 1508     LACTIC ACID 1 3 mmol/L     Narrative:      Result may be elevated if tourniquet was used during collection  D-dimer, quantitative [[id number]]  (Normal) Collected: 09/27/22 1435    Lab Status: Final result Specimen: Blood from Arm, Left Updated: 09/27/22 1503     D-Dimer, Quant 0 29 ug/ml FEU     Narrative: In the evaluation for possible pulmonary embolism, in the appropriate (Well's Score of 4 or less) patient, the age adjusted d-dimer cutoff for this patient can be calculated as:    Age x 0 01 (in ug/mL) for Age-adjusted D-dimer exclusion threshold for a patient over 50 years      CBC and differential [[id number]]  (Abnormal) Collected: 09/27/22 1435    Lab Status: Final result Specimen: Blood from Arm, Left Updated: 09/27/22 1451     WBC 10 29 Thousand/uL      RBC 5 94 Million/uL      Hemoglobin 11 6 g/dL      Hematocrit 36 3 %      MCV 61 fL      MCH 19 5 pg      MCHC 32 0 g/dL      RDW 19 3 %      MPV 9 1 fL      Platelets 395 Thousands/uL      nRBC 0 /100 WBCs      Neutrophils Relative 75 %      Immat GRANS % 0 %      Lymphocytes Relative 16 %      Monocytes Relative 6 %      Eosinophils Relative 2 %      Basophils Relative 1 %      Neutrophils Absolute 7 67 Thousands/µL      Immature Grans Absolute 0 04 Thousand/uL      Lymphocytes Absolute 1 63 Thousands/µL      Monocytes Absolute 0 65 Thousand/µL      Eosinophils Absolute 0 25 Thousand/µL      Basophils Absolute 0 05 Thousands/µL                  XR chest 2 views   ED Interpretation by Eusebio Eisenmenger, DO (09/27 1512)   NAD                 Procedures  ECG 12 Lead Documentation Only    Date/Time: 9/27/2022 2:43 PM  Performed by: Eusebio Eisenmenger, DO  Authorized by: Eusebio Eisenmenger, DO     ECG reviewed by me, the ED Provider: yes    Patient location:  ED  Previous ECG:     Previous ECG:  Compared to current    Similarity:  No change  Interpretation:     Interpretation: normal    Rate:     ECG rate assessment: normal    Rhythm:     Rhythm: sinus rhythm    Ectopy:     Ectopy: none    QRS:     QRS axis:  Normal    QRS intervals:  Normal  Conduction:     Conduction: normal    ST segments:     ST segments:  Normal  T waves:     T waves: normal               ED Course  ED Course as of 09/27/22 2142 Tue Sep 27, 2022   1713 Workup unremarkable, have discussed return precautions outpatient follow-up for bronchitis which patient states he understands  Patient tolerated walking pulse ox on room air with an oxygen saturation of 94% with no signs respiratory distress  Patient has been using his mother's home oxygen which he states is currently broken however does not have his own and would not qualify based on his oxygen here therefore do not feel that he requires further intervention from the emergency department                    Stroke Assessment     Row Name 09/27/22 1554             NIH Stroke Scale    Interval --      Level of Consciousness (1a ) 0      LOC Questions (1b ) 0      LOC Commands (1c ) 0      Best Gaze (2 ) 0      Visual (3 ) 0      Facial Palsy (4 ) 0      Motor Arm, Left (5a ) 0      Motor Arm, Right (5b ) 0      Motor Leg, Left (6a ) 0      Motor Leg, Right (6b ) 0      Limb Ataxia (7 ) 0 Sensory (8 ) 1      Best Language (9 ) 0      Dysarthria (10 ) 0      Extinction and Inattention (11 ) (Formerly Neglect) 0      Total 1                            SBIRT 20yo+    Flowsheet Row Most Recent Value   SBIRT (23 yo +)    In order to provide better care to our patients, we are screening all of our patients for alcohol and drug use  Would it be okay to ask you these screening questions? Yes Filed at: 09/27/2022 1521   Initial Alcohol Screen: US AUDIT-C     1  How often do you have a drink containing alcohol? 0 Filed at: 09/27/2022 1521   2  How many drinks containing alcohol do you have on a typical day you are drinking? 0 Filed at: 09/27/2022 1521   3a  Male UNDER 65: How often do you have five or more drinks on one occasion? 0 Filed at: 09/27/2022 1521   Audit-C Score 0 Filed at: 09/27/2022 1521   EVAN: How many times in the past year have you    Used an illegal drug or used a prescription medication for non-medical reasons? Never Filed at: 09/27/2022 1521                    MDM  Number of Diagnoses or Management Options  Diagnosis management comments: Patient is a 61-year-old male past medical history of COPD on 3 L, liver cancer, hypertension, hyperlipidemia, CAD, diverticulitis, anemia presenting with cough  Patient is well-appearing bedside with stable vitals though with low-grade tachycardia but currently saturating appropriately on room air with no signs respiratory distress, no conversational dyspnea, no retractions or accessory muscle use with lungs clear to auscultation but with bronchospastic cough at bedside  Patient was diagnosed bronchitis 17 days ago and given azithromycin, prednisone, benzonatate with no relief  Will obtain cardiac workup as patient notes fatigue, D-dimer, give albuterol inhaler, fluids and reassess        Disposition  Final diagnoses:   Bronchitis     Time reflects when diagnosis was documented in both MDM as applicable and the Disposition within this note     Time User Action Codes Description Comment    9/27/2022  5:14 PM Jarred Oleary Add [J40] Bronchitis       ED Disposition     ED Disposition   Discharge    Condition   Stable    Date/Time   Tue Sep 27, 2022  5:14 PM    Comment   Marbin Bath discharge to home/self care  Follow-up Information     Follow up With Specialties Details Why Contact Info    Jacqueline Cantrell MD  In 1 week  [facility] [id number] [place] [address]  N  [phone]            Discharge Medication List as of 9/27/2022  5:14 PM      CONTINUE these medications which have NOT CHANGED    Details   albuterol (2 5 mg/3 mL) 0 083 % nebulizer solution Take 2 5 mg by nebulization every 6 (six) hours as needed for wheezing or shortness of breath, Historical Med      atorvastatin (LIPITOR) 40 mg tablet Take 40 mg by mouth in the morning , Historical Med      dicyclomine (BENTYL) 10 mg capsule Take 10 mg by mouth in the morning and 10 mg at noon and 10 mg in the evening , Starting Wed 1/12/2022, Historical Med      docusate sodium (COLACE) 100 mg capsule Take 1 capsule (100 mg total) by mouth in the morning and 1 capsule (100 mg total) in the evening  Do all this for 7 days  , Starting Tue 5/17/2022, Until Tue 5/24/2022, Normal      lisinopril (ZESTRIL) 10 mg tablet Take 10 mg by mouth in the morning , Starting Tue 12/14/2021, Historical Med      metoprolol tartrate (LOPRESSOR) 25 mg tablet Take 25 mg by mouth every 12 (twelve) hours, Historical Med      nitroglycerin (NITROSTAT) 0 3 mg SL tablet Place 0 3 mg under the tongue every 5 (five) minutes as needed for chest pain, Historical Med      omeprazole (PriLOSEC) 20 mg delayed release capsule Take 20 mg by mouth in the morning , Historical Med      ondansetron (ZOFRAN-ODT) 4 mg disintegrating tablet Take 4 mg by mouth, Starting Wed 1/12/2022, Historical Med      oxyCODONE-acetaminophen (PERCOCET) 5-325 mg per tablet TAKE 1 TAB AS NEEDED EVERY 6 HOURS DAY 1, 1 TAB EVERY 12 HOURS DAYS 2 1 TAB ONCE ON DAY 3 , Historical Med      Probiotic Product (Bacid) TABS Take 1 tablet by mouth in the morning and 1 tablet in the evening , Starting Tue 11/23/2021, Historical Med      raNITIdine HCl (ZANTAC 75 PO) Take by mouth, Historical Med      traMADol-acetaminophen (ULTRACET) 37 5-325 mg per tablet Take 1 tablet by mouth every 8 (eight) hours as needed for moderate pain, Starting Wed 6/1/2022, Normal             No discharge procedures on file      PDMP Review       Value Time User    PDMP Reviewed  Yes 5/17/2022  3:11 PM Geetha Vyas PA-C          ED Provider  Electronically Signed by           Leodan Mitchell DO  09/27/22 0085

## 2022-10-02 LAB
BACTERIA BLD CULT: NORMAL
BACTERIA BLD CULT: NORMAL

## 2022-10-20 ENCOUNTER — HOSPITAL ENCOUNTER (EMERGENCY)
Facility: HOSPITAL | Age: 62
Discharge: HOME | End: 2022-10-20
Attending: EMERGENCY MEDICINE
Payer: COMMERCIAL

## 2022-10-20 ENCOUNTER — APPOINTMENT (EMERGENCY)
Dept: CT IMAGING | Facility: HOSPITAL | Age: 62
End: 2022-10-20
Payer: COMMERCIAL

## 2022-10-20 VITALS
SYSTOLIC BLOOD PRESSURE: 110 MMHG | OXYGEN SATURATION: 95 % | RESPIRATION RATE: 20 BRPM | DIASTOLIC BLOOD PRESSURE: 72 MMHG | TEMPERATURE: 98.1 F | HEART RATE: 78 BPM

## 2022-10-20 DIAGNOSIS — R11.10 VOMITING: Primary | ICD-10-CM

## 2022-10-20 DIAGNOSIS — R07.9 CHEST PAIN: ICD-10-CM

## 2022-10-20 DIAGNOSIS — R10.9 ABDOMINAL PAIN: ICD-10-CM

## 2022-10-20 LAB
2HR DELTA HS TROPONIN: 2 NG/L
ALBUMIN SERPL BCP-MCNC: 3.6 G/DL (ref 3.5–5)
ALP SERPL-CCNC: 84 U/L (ref 46–116)
ALT SERPL W P-5'-P-CCNC: 18 U/L (ref 12–78)
ANION GAP SERPL CALCULATED.3IONS-SCNC: 7 MMOL/L (ref 4–13)
AST SERPL W P-5'-P-CCNC: 17 U/L (ref 5–45)
ATRIAL RATE: 89 BPM
BASOPHILS # BLD AUTO: 0.07 THOUSANDS/ÂΜL (ref 0–0.1)
BASOPHILS NFR BLD AUTO: 1 % (ref 0–1)
BILIRUB SERPL-MCNC: 0.71 MG/DL (ref 0.2–1)
BUN SERPL-MCNC: 17 MG/DL (ref 5–25)
CALCIUM SERPL-MCNC: 8.9 MG/DL (ref 8.3–10.1)
CARDIAC TROPONIN I PNL SERPL HS: 3 NG/L
CARDIAC TROPONIN I PNL SERPL HS: 5 NG/L
CHLORIDE SERPL-SCNC: 106 MMOL/L (ref 96–108)
CO2 SERPL-SCNC: 29 MMOL/L (ref 21–32)
CREAT SERPL-MCNC: 0.9 MG/DL (ref 0.6–1.3)
EOSINOPHIL # BLD AUTO: 0.3 THOUSAND/ÂΜL (ref 0–0.61)
EOSINOPHIL NFR BLD AUTO: 3 % (ref 0–6)
ERYTHROCYTE [DISTWIDTH] IN BLOOD BY AUTOMATED COUNT: 19.4 % (ref 11.6–15.1)
FLUAV RNA RESP QL NAA+PROBE: NEGATIVE
FLUBV RNA RESP QL NAA+PROBE: NEGATIVE
GFR SERPL CREATININE-BSD FRML MDRD: 91 ML/MIN/1.73SQ M
GLUCOSE SERPL-MCNC: 133 MG/DL (ref 65–140)
HCT VFR BLD AUTO: 36.3 % (ref 36.5–49.3)
HGB BLD-MCNC: 11.4 G/DL (ref 12–17)
IMM GRANULOCYTES # BLD AUTO: 0.15 THOUSAND/UL (ref 0–0.2)
IMM GRANULOCYTES NFR BLD AUTO: 2 % (ref 0–2)
LIPASE SERPL-CCNC: 72 U/L (ref 73–393)
LYMPHOCYTES # BLD AUTO: 2.42 THOUSANDS/ÂΜL (ref 0.6–4.47)
LYMPHOCYTES NFR BLD AUTO: 24 % (ref 14–44)
MCH RBC QN AUTO: 19.1 PG (ref 26.8–34.3)
MCHC RBC AUTO-ENTMCNC: 31.4 G/DL (ref 31.4–37.4)
MCV RBC AUTO: 61 FL (ref 82–98)
MONOCYTES # BLD AUTO: 0.67 THOUSAND/ÂΜL (ref 0.17–1.22)
MONOCYTES NFR BLD AUTO: 7 % (ref 4–12)
NEUTROPHILS # BLD AUTO: 6.48 THOUSANDS/ÂΜL (ref 1.85–7.62)
NEUTS SEG NFR BLD AUTO: 63 % (ref 43–75)
NRBC BLD AUTO-RTO: 1 /100 WBCS
P AXIS: 37 DEGREES
PLATELET # BLD AUTO: 353 THOUSANDS/UL (ref 149–390)
PMV BLD AUTO: 9.3 FL (ref 8.9–12.7)
POTASSIUM SERPL-SCNC: 4 MMOL/L (ref 3.5–5.3)
PR INTERVAL: 164 MS
PROT SERPL-MCNC: 7.3 G/DL (ref 6.4–8.4)
QRS AXIS: 48 DEGREES
QRSD INTERVAL: 98 MS
QT INTERVAL: 370 MS
QTC INTERVAL: 450 MS
RBC # BLD AUTO: 5.97 MILLION/UL (ref 3.88–5.62)
RSV RNA RESP QL NAA+PROBE: NEGATIVE
SARS-COV-2 RNA RESP QL NAA+PROBE: NEGATIVE
SODIUM SERPL-SCNC: 142 MMOL/L (ref 135–147)
T WAVE AXIS: 35 DEGREES
VENTRICULAR RATE: 89 BPM
WBC # BLD AUTO: 10.09 THOUSAND/UL (ref 4.31–10.16)

## 2022-10-20 PROCEDURE — 96374 THER/PROPH/DIAG INJ IV PUSH: CPT

## 2022-10-20 PROCEDURE — 99284 EMERGENCY DEPT VISIT MOD MDM: CPT

## 2022-10-20 PROCEDURE — 80053 COMPREHEN METABOLIC PANEL: CPT | Performed by: EMERGENCY MEDICINE

## 2022-10-20 PROCEDURE — 36415 COLL VENOUS BLD VENIPUNCTURE: CPT | Performed by: EMERGENCY MEDICINE

## 2022-10-20 PROCEDURE — 0241U HB NFCT DS VIR RESP RNA 4 TRGT: CPT | Performed by: EMERGENCY MEDICINE

## 2022-10-20 PROCEDURE — 85025 COMPLETE CBC W/AUTO DIFF WBC: CPT | Performed by: EMERGENCY MEDICINE

## 2022-10-20 PROCEDURE — 93010 ELECTROCARDIOGRAM REPORT: CPT | Performed by: INTERNAL MEDICINE

## 2022-10-20 PROCEDURE — 74176 CT ABD & PELVIS W/O CONTRAST: CPT

## 2022-10-20 PROCEDURE — 84484 ASSAY OF TROPONIN QUANT: CPT | Performed by: EMERGENCY MEDICINE

## 2022-10-20 PROCEDURE — 93005 ELECTROCARDIOGRAM TRACING: CPT

## 2022-10-20 PROCEDURE — 99285 EMERGENCY DEPT VISIT HI MDM: CPT | Performed by: EMERGENCY MEDICINE

## 2022-10-20 PROCEDURE — 83690 ASSAY OF LIPASE: CPT | Performed by: EMERGENCY MEDICINE

## 2022-10-20 PROCEDURE — 96375 TX/PRO/DX INJ NEW DRUG ADDON: CPT

## 2022-10-20 PROCEDURE — 71250 CT THORAX DX C-: CPT

## 2022-10-20 RX ORDER — DICYCLOMINE HCL 20 MG
20 TABLET ORAL 2 TIMES DAILY PRN
Qty: 20 TABLET | Refills: 0 | Status: SHIPPED | OUTPATIENT
Start: 2022-10-20

## 2022-10-20 RX ORDER — MAGNESIUM HYDROXIDE/ALUMINUM HYDROXICE/SIMETHICONE 120; 1200; 1200 MG/30ML; MG/30ML; MG/30ML
30 SUSPENSION ORAL ONCE
Status: COMPLETED | OUTPATIENT
Start: 2022-10-20 | End: 2022-10-20

## 2022-10-20 RX ORDER — FENTANYL CITRATE 50 UG/ML
25 INJECTION, SOLUTION INTRAMUSCULAR; INTRAVENOUS ONCE
Status: COMPLETED | OUTPATIENT
Start: 2022-10-20 | End: 2022-10-20

## 2022-10-20 RX ORDER — ACETAMINOPHEN 325 MG/1
650 TABLET ORAL ONCE
Status: COMPLETED | OUTPATIENT
Start: 2022-10-20 | End: 2022-10-20

## 2022-10-20 RX ORDER — ONDANSETRON 4 MG/1
4 TABLET, ORALLY DISINTEGRATING ORAL EVERY 8 HOURS PRN
Qty: 10 TABLET | Refills: 0 | Status: SHIPPED | OUTPATIENT
Start: 2022-10-20

## 2022-10-20 RX ORDER — ONDANSETRON 2 MG/ML
4 INJECTION INTRAMUSCULAR; INTRAVENOUS ONCE
Status: COMPLETED | OUTPATIENT
Start: 2022-10-20 | End: 2022-10-20

## 2022-10-20 RX ORDER — SUCRALFATE 1 G/1
1 TABLET ORAL ONCE
Status: COMPLETED | OUTPATIENT
Start: 2022-10-20 | End: 2022-10-20

## 2022-10-20 RX ADMIN — ONDANSETRON 4 MG: 2 INJECTION INTRAMUSCULAR; INTRAVENOUS at 04:51

## 2022-10-20 RX ADMIN — FENTANYL CITRATE 25 MCG: 50 INJECTION INTRAMUSCULAR; INTRAVENOUS at 06:17

## 2022-10-20 RX ADMIN — SUCRALFATE 1 G: 1 TABLET ORAL at 06:44

## 2022-10-20 RX ADMIN — ACETAMINOPHEN 650 MG: 325 TABLET, FILM COATED ORAL at 06:43

## 2022-10-20 RX ADMIN — ALUMINUM HYDROXIDE, MAGNESIUM HYDROXIDE, AND SIMETHICONE 30 ML: 200; 200; 20 SUSPENSION ORAL at 06:44

## 2022-10-20 NOTE — ED NOTES
PATIENT AMBULATORY TO W/R W/STAFF  [facility]    AVS REVIEWED, UNDERSTANDING STATED     Felipe Madrid RN  10/20/22 7113

## 2022-10-20 NOTE — ED PROVIDER NOTES
History  Chief Complaint   Patient presents with   • Vomiting     C/o vomiting and epigastric pain x2 days  Denies fevers or chills  65 y o  male presents with one day of numerous episodes of vomiting associated with generalized abdominal and chest pain  Patient describes burning pain that came on suddenly and continues in the ER  Patient states drinking or eating aggravates the pain and nothing alleviates it  Patient affirms diarrhea but he states this is chronic in nature and unchanged today  Patient denies any urinary symptoms  Patient denies testicular pain or penile discharge  ROS: Patient associates congestion that has been ongoing for weeks despite treatment with azithromycin by his PCP; patient denies fever/chills, dyspnea, constipation, diaphoresis, groin pain, dysuria, hematuria, melena, or back/neck pain  All other systems reviewed and negative  Patient denies contacts with similar symptoms  Patient denies any recent international travel or trauma  Patient notes history of two prior hernia repair and a cholecystectomy  Focused Objective Exam:  Abdomen:  Inspection of an abdomen without erythema, rashes or ecchymosis noted  No abdominal pulsations noted  Normal bowel sounds with no bruit auscultated  Soft abdomen  Palpitation noted generalized tenderness; tenderness noted but not maximal over McBurney's point  No masses or pulsatile aorta noted on examination  No rebound or guarding noted on examination, non-peritoneal exam     Back:  No rash or signs of herpes zoster  Skin:  No ecchymosis of the umbilicus (negative [place]'s sign) or flank (negative [place]'s sign)  Warm and dry  Medical Decision Making  Abdominal pain with a broad differential   Will establish IV access and make patient NPO considering possibility of surgical intervention required  Will initiate symptomatic management including intravenous fluids      As patient has history of risk factors for ACS, will obtain EKG and troponin to evaluate for potential referred pain  Differential includes significant likelihood of intra-abdominal pathology, including concerns for potential infectious etiology or obstruction  Will obtain CBC to evaluate for anemia and leukocytosis  Will obtain CMP to evaluate electrolytes, renal, biliary, and hepatic function  Will obtain lipase to evaluate for potential pancreatitis  Based on patient's age, history, physical exam and presenting complaint, will obtain CT imaging of patient's abdomen and pelvis to further evaluate for possible intraabdominal pathology  Patient has a reported contrast allergy unfortunately  Considering associated chest symptoms, will include imaging of patient's chest more carefully evaluate potential etiologies of symptoms  History provided by:  Patient  Vomiting  Severity:  Moderate  Timing:  Constant  Quality:  Stomach contents  Progression:  Unchanged  Relieved by:  Nothing  Worsened by:  Liquids  Associated symptoms: abdominal pain, chills and diarrhea    Associated symptoms: no arthralgias, no cough, no fever, no headaches, no myalgias, no sore throat and no URI    Risk factors: prior abdominal surgery        Prior to Admission Medications   Prescriptions Last Dose Informant Patient Reported? Taking? Probiotic Product (Bacid) TABS  Self Yes No   Sig: Take 1 tablet by mouth in the morning and 1 tablet in the evening  albuterol (2 5 mg/3 mL) 0 083 % nebulizer solution  Self Yes No   Sig: Take 2 5 mg by nebulization every 6 (six) hours as needed for wheezing or shortness of breath   atorvastatin (LIPITOR) 40 mg tablet  Self Yes No   Sig: Take 40 mg by mouth in the morning  dicyclomine (BENTYL) 10 mg capsule  Self Yes No   Sig: Take 10 mg by mouth in the morning and 10 mg at noon and 10 mg in the evening     docusate sodium (COLACE) 100 mg capsule   No No   Sig: Take 1 capsule (100 mg total) by mouth in the morning and 1 capsule (100 mg total) in the evening  Do all this for 7 days  lisinopril (ZESTRIL) 10 mg tablet  Self Yes No   Sig: Take 10 mg by mouth in the morning  metoprolol tartrate (LOPRESSOR) 25 mg tablet  Self Yes No   Sig: Take 25 mg by mouth every 12 (twelve) hours   nitroglycerin (NITROSTAT) 0 3 mg SL tablet  Self Yes No   Sig: Place 0 3 mg under the tongue every 5 (five) minutes as needed for chest pain   omeprazole (PriLOSEC) 20 mg delayed release capsule  Self Yes No   Sig: Take 20 mg by mouth in the morning  ondansetron (ZOFRAN-ODT) 4 mg disintegrating tablet  Self Yes No   Sig: Take 4 mg by mouth   oxyCODONE-acetaminophen (PERCOCET) 5-325 mg per tablet  Self Yes No   Sig: TAKE 1 TAB AS NEEDED EVERY 6 HOURS DAY 1, 1 TAB EVERY 12 HOURS DAYS 2 1 TAB ONCE ON DAY 3    raNITIdine HCl (ZANTAC 75 PO)  Self Yes No   Sig: Take by mouth   traMADol-acetaminophen (ULTRACET) 37 5-325 mg per tablet   No No   Sig: Take 1 tablet by mouth every 8 (eight) hours as needed for moderate pain      Facility-Administered Medications: None       Past Medical History:   Diagnosis Date   • Angina pectoris (HCC)    • Asthma    • Cancer ([facility] [address] )     liver   • Colon polyp    • Coronary artery disease    • [facility]    • Hyperlipidemia    • Hypertension    • Liver disease    • Shortness of breath        Past Surgical History:   Procedure Laterality Date   • BACK SURGERY     • CHOLECYSTECTOMY     • COLONOSCOPY     • CORONARY ANGIOPLASTY WITH STENT PLACEMENT     • HERNIA REPAIR     • HERNIA REPAIR Left 5/17/2022    Procedure: laparoscopic attempted converted to open LEFT inguinal hernia repair with mesh;  Surgeon: Pedro Henry MD;  Location: [facility];  Service: General   • INGUINAL HERNIA REPAIR Left 05/17/2022    laparoscopic attempted converted to open L inguinal hernia repair w/mesh, Dr Ramiro Mullins   • KNEE ARTHROSCOPY     • TONSILLECTOMY         No family history on file    I have reviewed and agree with the history as documented  E-Cigarette/Vaping   • E-Cigarette Use Never User      E-Cigarette/Vaping Substances   • Nicotine No    • THC No    • CBD No    • Flavoring No    • Other No    • Unknown No      Social History     Tobacco Use   • Smoking status: Never Smoker   • Smokeless tobacco: Never Used   Vaping Use   • Vaping Use: Never used   Substance Use Topics   • Alcohol use: Never   • Drug use: Never       Review of Systems   Constitutional: Positive for chills  Negative for fever  HENT: Positive for congestion  Negative for sore throat  Respiratory: Negative for cough  Cardiovascular: Positive for chest pain  Gastrointestinal: Positive for abdominal pain, diarrhea and vomiting  Musculoskeletal: Negative for arthralgias and myalgias  Neurological: Negative for headaches  All other systems reviewed and are negative  Physical Exam  Physical Exam  Vitals reviewed  HENT:      Head: Atraumatic  Eyes:      General: No scleral icterus  Cardiovascular:      Rate and Rhythm: Normal rate  Pulmonary:      Effort: Pulmonary effort is normal    Abdominal:      General: There is no distension  Tenderness: There is abdominal tenderness  There is no guarding or rebound  Musculoskeletal:         General: No deformity  Cervical back: Neck supple  Skin:     General: Skin is warm and dry  Neurological:      General: No focal deficit present  Mental Status: He is alert     Psychiatric:         Mood and Affect: Mood normal          Vital Signs  ED Triage Vitals [10/20/22 0440]   Temperature Pulse Respirations Blood Pressure SpO2   98 1 °F (36 7 °C) 88 22 142/86 95 %      Temp Source Heart Rate Source Patient Position - Orthostatic VS BP Location FiO2 (%)   Oral -- -- -- --      Pain Score       --           Vitals:    10/20/22 0440   BP: 142/86   Pulse: 88         Visual Acuity      ED Medications  Medications   ondansetron (ZOFRAN) injection 4 mg (has no administration in time range) Diagnostic Studies  Results Reviewed     Procedure Component Value Units Date/Time    CBC and differential [[id number]]     Lab Status: No result Specimen: Blood     Comprehensive metabolic panel [[id number]]     Lab Status: No result Specimen: Blood     HS Troponin 0hr (reflex protocol) [[id number]]     Lab Status: No result Specimen: Blood     Lipase [[id number]]     Lab Status: No result Specimen: Blood     FLU/RSV/COVID - if FLU/RSV clinically relevant [[id number]]     Lab Status: No result Specimen: Nares from Nose                  No orders to display              Procedures  Procedures         ED Course  ED Course as of 10/20/22 0509   u Oct 20, 2022   0508 EKG demonstrates normal sinus rhythm with no acute ST segment changes  This appears similar to the prior EKG                                             MDM    Disposition  Final diagnoses:   None     ED Disposition     None      Follow-up Information    None         Patient's Medications   Discharge Prescriptions    No medications on file       No discharge procedures on file      PDMP Review       Value Time User    PDMP Reviewed  Yes 5/17/2022  3:11 PM Boyd Henderson PA-C          ED Provider  Electronically Signed by with  clinical observations, patient history, and epidemiological information  This test has not been FDA cleared or approved  This test has been authorized by FDA under an Emergency Use Authorization  (EUA)  This test is only authorized for the duration of time the  declaration that circumstances exist justifying the authorization of the  emergency use of an in vitro diagnostic tests for detection of SARS-CoV-2  virus and/or diagnosis of COVID-19 infection under section 564(b)(1) of  the Act, 21 U  S C  295AGL-3(P)(2), unless the authorization is terminated  or revoked sooner  The test has been validated but independent review by FDA  and CLIA is pending  Test performed using [employer] GeneXpert: This RT-PCR assay targets N2,  a region unique to SARS-CoV-2  A conserved region in the E-gene was chosen  for pan-Sarbecovirus detection which includes SARS-CoV-2  According to CMS-2020-01-R, this platform meets the definition of high-throughput technology      HS Troponin 0hr (reflex protocol) [[id number]]  (Normal) Collected: 10/20/22 0451    Lab Status: Final result Specimen: Blood from Arm, Right Updated: 10/20/22 0531     hs TnI 0hr 3 ng/L     Comprehensive metabolic panel [[id number]] Collected: 10/20/22 0451    Lab Status: Final result Specimen: Blood from Arm, Right Updated: 10/20/22 0531     Sodium 142 mmol/L      Potassium 4 0 mmol/L      Chloride 106 mmol/L      CO2 29 mmol/L      ANION GAP 7 mmol/L      BUN 17 mg/dL      Creatinine 0 90 mg/dL      Glucose 133 mg/dL      Calcium 8 9 mg/dL      AST 17 U/L      ALT 18 U/L      Alkaline Phosphatase 84 U/L      Total Protein 7 3 g/dL      Albumin 3 6 g/dL      Total Bilirubin 0 71 mg/dL      eGFR 91 ml/min/1 73sq m     Narrative:      Zach guidelines for Chronic Kidney Disease (CKD):   •  Stage 1 with normal or high GFR (GFR > 90 mL/min/1 73 square meters)  •  Stage 2 Mild CKD (GFR = 60-89 mL/min/1 73 square meters)  •  Stage 3A Moderate CKD (GFR = 45-59 mL/min/1 73 square meters)  •  Stage 3B Moderate CKD (GFR = 30-44 mL/min/1 73 square meters)  •  Stage 4 Severe CKD (GFR = 15-29 mL/min/1 73 square meters)  •  Stage 5 End Stage CKD (GFR <15 mL/min/1 73 square meters)  Note: GFR calculation is accurate only with a steady state creatinine    Lipase [[id number]]  (Abnormal) Collected: 10/20/22 0451    Lab Status: Final result Specimen: Blood from Arm, Right Updated: 10/20/22 0531     Lipase 72 u/L     CBC and differential [[id number]]  (Abnormal) Collected: 10/20/22 0451    Lab Status: Final result Specimen: Blood from Arm, Right Updated: 10/20/22 0506     WBC 10 09 Thousand/uL      RBC 5 97 Million/uL      Hemoglobin 11 4 g/dL      Hematocrit 36 3 %      MCV 61 fL      MCH 19 1 pg      MCHC 31 4 g/dL      RDW 19 4 %      MPV 9 3 fL      Platelets 691 Thousands/uL      nRBC 1 /100 WBCs      Neutrophils Relative 63 %      Immat GRANS % 2 %      Lymphocytes Relative 24 %      Monocytes Relative 7 %      Eosinophils Relative 3 %      Basophils Relative 1 %      Neutrophils Absolute 6 48 Thousands/µL      Immature Grans Absolute 0 15 Thousand/uL      Lymphocytes Absolute 2 42 Thousands/µL      Monocytes Absolute 0 67 Thousand/µL      Eosinophils Absolute 0 30 Thousand/µL      Basophils Absolute 0 07 Thousands/µL                  CT chest abdomen pelvis wo contrast   Final Result by Tara Guevara MD (10/20 3994)      No acute pathology  Stable hepatic hemangiomas  Colonic diverticulosis  Workstation performed: [id number]                    Procedures  Procedures         ED Course  ED Course as of 10/27/22 1500   Thu Oct 20, 2022   0508 EKG demonstrates normal sinus rhythm with no acute ST segment changes  This appears similar to the prior EKG   0658 Discussed findings with the patient  No clear etiology for patient's symptoms but discussed potential etiologies, emphasizing diagnostic uncertainty    Patient's symptoms improved following treatment  Will attempt oral challenge and obtain delta troponin  If negative, discussed close follow-up with primary care for continued evaluation and treatment  Discussed return precautions in detail  HEART Risk Score    Flowsheet Row Most Recent Value   Heart Score Risk Calculator    History 0 Filed at: 10/20/2022 0616   ECG 1 Filed at: 10/20/2022 2467   Age 1 Filed at: 10/20/2022 7474   Risk Factors 2 Filed at: 10/20/2022 0616   Troponin 0 Filed at: 10/20/2022 6968   HEART Score 4 Filed at: 10/20/2022 4193                                      MDM    Disposition  Final diagnoses:   Vomiting   Abdominal pain   Chest pain     Time reflects when diagnosis was documented in both MDM as applicable and the Disposition within this note     Time User Action Codes Description Comment    10/20/2022  6:14 AM Flory Jumper Add [R11 10] Vomiting     10/20/2022  6:14 AM Flory Jumper Add [R10 9] Abdominal pain     10/20/2022  6:14 AM Flory Jumper Add [R07 9] Chest pain       ED Disposition     ED Disposition   Discharge    Condition   Stable    Date/Time   u Oct 20, 2022  6:14 AM    Comment   Curt Mancia discharge to home/self care  Follow-up Information     Follow up With Specialties Details Why Contact Info Additional Information    Viviana Crenshaw MD  Schedule an appointment as soon as possible for a visit in 3 days Follow up and reassessment   [facility] Keenan Reynolds  [facility] Emergency Department Emergency Medicine Go to  If symptoms worsen [address] [facility] [id number] [facility] Emergency Department, [address], [place], [address]          Discharge Medication List as of 10/20/2022  6:16 AM      START taking these medications    Details   dicyclomine (BENTYL) 20 mg tablet Take 1 tablet (20 mg total) by mouth 2 (two) times a day as needed (Abdominal pain), Starting Thu 10/20/2022, Print      ondansetron (ZOFRAN-ODT) 4 mg disintegrating tablet Take 1 tablet (4 mg total) by mouth every 8 (eight) hours as needed for nausea or vomiting, Starting Thu 10/20/2022, Print         CONTINUE these medications which have NOT CHANGED    Details   albuterol (2 5 mg/3 mL) 0 083 % nebulizer solution Take 2 5 mg by nebulization every 6 (six) hours as needed for wheezing or shortness of breath, Historical Med      atorvastatin (LIPITOR) 40 mg tablet Take 40 mg by mouth in the morning , Historical Med      dicyclomine (BENTYL) 10 mg capsule Take 10 mg by mouth in the morning and 10 mg at noon and 10 mg in the evening , Starting Wed 1/12/2022, Historical Med      docusate sodium (COLACE) 100 mg capsule Take 1 capsule (100 mg total) by mouth in the morning and 1 capsule (100 mg total) in the evening  Do all this for 7 days  , Starting Tue 5/17/2022, Until Tue 5/24/2022, Normal      lisinopril (ZESTRIL) 10 mg tablet Take 10 mg by mouth in the morning , Starting Tue 12/14/2021, Historical Med      metoprolol tartrate (LOPRESSOR) 25 mg tablet Take 25 mg by mouth every 12 (twelve) hours, Historical Med      nitroglycerin (NITROSTAT) 0 3 mg SL tablet Place 0 3 mg under the tongue every 5 (five) minutes as needed for chest pain, Historical Med      omeprazole (PriLOSEC) 20 mg delayed release capsule Take 20 mg by mouth in the morning , Historical Med      oxyCODONE-acetaminophen (PERCOCET) 5-325 mg per tablet TAKE 1 TAB AS NEEDED EVERY 6 HOURS DAY 1, 1 TAB EVERY 12 HOURS DAYS 2 1 TAB ONCE ON DAY 3 , Historical Med      Probiotic Product (Bacid) TABS Take 1 tablet by mouth in the morning and 1 tablet in the evening , Starting Tue 11/23/2021, Historical Med      raNITIdine HCl (ZANTAC 75 PO) Take by mouth, Historical Med      traMADol-acetaminophen (ULTRACET) 37 5-325 mg per tablet Take 1 tablet by mouth every 8 (eight) hours as needed for moderate pain, Starting Wed 6/1/2022, Normal No discharge procedures on file      PDMP Review       Value Time User    PDMP Reviewed  Yes 5/17/2022  3:11 PM Tessa Qureshi PA-C          ED Provider  Electronically Signed by           Dc Brown MD  10/27/22 0016

## 2022-11-22 ENCOUNTER — APPOINTMENT (EMERGENCY)
Dept: CT IMAGING | Facility: HOSPITAL | Age: 62
End: 2022-11-22

## 2022-11-22 ENCOUNTER — HOSPITAL ENCOUNTER (EMERGENCY)
Facility: HOSPITAL | Age: 62
Discharge: HOME | End: 2022-11-22
Attending: EMERGENCY MEDICINE

## 2022-11-22 ENCOUNTER — APPOINTMENT (EMERGENCY)
Dept: RADIOLOGY | Facility: HOSPITAL | Age: 62
End: 2022-11-22

## 2022-11-22 VITALS
TEMPERATURE: 98.4 F | HEART RATE: 77 BPM | OXYGEN SATURATION: 95 % | SYSTOLIC BLOOD PRESSURE: 134 MMHG | RESPIRATION RATE: 18 BRPM | DIASTOLIC BLOOD PRESSURE: 66 MMHG

## 2022-11-22 DIAGNOSIS — S09.90XA INJURY OF HEAD, INITIAL ENCOUNTER: ICD-10-CM

## 2022-11-22 DIAGNOSIS — M25.461 EFFUSION OF RIGHT KNEE: ICD-10-CM

## 2022-11-22 DIAGNOSIS — S49.90XA SHOULDER INJURY: Primary | ICD-10-CM

## 2022-12-02 NOTE — ED PROVIDER NOTES
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 Taking? Probiotic Product (Bacid) TABS   Yes No   Sig: Take 1 tablet by mouth in the morning and 1 tablet in the evening  albuterol (2 5 mg/3 mL) 0 083 % nebulizer solution   Yes No   Sig: Take 2 5 mg by nebulization every 6 (six) hours as needed for wheezing or shortness of breath   atorvastatin (LIPITOR) 40 mg tablet   Yes No   Sig: Take 40 mg by mouth in the morning  dicyclomine (BENTYL) 10 mg capsule   Yes No   Sig: Take 10 mg by mouth in the morning and 10 mg at noon and 10 mg in the evening  dicyclomine (BENTYL) 20 mg tablet   No No   Sig: Take 1 tablet (20 mg total) by mouth 2 (two) times a day as needed (Abdominal pain)   docusate sodium (COLACE) 100 mg capsule   No No   Sig: Take 1 capsule (100 mg total) by mouth in the morning and 1 capsule (100 mg total) in the evening  Do all this for 7 days  lisinopril (ZESTRIL) 10 mg tablet   Yes No   Sig: Take 10 mg by mouth in the morning     metoprolol tartrate (LOPRESSOR) 25 mg tablet   Yes No   Sig: Take 25 mg by mouth every 12 (twelve) hours   nitroglycerin (NITROSTAT) 0 3 mg SL tablet   Yes No   Sig: Place 0 3 mg under the tongue every 5 (five) minutes as needed for chest pain   omeprazole (PriLOSEC) 20 mg delayed release capsule   Yes No   Sig: Take 20 mg by mouth in the morning  ondansetron (ZOFRAN-ODT) 4 mg disintegrating tablet   No No   Sig: Take 1 tablet (4 mg total) by mouth every 8 (eight) hours as needed for nausea or vomiting   oxyCODONE-acetaminophen (PERCOCET) 5-325 mg per tablet   Yes No   Sig: TAKE 1 TAB AS NEEDED EVERY 6 HOURS DAY 1, 1 TAB EVERY 12 HOURS DAYS 2 1 TAB ONCE ON DAY 3    raNITIdine HCl (ZANTAC 75 PO)   Yes No   Sig: Take by mouth   traMADol-acetaminophen (ULTRACET) 37 5-325 mg per tablet   No No   Sig: Take 1 tablet by mouth every 8 (eight) hours as needed for moderate pain      Facility-Administered Medications: None       Past Medical History:   Diagnosis Date   • Angina pectoris (HCC)    • Asthma    • Cancer ([facility] )     liver   • Colon polyp    • Coronary artery disease    • Heart attack ([facility] )    • Hyperlipidemia    • Hypertension    • Liver disease    • Shortness of breath        Past Surgical History:   Procedure Laterality Date   • BACK SURGERY     • CHOLECYSTECTOMY     • COLONOSCOPY     • CORONARY ANGIOPLASTY WITH STENT PLACEMENT     • HERNIA REPAIR     • HERNIA REPAIR Left 5/17/2022    Procedure: laparoscopic attempted converted to open LEFT inguinal hernia repair with mesh;  Surgeon: Katelynn Boston MD;  Location: [facility];  Service: General   • INGUINAL HERNIA REPAIR Left 05/17/2022    laparoscopic attempted converted to open L inguinal hernia repair w/mesh, Dr Andrés Kirby   • KNEE ARTHROSCOPY     • TONSILLECTOMY         History reviewed  No pertinent family history  I have reviewed and agree with the history as documented      E-Cigarette/Vaping   • E-Cigarette Use Never User      E-Cigarette/Vaping Substances   • Nicotine No    • THC No    • CBD No    • Flavoring No    • Other No    • Unknown No      Social History     Tobacco Use   • Smoking status: Never   • Smokeless tobacco: Never   Vaping Use   • Vaping Use: Never used   Substance Use Topics   • Alcohol use: Never   • Drug use: Never       Review of Systems   Constitutional: Negative for activity change, appetite change and fever  HENT: Negative for congestion, ear pain, rhinorrhea and sore throat  Eyes: Negative for photophobia, pain, redness and visual disturbance  Respiratory: Negative for cough, shortness of breath and wheezing  Cardiovascular: Negative for chest pain and palpitations  Gastrointestinal: Negative for abdominal pain, diarrhea, nausea and vomiting  Endocrine: Negative for polyuria  Genitourinary: Negative for difficulty urinating, dysuria, frequency and urgency  Musculoskeletal: Positive for arthralgias (right shoulder, right knee)  Negative for myalgias  Skin: Negative for color change and rash  Allergic/Immunologic: Negative for immunocompromised state  Neurological: Negative for dizziness, syncope, light-headedness and headaches  Hematological: Does not bruise/bleed easily  Psychiatric/Behavioral: Negative for confusion  All other systems reviewed and are negative  Physical Exam  Physical Exam  Vitals and nursing note reviewed  Constitutional:       General: He is not in acute distress  Appearance: He is well-developed  HENT:      Head: Normocephalic and atraumatic  Nose: Nose normal    Eyes:      General: No scleral icterus  Extraocular Movements: Extraocular movements intact  Conjunctiva/sclera: Conjunctivae normal       Pupils: Pupils are equal, round, and reactive to light  Comments: Tender and ecchymosis superior aspect of left orbit  EOMI  Cardiovascular:      Rate and Rhythm: Normal rate and regular rhythm  Pulses: Intact distal pulses  Heart sounds: Normal heart sounds  Pulmonary:      Effort: Pulmonary effort is normal  No respiratory distress  Breath sounds: Normal breath sounds  No stridor  No wheezing  Abdominal:      General: There is no distension  Palpations: Abdomen is soft  Tenderness: There is no abdominal tenderness  There is no guarding or rebound  Musculoskeletal:         General: No deformity or edema  Cervical back: Normal range of motion and neck supple  No rigidity or tenderness (neck clear by nexus criteria)  Skin:     General: Skin is warm and dry  Findings: No rash  Neurological:      Mental Status: He is alert and oriented to person, place, and time  Psychiatric:         Mood and Affect: Mood and affect normal          Thought Content: Thought content normal          Vital Signs  ED Triage Vitals   Temperature Pulse Respirations Blood Pressure SpO2   11/22/22 1247 11/22/22 1247 11/22/22 1247 11/22/22 1247 11/22/22 1247   98 4 °F (36 9 °C) 104 18 141/84 98 %      Temp src Heart Rate Source Patient Position - Orthostatic VS BP Location FiO2 (%)   -- 11/22/22 1426 11/22/22 1426 11/22/22 1426 --    Monitor Lying Right arm       Pain Score       11/22/22 1247       10 - Worst Possible Pain           Vitals:    11/22/22 1247 11/22/22 1426 11/22/22 1558   BP: 141/84 135/73 134/66   Pulse: 104 90 77   Patient Position - Orthostatic VS:  Lying Lying         Visual Acuity      ED Medications  Medications - No data to display    Diagnostic Studies  Results Reviewed     None                 CT head without contrast   Final Result by Daniel Drummond DO (11/22 1550)      No acute intracranial abnormality  Paranasal sinus mucosal thickening within the maxillary sinuses  Workstation performed: [id number]         CT facial bones without contrast   Final Result by Velia Freeman DO (11/22 1552)      Deformity of the nasal bones with slight deviation towards the patient's left suggesting old injury  No acute osseous abnormality identified  Workstation performed: [id number]         XR shoulder 2+ views RIGHT   ED Interpretation by Jannet Chapa MD (11/22 1512)   No fx or dislocation      Final Result by Analisa Brandon MD (11/23 0064)      No acute osseous abnormality  Workstation performed: [id number]         XR knee 4+ views Right injury   ED Interpretation by Alena Manjarrez MD (11/22 1512)   Effusion  No fracture  Final Result by Harry Nixon MD (11/23 2685)      No acute osseous abnormality  Workstation performed: [id number]                    Procedures  Procedures         ED Course                                             MDM    Disposition  Final diagnoses:   Shoulder injury   Injury of head, initial encounter   Effusion of right knee     Time reflects when diagnosis was documented in both MDM as applicable and the Disposition within this note     Time User Action Codes Description Comment    11/22/2022  3:57 PM Dallas Ayon 69 Shoulder injury     11/22/2022  3:57 PM Nina Kelsey Add [S09 [id number]] Injury of head, initial encounter     11/22/2022  3:58 PM Nina Kelsey Add [D52 [id number]] Effusion of right knee       ED Disposition     ED Disposition   Discharge    Condition   Stable    Date/Time   Tue Nov 22, 2022  3:56 PM    Comment   Nahid Mcneil discharge to home/self care                 Follow-up Information     Follow up With Specialties Details Why Contact Info Additional [address] Orthopedic Surgery In 5 days For re-evaluation and follow-up for this visit - right shoulder injury [address]  [address], [address] [address] [place], [place], [facility]          Discharge Medication List as of 11/22/2022  3:59 PM      CONTINUE these medications which have NOT CHANGED    Details   albuterol (2 5 mg/3 mL) 0 083 % nebulizer solution Take 2 5 mg by nebulization every 6 (six) hours as needed for wheezing or shortness of breath, Historical Med      atorvastatin (LIPITOR) 40 mg tablet Take 40 mg by mouth in the morning , Historical Med      dicyclomine (BENTYL) 10 mg capsule Take 10 mg by mouth in the morning and 10 mg at noon and 10 mg in the evening , Starting Wed 1/12/2022, Historical Med      dicyclomine (BENTYL) 20 mg tablet Take 1 tablet (20 mg total) by mouth 2 (two) times a day as needed (Abdominal pain), Starting Thu 10/20/2022, Print      docusate sodium (COLACE) 100 mg capsule Take 1 capsule (100 mg total) by mouth in the morning and 1 capsule (100 mg total) in the evening  Do all this for 7 days  , Starting Tue 5/17/2022, Until Tue 5/24/2022, Normal      lisinopril (ZESTRIL) 10 mg tablet Take 10 mg by mouth in the morning , Starting Tue 12/14/2021, Historical Med      metoprolol tartrate (LOPRESSOR) 25 mg tablet Take 25 mg by mouth every 12 (twelve) hours, Historical Med      nitroglycerin (NITROSTAT) 0 3 mg SL tablet Place 0 3 mg under the tongue every 5 (five) minutes as needed for chest pain, Historical Med      omeprazole (PriLOSEC) 20 mg delayed release capsule Take 20 mg by mouth in the morning , Historical Med      ondansetron (ZOFRAN-ODT) 4 mg disintegrating tablet Take 1 tablet (4 mg total) by mouth every 8 (eight) hours as needed for nausea or vomiting, Starting Thu 10/20/2022, Print      oxyCODONE-acetaminophen (PERCOCET) 5-325 mg per tablet TAKE 1 TAB AS NEEDED EVERY 6 HOURS DAY 1, 1 TAB EVERY 12 HOURS DAYS 2 1 TAB ONCE ON DAY 3 , Historical Med      Probiotic Product (Bacid) TABS Take 1 tablet by mouth in the morning and 1 tablet in the evening , Starting Tue 11/23/2021, Historical Med      raNITIdine HCl (ZANTAC 75 PO) Take by mouth, Historical Med      traMADol-acetaminophen (ULTRACET) 37 5-325 mg per tablet Take 1 tablet by mouth every 8 (eight) hours as needed for moderate pain, Starting Wed 6/1/2022, Normal                 PDMP Review       Value Time User    PDMP Reviewed  Yes 5/17/2022  3:11 PM Devendra Wu PA-C          ED Provider  Electronically Signed by           Josefina Ospina Junior Potter MD  12/01/22 0014

## 2023-01-11 NOTE — ASSESSMENT & PLAN NOTE
Appears to be at baseline, patient does have chronic respiratory failure with hypoxia due to this, chronically on 2 L of oxygen  Continue albuterol as needed  There is a chest x-ray that has been ordered by primary team, follow-up
Chronically on lisinopril, metoprolol  Continue metoprolol for the time being  Hold lisinopril for now and ensure kidney functions appropriately stable prior to recontinuation after surgery 
Denies chest pain currently or previously  Continue metoprolol  Not on aspirin as per his outpatient cardiologist allegedly  Also mentions history of "ablation" presumably for atrial fibrillation  Continue statin
Hemoglobin currently at 9 6 this appears to be close to his baseline    Patient will benefit from iron supplementation
Mentions history of liver cancer allegedly treated with radiation in the past   CMP currently appears unremarkable
Patient presented with episodic abdominal pain  CT done recently did show L inguinal hernia    Patient hospitalized under the surgical service    Plan for left inguinal hernia repair tomorrow tentatively  Patient appears to be at baseline status physically and respiratory denies angina or dyspnea on exertion  Revised cardiac risk index is 1
What Type Of Note Output Would You Prefer (Optional)?: Standard Output
How Severe Is Your Skin Lesion?: moderate
Has Your Skin Lesion Been Treated?: not been treated
Is This A New Presentation, Or A Follow-Up?: Skin Lesions

## 2023-08-01 ENCOUNTER — APPOINTMENT (EMERGENCY)
Dept: CT IMAGING | Facility: HOSPITAL | Age: 63
End: 2023-08-01
Payer: COMMERCIAL

## 2023-08-01 ENCOUNTER — HOSPITAL ENCOUNTER (OUTPATIENT)
Facility: HOSPITAL | Age: 63
Setting detail: OBSERVATION
Discharge: HOME | End: 2023-08-02
Attending: EMERGENCY MEDICINE | Admitting: INTERNAL MEDICINE
Payer: COMMERCIAL

## 2023-08-01 DIAGNOSIS — R13.10 DYSPHAGIA: ICD-10-CM

## 2023-08-01 DIAGNOSIS — Z98.890 S/P INGUINAL HERNIA REPAIR: ICD-10-CM

## 2023-08-01 DIAGNOSIS — Z87.19 S/P INGUINAL HERNIA REPAIR: ICD-10-CM

## 2023-08-01 DIAGNOSIS — R11.10 VOMITING: ICD-10-CM

## 2023-08-01 DIAGNOSIS — R13.10 DIFFICULTY SWALLOWING: Primary | ICD-10-CM

## 2023-08-01 LAB
4HR DELTA HS TROPONIN: 0 NG/L
ALBUMIN SERPL BCP-MCNC: 4.7 G/DL (ref 3.5–5)
ALP SERPL-CCNC: 60 U/L (ref 34–104)
ALT SERPL W P-5'-P-CCNC: 9 U/L (ref 7–52)
ANION GAP SERPL CALCULATED.3IONS-SCNC: 7 MMOL/L
AST SERPL W P-5'-P-CCNC: 14 U/L (ref 13–39)
BASOPHILS # BLD AUTO: 0.04 THOUSANDS/ÂΜL (ref 0–0.1)
BASOPHILS NFR BLD AUTO: 0 % (ref 0–1)
BILIRUB SERPL-MCNC: 1.21 MG/DL (ref 0.2–1)
BUN SERPL-MCNC: 14 MG/DL (ref 5–25)
CALCIUM SERPL-MCNC: 9.7 MG/DL (ref 8.4–10.2)
CARDIAC TROPONIN I PNL SERPL HS: 6 NG/L
CARDIAC TROPONIN I PNL SERPL HS: 6 NG/L
CHLORIDE SERPL-SCNC: 103 MMOL/L (ref 96–108)
CO2 SERPL-SCNC: 26 MMOL/L (ref 21–32)
CREAT SERPL-MCNC: 0.86 MG/DL (ref 0.6–1.3)
EOSINOPHIL # BLD AUTO: 0.1 THOUSAND/ÂΜL (ref 0–0.61)
EOSINOPHIL NFR BLD AUTO: 1 % (ref 0–6)
ERYTHROCYTE [DISTWIDTH] IN BLOOD BY AUTOMATED COUNT: 19 % (ref 11.6–15.1)
GFR SERPL CREATININE-BSD FRML MDRD: 92 ML/MIN/1.73SQ M
GLUCOSE SERPL-MCNC: 110 MG/DL (ref 65–140)
HCT VFR BLD AUTO: 38.9 % (ref 36.5–49.3)
HGB BLD-MCNC: 12.2 G/DL (ref 12–17)
IMM GRANULOCYTES # BLD AUTO: 0.05 THOUSAND/UL (ref 0–0.2)
IMM GRANULOCYTES NFR BLD AUTO: 0 % (ref 0–2)
LIPASE SERPL-CCNC: 7 U/L (ref 11–82)
LYMPHOCYTES # BLD AUTO: 1.49 THOUSANDS/ÂΜL (ref 0.6–4.47)
LYMPHOCYTES NFR BLD AUTO: 11 % (ref 14–44)
MCH RBC QN AUTO: 18.7 PG (ref 26.8–34.3)
MCHC RBC AUTO-ENTMCNC: 31.4 G/DL (ref 31.4–37.4)
MCV RBC AUTO: 60 FL (ref 82–98)
MONOCYTES # BLD AUTO: 0.57 THOUSAND/ÂΜL (ref 0.17–1.22)
MONOCYTES NFR BLD AUTO: 4 % (ref 4–12)
NEUTROPHILS # BLD AUTO: 11.79 THOUSANDS/ÂΜL (ref 1.85–7.62)
NEUTS SEG NFR BLD AUTO: 84 % (ref 43–75)
NRBC BLD AUTO-RTO: 0 /100 WBCS
PLATELET # BLD AUTO: 337 THOUSANDS/UL (ref 149–390)
PMV BLD AUTO: 8.9 FL (ref 8.9–12.7)
POTASSIUM SERPL-SCNC: 3.8 MMOL/L (ref 3.5–5.3)
PROT SERPL-MCNC: 7.6 G/DL (ref 6.4–8.4)
RBC # BLD AUTO: 6.52 MILLION/UL (ref 3.88–5.62)
SODIUM SERPL-SCNC: 136 MMOL/L (ref 135–147)
WBC # BLD AUTO: 14.04 THOUSAND/UL (ref 4.31–10.16)

## 2023-08-01 PROCEDURE — 99285 EMERGENCY DEPT VISIT HI MDM: CPT

## 2023-08-01 PROCEDURE — 85025 COMPLETE CBC W/AUTO DIFF WBC: CPT | Performed by: EMERGENCY MEDICINE

## 2023-08-01 PROCEDURE — 71250 CT THORAX DX C-: CPT

## 2023-08-01 PROCEDURE — 80053 COMPREHEN METABOLIC PANEL: CPT | Performed by: EMERGENCY MEDICINE

## 2023-08-01 PROCEDURE — 83690 ASSAY OF LIPASE: CPT | Performed by: EMERGENCY MEDICINE

## 2023-08-01 PROCEDURE — 94640 AIRWAY INHALATION TREATMENT: CPT

## 2023-08-01 PROCEDURE — 99223 1ST HOSP IP/OBS HIGH 75: CPT | Performed by: INTERNAL MEDICINE

## 2023-08-01 PROCEDURE — 94760 N-INVAS EAR/PLS OXIMETRY 1: CPT

## 2023-08-01 PROCEDURE — 84484 ASSAY OF TROPONIN QUANT: CPT | Performed by: EMERGENCY MEDICINE

## 2023-08-01 PROCEDURE — 93005 ELECTROCARDIOGRAM TRACING: CPT

## 2023-08-01 PROCEDURE — 74176 CT ABD & PELVIS W/O CONTRAST: CPT

## 2023-08-01 PROCEDURE — G1004 CDSM NDSC: HCPCS

## 2023-08-01 PROCEDURE — 36415 COLL VENOUS BLD VENIPUNCTURE: CPT | Performed by: EMERGENCY MEDICINE

## 2023-08-01 RX ORDER — ATORVASTATIN CALCIUM 40 MG/1
40 TABLET, FILM COATED ORAL DAILY
Status: DISCONTINUED | OUTPATIENT
Start: 2023-08-01 | End: 2023-08-02 | Stop reason: HOSPADM

## 2023-08-01 RX ORDER — NITROGLYCERIN 0.3 MG/1
0.3 TABLET SUBLINGUAL
Status: DISCONTINUED | OUTPATIENT
Start: 2023-08-01 | End: 2023-08-01

## 2023-08-01 RX ORDER — SUCRALFATE ORAL 1 G/10ML
1 SUSPENSION ORAL ONCE
Status: DISCONTINUED | OUTPATIENT
Start: 2023-08-01 | End: 2023-08-01

## 2023-08-01 RX ORDER — SUCRALFATE 1 G/1
1 TABLET ORAL ONCE
Status: COMPLETED | OUTPATIENT
Start: 2023-08-01 | End: 2023-08-01

## 2023-08-01 RX ORDER — ALBUTEROL SULFATE 90 UG/1
2 AEROSOL, METERED RESPIRATORY (INHALATION) EVERY 4 HOURS PRN
Status: DISCONTINUED | OUTPATIENT
Start: 2023-08-01 | End: 2023-08-02 | Stop reason: HOSPADM

## 2023-08-01 RX ORDER — LEVALBUTEROL INHALATION SOLUTION 1.25 MG/3ML
1.25 SOLUTION RESPIRATORY (INHALATION)
Status: DISCONTINUED | OUTPATIENT
Start: 2023-08-01 | End: 2023-08-02 | Stop reason: HOSPADM

## 2023-08-01 RX ORDER — LEVALBUTEROL INHALATION SOLUTION 1.25 MG/3ML
SOLUTION RESPIRATORY (INHALATION)
Status: COMPLETED
Start: 2023-08-01 | End: 2023-08-01

## 2023-08-01 RX ORDER — OXYCODONE HYDROCHLORIDE AND ACETAMINOPHEN 5; 325 MG/1; MG/1
1 TABLET ORAL 2 TIMES DAILY PRN
Status: DISCONTINUED | OUTPATIENT
Start: 2023-08-01 | End: 2023-08-02 | Stop reason: HOSPADM

## 2023-08-01 RX ORDER — LEVALBUTEROL 1.25 MG/.5ML
1.25 SOLUTION, CONCENTRATE RESPIRATORY (INHALATION)
Status: DISCONTINUED | OUTPATIENT
Start: 2023-08-01 | End: 2023-08-01

## 2023-08-01 RX ORDER — ALBUTEROL SULFATE 2.5 MG/3ML
2.5 SOLUTION RESPIRATORY (INHALATION) EVERY 6 HOURS PRN
Status: DISCONTINUED | OUTPATIENT
Start: 2023-08-01 | End: 2023-08-01

## 2023-08-01 RX ORDER — HEPARIN SODIUM 5000 [USP'U]/ML
5000 INJECTION, SOLUTION INTRAVENOUS; SUBCUTANEOUS EVERY 8 HOURS SCHEDULED
Status: DISCONTINUED | OUTPATIENT
Start: 2023-08-01 | End: 2023-08-02 | Stop reason: HOSPADM

## 2023-08-01 RX ADMIN — SUCRALFATE 1 G: 1 TABLET ORAL at 15:22

## 2023-08-01 RX ADMIN — OXYCODONE HYDROCHLORIDE AND ACETAMINOPHEN 1 TABLET: 5; 325 TABLET ORAL at 21:09

## 2023-08-01 RX ADMIN — LEVALBUTEROL HYDROCHLORIDE 1.25 MG: 1.25 SOLUTION RESPIRATORY (INHALATION) at 20:12

## 2023-08-01 RX ADMIN — HEPARIN SODIUM 5000 UNITS: 5000 INJECTION INTRAVENOUS; SUBCUTANEOUS at 16:24

## 2023-08-01 RX ADMIN — LEVALBUTEROL 1.25 MG: 1.25 SOLUTION, CONCENTRATE RESPIRATORY (INHALATION) at 20:14

## 2023-08-01 RX ADMIN — ATORVASTATIN CALCIUM 40 MG: 40 TABLET, FILM COATED ORAL at 16:24

## 2023-08-01 NOTE — ASSESSMENT & PLAN NOTE
Patient with reported episodes of sensation that the food is "being stuck" in his lower chest/esophagus and episodes of regurgitation of undigested food. Denies major issues with liquids. Also has developed some hoarseness recently.   Clinically very suspicious for achalasia  ED allegedly discussed with GI which recommended endoscopy    · Clear liquid diet  · NPO after midnight for potential EGD tomorrow  · GI consult

## 2023-08-01 NOTE — H&P
[address]  H&P  Name: Tamela Mccauley 59 y.o. male I MRN: [id number]  Unit/Bed#:  I Date of Admission: 8/1/2023   Date of Service: 8/1/2023 I Hospital Day: 0      Assessment/Plan   * Dysphagia  Assessment & Plan  Patient with reported episodes of sensation that the food is "being stuck" in his lower chest/esophagus and episodes of regurgitation of undigested food. Denies major issues with liquids. Also has developed some hoarseness recently. Clinically very suspicious for achalasia  ED allegedly discussed with GI which recommended endoscopy    · Clear liquid diet  · NPO after midnight for potential EGD tomorrow  · GI consult    Coronary artery disease  Assessment & Plan  · Reported in chart  · Denies any chest pain  · Continue metoprolol    Primary hypertension  Assessment & Plan  · Continue metoprolol  · Monitor blood pressure    COPD (chronic obstructive pulmonary disease) ([facility])  Assessment & Plan  · Appears overall stable  · Continue PRN albuterol         VTE Prophylaxis: Heparin  / sequential compression device   Code Status: FC  POLST: POLST form is not discussed and not completed at this time. Discussion with family: Patient himself is competent alert and oriented    Anticipated Length of Stay:  Patient will be admitted on an Observation basis with an anticipated length of stay of  Less than 2 midnights. Justification for Hospital Stay: Dysphagia    Total Time for Visit, including Counseling / Coordination of Care: 90 minutes. Greater than 50% of this total time spent on direct patient counseling and coordination of care. Chief Complaint:   Lower esophageal dysphagia    History of Present Illness:    Tamela Mccauley is a 59 y.o. male who presents with dysphagia. The patient does mention that for the past several days he has been having a sensation of the food being stuck in his lower esophagus and episodes of regurgitation of undigested food.   Most recently he also developed hoarseness. This occurs mostly with solids. He denies any abdominal pain. He denies any fever. He denies any issues initiating deglutition. Review of Systems:    Review of Systems   Gastrointestinal:        Dysphagia   All other systems reviewed and are negative. Past Medical and Surgical History:     Past Medical History:   Diagnosis Date   • Angina pectoris ([address])    • Asthma    • Cancer ([address])     liver   • Colon polyp    • Coronary artery disease    • Heart attack ([address])    • Hyperlipidemia    • Hypertension    • Liver disease    • Shortness of breath        Past Surgical History:   Procedure Laterality Date   • BACK SURGERY     • CHOLECYSTECTOMY     • COLONOSCOPY     • CORONARY ANGIOPLASTY WITH STENT PLACEMENT     • HERNIA REPAIR     • HERNIA REPAIR Left 5/17/2022    Procedure: laparoscopic attempted converted to open LEFT inguinal hernia repair with mesh;  Surgeon: Venia Peabody, MD;  Location: [facility];  Service: General   • INGUINAL HERNIA REPAIR Left 05/17/2022    laparoscopic attempted converted to open L inguinal hernia repair w/mesh, Dr. Braden Mendes   • KNEE ARTHROSCOPY     • TONSILLECTOMY         Meds/Allergies:    Prior to Admission medications    Medication Sig Start Date End Date Taking? Authorizing Provider   albuterol (2.5 mg/3 mL) 0.083 % nebulizer solution Take 2.5 mg by nebulization every 6 (six) hours as needed for wheezing or shortness of breath    Historical Provider, MD   atorvastatin (LIPITOR) 40 mg tablet Take 40 mg by mouth in the morning. Historical Provider, MD   dicyclomine (BENTYL) 10 mg capsule Take 10 mg by mouth in the morning and 10 mg at noon and 10 mg in the evening.  1/12/22   Historical Provider, MD   dicyclomine (BENTYL) 20 mg tablet Take 1 tablet (20 mg total) by mouth 2 (two) times a day as needed (Abdominal pain) 10/20/22   Shaun Jones MD   docusate sodium (COLACE) 100 mg capsule Take 1 capsule (100 mg total) by mouth in the morning and 1 capsule (100 mg total) in the evening. Do all this for 7 days. 5/17/22 5/24/22  Faith Almaraz PA-C   lisinopril (ZESTRIL) 10 mg tablet Take 10 mg by mouth in the morning. 12/14/21   Historical Provider, MD   metoprolol tartrate (LOPRESSOR) 25 mg tablet Take 25 mg by mouth every 12 (twelve) hours    Historical Provider, MD   nitroglycerin (NITROSTAT) 0.3 mg SL tablet Place 0.3 mg under the tongue every 5 (five) minutes as needed for chest pain    Historical Provider, MD   omeprazole (PriLOSEC) 20 mg delayed release capsule Take 20 mg by mouth in the morning. Historical Provider, MD   ondansetron (ZOFRAN-ODT) 4 mg disintegrating tablet Take 1 tablet (4 mg total) by mouth every 8 (eight) hours as needed for nausea or vomiting 10/20/22   Erin Barone MD   oxyCODONE-acetaminophen (PERCOCET) 5-325 mg per tablet TAKE 1 TAB AS NEEDED EVERY 6 HOURS DAY 1, 1 TAB EVERY 12 HOURS DAYS 2 1 TAB ONCE ON DAY 3. 2/16/22   Historical Provider, MD   Probiotic Product (Bacid) TABS Take 1 tablet by mouth in the morning and 1 tablet in the evening. 11/23/21   Historical Provider, MD   raNITIdine HCl (ZANTAC 75 PO) Take by mouth    Historical Provider, MD   traMADol-acetaminophen (ULTRACET) 37.5-325 mg per tablet Take 1 tablet by mouth every 8 (eight) hours as needed for moderate pain 6/1/22   Marlene Castro MD     I have reviewed home medications with patient personally. Allergies:    Allergies   Allergen Reactions   • Iodine - Food Allergy Anaphylaxis   • Penicillins Anaphylaxis   • Ibuprofen GI Bleeding       Social History:     Marital Status: Single     Substance Use History:   Social History     Substance and Sexual Activity   Alcohol Use Never     Social History     Tobacco Use   Smoking Status Never   Smokeless Tobacco Never     Social History     Substance and Sexual Activity   Drug Use Never       Family History:    non-contributory    Physical Exam:     Vitals:   Blood Pressure: 124/72 (08/01/23 1625)  Pulse: 89 (08/01/23 1625)  Temperature: 98.2 °F (36.8 °C) (08/01/23 1625)  Temp Source: Oral (08/01/23 1145)  Respirations: 16 (08/01/23 1625)  Weight - Scale: 81.6 kg (180 lb) (08/01/23 1145)  SpO2: 93 % (08/01/23 1625)    Physical Exam  Vitals and nursing note reviewed. Constitutional:       Appearance: Normal appearance. Comments: Male patient in bed, awake     HENT:      Head: Normocephalic and atraumatic. Right Ear: External ear normal.      Left Ear: External ear normal.      Nose: Nose normal. No congestion or rhinorrhea. Mouth/Throat:      Mouth: Mucous membranes are moist.      Pharynx: Oropharynx is clear. No oropharyngeal exudate or posterior oropharyngeal erythema. Eyes:      General: No scleral icterus. Right eye: No discharge. Left eye: No discharge. Pupils: Pupils are equal, round, and reactive to light. Neck:      Vascular: No carotid bruit. Cardiovascular:      Rate and Rhythm: Normal rate and regular rhythm. Pulses: Normal pulses. Heart sounds: No murmur heard. No friction rub. No gallop. Pulmonary:      Effort: Pulmonary effort is normal. No respiratory distress. Breath sounds: Normal breath sounds. No stridor. No wheezing, rhonchi or rales. Abdominal:      General: Abdomen is flat. Bowel sounds are normal. There is no distension. Palpations: Abdomen is soft. There is no mass. Tenderness: There is no abdominal tenderness. There is no guarding or rebound. Hernia: No hernia is present. Musculoskeletal:         General: No swelling, tenderness, deformity or signs of injury. Normal range of motion. Cervical back: Normal range of motion. No rigidity. No muscular tenderness. Lymphadenopathy:      Cervical: No cervical adenopathy. Skin:     General: Skin is warm and dry. Capillary Refill: Capillary refill takes less than 2 seconds. Coloration: Skin is not jaundiced or pale. Findings: No bruising or erythema. Neurological:      General: No focal deficit present. Mental Status: He is alert and oriented to person, place, and time. Mental status is at baseline. Cranial Nerves: No cranial nerve deficit. Sensory: No sensory deficit. Motor: No weakness. Coordination: Coordination normal.      Deep Tendon Reflexes: Reflexes normal.   Psychiatric:         Mood and Affect: Mood normal.         Behavior: Behavior normal.         Thought Content: Thought content normal.         Judgment: Judgment normal.           Additional Data:     Lab Results: I have personally reviewed pertinent reports. Results from last 7 days   Lab Units 08/01/23  1332   WBC Thousand/uL 14.04*   HEMOGLOBIN g/dL 12.2   HEMATOCRIT % 38.9   PLATELETS Thousands/uL 337   NEUTROS PCT % 84*   LYMPHS PCT % 11*   MONOS PCT % 4   EOS PCT % 1     Results from last 7 days   Lab Units 08/01/23  1332   SODIUM mmol/L 136   POTASSIUM mmol/L 3.8   CHLORIDE mmol/L 103   CO2 mmol/L 26   BUN mg/dL 14   CREATININE mg/dL 0.86   ANION GAP mmol/L 7   CALCIUM mg/dL 9.7   ALBUMIN g/dL 4.7   TOTAL BILIRUBIN mg/dL 1.21*   ALK PHOS U/L 60   ALT U/L 9   AST U/L 14   GLUCOSE RANDOM mg/dL 110                       Imaging: I have personally reviewed pertinent reports. CT chest abdomen pelvis wo contrast   Final Result by Hosea Castañeda DO (08/01 1438)      No acute pathology within the chest, abdomen or pelvis. Specifically, no evidence of bowel obstruction. Stable hepatic lesions compatible with hemangiomas on prior imaging. Colonic diverticulosis without diverticulitis. Limited study without IV or oral contrast.      Workstation performed: [id number]                 [employer] Records Reviewed: Yes     ** Please Note: This note has been constructed using a voice recognition system.  **

## 2023-08-01 NOTE — PLAN OF CARE
Problem: PAIN - ADULT  Goal: Verbalizes/displays adequate comfort level or baseline comfort level  Description: Interventions:  - Encourage patient to monitor pain and request assistance  - Assess pain using appropriate pain scale  - Administer analgesics based on type and severity of pain and evaluate response  - Implement non-pharmacological measures as appropriate and evaluate response  - Consider cultural and social influences on pain and pain management  - Notify physician/advanced practitioner if interventions unsuccessful or patient reports new pain  Outcome: Progressing     Problem: INFECTION - ADULT  Goal: Absence or prevention of progression during hospitalization  Description: INTERVENTIONS:  - Assess and monitor for signs and symptoms of infection  - Monitor lab/diagnostic results  - Monitor all insertion sites, i.e. indwelling lines, tubes, and drains  - Monitor endotracheal if appropriate and nasal secretions for changes in amount and color  - Zap appropriate cooling/warming therapies per order  - Administer medications as ordered  - Instruct and encourage patient and family to use good hand hygiene technique  - Identify and instruct in appropriate isolation precautions for identified infection/condition  Outcome: Progressing     Problem: SAFETY ADULT  Goal: Patient will remain free of falls  Description: INTERVENTIONS:  - Educate patient/family on patient safety including physical limitations  - Instruct patient to call for assistance with activity   - Consult OT/PT to assist with strengthening/mobility   - Keep Call bell within reach  - Keep bed low and locked with side rails adjusted as appropriate  - Keep care items and personal belongings within reach  - Initiate and maintain comfort rounds  - Make Fall Risk Sign visible to staff  - Offer Toileting every 2 Hours, in advance of need  - Initiate/Maintain bed alarm  - Obtain necessary fall risk management equipment  - Apply yellow socks and bracelet for high fall risk patients  - Consider moving patient to room near nurses station  Outcome: Progressing  Goal: Maintain or return to baseline ADL function  Description: INTERVENTIONS:  -  Assess patient's ability to carry out ADLs; assess patient's baseline for ADL function and identify physical deficits which impact ability to perform ADLs (bathing, care of mouth/teeth, toileting, grooming, dressing, etc.)  - Assess/evaluate cause of self-care deficits   - Assess range of motion  - Assess patient's mobility; develop plan if impaired  - Assess patient's need for assistive devices and provide as appropriate  - Encourage maximum independence but intervene and supervise when necessary  - Involve family in performance of ADLs  - Assess for home care needs following discharge   - Consider OT consult to assist with ADL evaluation and planning for discharge  - Provide patient education as appropriate  Outcome: Progressing  Goal: Maintains/Returns to pre admission functional level  Description: INTERVENTIONS:  - Perform BMAT or MOVE assessment daily.   - Set and communicate daily mobility goal to care team and patient/family/caregiver. - Collaborate with rehabilitation services on mobility goals if consulted  - Perform Range of Motion 4 times a day. - Reposition patient every 2 hours.   - Dangle patient 3 times a day  - Stand patient 3 times a day  - Ambulate patient 3 times a day  - Out of bed to chair 3 times a day   - Out of bed for meals 3 times a day  - Out of bed for toileting  - Record patient progress and toleration of activity level   Outcome: Progressing     Problem: DISCHARGE PLANNING  Goal: Discharge to home or other facility with appropriate resources  Description: INTERVENTIONS:  - Identify barriers to discharge w/patient and caregiver  - Arrange for needed discharge resources and transportation as appropriate  - Identify discharge learning needs (meds, wound care, etc.)  - Arrange for interpretive services to assist at discharge as needed  - Refer to Case Management Department for coordinating discharge planning if the patient needs post-hospital services based on physician/advanced practitioner order or complex needs related to functional status, cognitive ability, or social support system  Outcome: Progressing     Problem: Knowledge Deficit  Goal: Patient/family/caregiver demonstrates understanding of disease process, treatment plan, medications, and discharge instructions  Description: Complete learning assessment and assess knowledge base. Interventions:  - Provide teaching at level of understanding  - Provide teaching via preferred learning methods  Outcome: Progressing     Problem: Nutrition/Hydration-ADULT  Goal: Nutrient/Hydration intake appropriate for improving, restoring or maintaining nutritional needs  Description: Monitor and assess patient's nutrition/hydration status for malnutrition. Collaborate with interdisciplinary team and initiate plan and interventions as ordered. Monitor patient's weight and dietary intake as ordered or per policy. Utilize nutrition screening tool and intervene as necessary. Determine patient's food preferences and provide high-protein, high-caloric foods as appropriate.      INTERVENTIONS:  - Monitor oral intake, urinary output, labs, and treatment plans  - Assess nutrition and hydration status and recommend course of action  - Evaluate amount of meals eaten  - Assist patient with eating if necessary   - Allow adequate time for meals  - Recommend/ encourage appropriate diets, oral nutritional supplements, and vitamin/mineral supplements  - Order, calculate, and assess calorie counts as needed  - Recommend, monitor, and adjust tube feedings and TPN/PPN based on assessed needs  - Assess need for intravenous fluids  - Provide specific nutrition/hydration education as appropriate  - Include patient/family/caregiver in decisions related to nutrition  Outcome: Progressing     Problem: GASTROINTESTINAL - ADULT  Goal: Minimal or absence of nausea and/or vomiting  Description: INTERVENTIONS:  - Administer IV fluids if ordered to ensure adequate hydration  - Maintain NPO status until nausea and vomiting are resolved  - Nasogastric tube if ordered  - Administer ordered antiemetic medications as needed  - Provide nonpharmacologic comfort measures as appropriate  - Advance diet as tolerated, if ordered  - Consider nutrition services referral to assist patient with adequate nutrition and appropriate food choices  Outcome: Progressing  Goal: Maintains adequate nutritional intake  Description: INTERVENTIONS:  - Monitor percentage of each meal consumed  - Identify factors contributing to decreased intake, treat as appropriate  - Assist with meals as needed  - Monitor I&O, weight, and lab values if indicated  - Obtain nutrition services referral as needed  Outcome: Progressing

## 2023-08-01 NOTE — ED PROVIDER NOTES
History  Chief Complaint   Patient presents with   • Vomiting     Pt reports vomiting and poor PO intake for the past week. States it feels like the food is "getting stuck and just won't go down"     Patient is a 61-year-old male with a past medical history of asthma, liver cancer, coronary disease, hyperlipidemia, hypertension scented to the emergency department for evaluation of vomiting. Patient reports for the past week he has vomiting after eating. Patient reports he feels as though the food is getting stuck and will go down. Patient reports this has been happening with every meal for the past week. Patient reports he has not eaten anything today. Denies fevers, chills, rash, headache, weakness, dizziness, visual changes, nausea, diarrhea, constipation, chest pain, shortness of breath or difficulty breathing. Does not offer any other concerns or complaints. Prior to Admission Medications   Prescriptions Last Dose Informant Patient Reported? Taking? Probiotic Product (Bacid) TABS  Self Yes No   Sig: Take 1 tablet by mouth in the morning and 1 tablet in the evening. albuterol (2.5 mg/3 mL) 0.083 % nebulizer solution  Self Yes No   Sig: Take 2.5 mg by nebulization every 6 (six) hours as needed for wheezing or shortness of breath   atorvastatin (LIPITOR) 40 mg tablet  Self Yes No   Sig: Take 40 mg by mouth in the morning. dicyclomine (BENTYL) 10 mg capsule  Self Yes No   Sig: Take 10 mg by mouth in the morning and 10 mg at noon and 10 mg in the evening. dicyclomine (BENTYL) 20 mg tablet   No No   Sig: Take 1 tablet (20 mg total) by mouth 2 (two) times a day as needed (Abdominal pain)   docusate sodium (COLACE) 100 mg capsule   No No   Sig: Take 1 capsule (100 mg total) by mouth in the morning and 1 capsule (100 mg total) in the evening. Do all this for 7 days. lisinopril (ZESTRIL) 10 mg tablet  Self Yes No   Sig: Take 10 mg by mouth in the morning.    metoprolol tartrate (LOPRESSOR) 25 mg tablet  Self Yes No   Sig: Take 25 mg by mouth every 12 (twelve) hours   nitroglycerin (NITROSTAT) 0.3 mg SL tablet  Self Yes No   Sig: Place 0.3 mg under the tongue every 5 (five) minutes as needed for chest pain   omeprazole (PriLOSEC) 20 mg delayed release capsule  Self Yes No   Sig: Take 20 mg by mouth in the morning. ondansetron (ZOFRAN-ODT) 4 mg disintegrating tablet   No No   Sig: Take 1 tablet (4 mg total) by mouth every 8 (eight) hours as needed for nausea or vomiting   oxyCODONE-acetaminophen (PERCOCET) 5-325 mg per tablet  Self Yes No   Sig: TAKE 1 TAB AS NEEDED EVERY 6 HOURS DAY 1, 1 TAB EVERY 12 HOURS DAYS 2 1 TAB ONCE ON DAY 3.   raNITIdine HCl (ZANTAC 75 PO)  Self Yes No   Sig: Take by mouth   traMADol-acetaminophen (ULTRACET) 37.5-325 mg per tablet   No No   Sig: Take 1 tablet by mouth every 8 (eight) hours as needed for moderate pain      Facility-Administered Medications: None       Past Medical History:   Diagnosis Date   • Angina pectoris (HCC)    • Asthma    • Cancer ([address])     liver   • Colon polyp    • Coronary artery disease    • Heart attack ([address])    • Hyperlipidemia    • Hypertension    • Liver disease    • Shortness of breath        Past Surgical History:   Procedure Laterality Date   • BACK SURGERY     • CHOLECYSTECTOMY     • COLONOSCOPY     • CORONARY ANGIOPLASTY WITH STENT PLACEMENT     • HERNIA REPAIR     • HERNIA REPAIR Left 5/17/2022    Procedure: laparoscopic attempted converted to open LEFT inguinal hernia repair with mesh;  Surgeon: Sammie Yang MD;  Location: [facility];  Service: General   • INGUINAL HERNIA REPAIR Left 05/17/2022    laparoscopic attempted converted to open L inguinal hernia repair w/mesh, Dr. Mas Filomena   • KNEE ARTHROSCOPY     • TONSILLECTOMY         History reviewed. No pertinent family history. I have reviewed and agree with the history as documented.     E-Cigarette/Vaping   • E-Cigarette Use Never User      E-Cigarette/Vaping Substances   • Nicotine No    • THC No    • CBD No    • Flavoring No    • Other No    • Unknown No      Social History     Tobacco Use   • Smoking status: Never   • Smokeless tobacco: Never   Vaping Use   • Vaping Use: Never used   Substance Use Topics   • Alcohol use: Never   • Drug use: Never       Review of Systems   Constitutional: Negative for chills and fever. HENT: Negative for ear pain and sore throat. Eyes: Negative for pain and visual disturbance. Respiratory: Negative for cough and shortness of breath. Cardiovascular: Negative for chest pain and palpitations. Gastrointestinal: Positive for abdominal pain (epigastric) and vomiting. Negative for constipation, diarrhea and nausea. Genitourinary: Negative for dysuria and hematuria. Musculoskeletal: Negative for arthralgias and back pain. Skin: Negative for color change and rash. Neurological: Negative for seizures and syncope. All other systems reviewed and are negative. Physical Exam  Physical Exam  Vitals and nursing note reviewed. Constitutional:       General: He is not in acute distress. Appearance: Normal appearance. He is well-developed. He is not toxic-appearing or diaphoretic. HENT:      Head: Normocephalic and atraumatic. Right Ear: External ear normal.      Left Ear: External ear normal.      Nose: Nose normal.      Mouth/Throat:      Mouth: Mucous membranes are moist.   Eyes:      General: No scleral icterus. Right eye: No discharge. Left eye: No discharge. Conjunctiva/sclera: Conjunctivae normal.   Cardiovascular:      Rate and Rhythm: Normal rate and regular rhythm. Heart sounds: No murmur heard. Pulmonary:      Effort: Pulmonary effort is normal. No respiratory distress. Breath sounds: Normal breath sounds. Abdominal:      Palpations: Abdomen is soft. Tenderness: There is abdominal tenderness in the epigastric area.    Musculoskeletal:         General: No swelling, deformity or signs of injury. Normal range of motion. Cervical back: Normal range of motion and neck supple. No rigidity. Skin:     General: Skin is warm and dry. Capillary Refill: Capillary refill takes less than 2 seconds. Coloration: Skin is not jaundiced. Findings: No erythema or rash. Neurological:      General: No focal deficit present. Mental Status: He is alert and oriented to person, place, and time. Mental status is at baseline. Cranial Nerves: No cranial nerve deficit. Gait: Gait normal.   Psychiatric:         Mood and Affect: Mood normal.         Behavior: Behavior normal.         Thought Content:  Thought content normal.         Judgment: Judgment normal.         Vital Signs  ED Triage Vitals   Temperature Pulse Respirations Blood Pressure SpO2   08/01/23 1145 08/01/23 1145 08/01/23 1145 08/01/23 1145 08/01/23 1145   98 °F (36.7 °C) 96 18 135/76 96 %      Temp Source Heart Rate Source Patient Position - Orthostatic VS BP Location FiO2 (%)   08/01/23 1145 08/01/23 1145 08/01/23 1345 08/01/23 1145 --   Oral Monitor Lying Left arm       Pain Score       08/01/23 1305       2           Vitals:    08/01/23 1145 08/01/23 1345   BP: 135/76 131/58   Pulse: 96 81   Patient Position - Orthostatic VS:  Lying         Visual Acuity      ED Medications  Medications   sucralfate (CARAFATE) tablet 1 g (1 g Oral Given 8/1/23 1522)       Diagnostic Studies  Results Reviewed     Procedure Component Value Units Date/Time    HS Troponin 0hr (reflex protocol) [[id number]]  (Normal) Collected: 08/01/23 1332    Lab Status: Final result Specimen: Blood from Arm, Right Updated: 08/01/23 1420     hs TnI 0hr 6 ng/L     HS Troponin I 2hr [[id number]]     Lab Status: No result Specimen: Blood     Comprehensive metabolic panel [[id number]]  (Abnormal) Collected: 08/01/23 1332    Lab Status: Final result Specimen: Blood from Arm, Right Updated: 08/01/23 1401     Sodium 136 mmol/L      Potassium 3.8 mmol/L      Chloride 103 mmol/L      CO2 26 mmol/L      ANION GAP 7 mmol/L      BUN 14 mg/dL      Creatinine 0.86 mg/dL      Glucose 110 mg/dL      Calcium 9.7 mg/dL      AST 14 U/L      ALT 9 U/L      Alkaline Phosphatase 60 U/L      Total Protein 7.6 g/dL      Albumin 4.7 g/dL      Total Bilirubin 1.21 mg/dL      eGFR 92 ml/min/1.73sq m     Narrative:      National Kid[employer] Disease Foundation guidelines for Chronic Kidney Disease (CKD):   •  Stage 1 with normal or high GFR (GFR > 90 mL/min/1.73 square meters)  •  Stage 2 Mild CKD (GFR = 60-89 mL/min/1.73 square meters)  •  Stage 3A Moderate CKD (GFR = 45-59 mL/min/1.73 square meters)  •  Stage 3B Moderate CKD (GFR = 30-44 mL/min/1.73 square meters)  •  Stage 4 Severe CKD (GFR = 15-29 mL/min/1.73 square meters)  •  Stage 5 End Stage CKD (GFR <15 mL/min/1.73 square meters)  Note: GFR calculation is accurate only with a steady state creatinine    Lipase [[id number]]  (Abnormal) Collected: 08/01/23 1332    Lab Status: Final result Specimen: Blood from Arm, Right Updated: 08/01/23 1401     Lipase 7 u/L     CBC and differential [[id number]]  (Abnormal) Collected: 08/01/23 1332    Lab Status: Final result Specimen: Blood from Arm, Right Updated: 08/01/23 1342     WBC 14.04 Thousand/uL      RBC 6.52 Million/uL      Hemoglobin 12.2 g/dL      Hematocrit 38.9 %      MCV 60 fL      MCH 18.7 pg      MCHC 31.4 g/dL      RDW 19.0 %      MPV 8.9 fL      Platelets 389 Thousands/uL      nRBC 0 /100 WBCs      Neutrophils Relative 84 %      Immat GRANS % 0 %      Lymphocytes Relative 11 %      Monocytes Relative 4 %      Eosinophils Relative 1 %      Basophils Relative 0 %      Neutrophils Absolute 11.79 Thousands/µL      Immature Grans Absolute 0.05 Thousand/uL      Lymphocytes Absolute 1.49 Thousands/µL      Monocytes Absolute 0.57 Thousand/µL      Eosinophils Absolute 0.10 Thousand/µL      Basophils Absolute 0.04 Thousands/µL                  CT chest abdomen pelvis wo contrast   Final Result by Noa Lucas Bart Weaver DO (08/01 9518)      No acute pathology within the chest, abdomen or pelvis. Specifically, no evidence of bowel obstruction. Stable hepatic lesions compatible with hemangiomas on prior imaging. Colonic diverticulosis without diverticulitis. Limited study without IV or oral contrast.      Workstation performed: [id number]                    Procedures  ECG 12 Lead Documentation Only    Date/Time: 8/1/2023 1:02 PM    Performed by: Donna Cochran PA-C  Authorized by: Donna Cochran PA-C    Indications / Diagnosis:  Chest Pain  ECG reviewed by me, the ED Provider: yes    Patient location:  ED  Previous ECG:     Previous ECG:  Compared to current    Comparison ECG info:  10/20/22    Similarity:  No change  Interpretation:     Interpretation: normal    Rate:     ECG rate:  86    ECG rate assessment: normal    Rhythm:     Rhythm: sinus rhythm    Ectopy:     Ectopy: none    QRS:     QRS axis:  Normal    QRS intervals:  Normal  Conduction:     Conduction: normal    ST segments:     ST segments:  Normal  T waves:     T waves: normal               ED Course               SBIRT 20yo+    Flowsheet Row Most Recent Value   Initial Alcohol Screen: US AUDIT-C     1. How often do you have a drink containing alcohol? 0 Filed at: 08/01/2023 1146   2. How many drinks containing alcohol do you have on a typical day you are drinking? 0 Filed at: 08/01/2023 1146   3a. Male UNDER 65: How often do you have five or more drinks on one occasion? 0 Filed at: 08/01/2023 1146   3b. FEMALE Any Age, or MALE 65+: How often do you have 4 or more drinks on one occassion? 0 Filed at: 08/01/2023 1146   Audit-C Score 0 Filed at: 08/01/2023 1146   EVAN: How many times in the past year have you. .. Used an illegal drug or used a prescription medication for non-medical reasons?  Never Filed at: 08/01/2023 1146                    Medical Decision Making    This is a 71-year-old male present to the emergency department for evaluation of vomiting. Patient reports he has been vomiting for the past week after eating. Patient reports he is unable to swallow and feels as though the food gets stuck. Patient reports he has been unable to eat a meal without vomiting. Patient reports he has not eaten today. Patient reports he has had a sore throat since all the vomiting. Patient is in no acute distress, stable vital signs on initial examination. Differential diagnosis to include but is not limited to: Achalasia, esophageal stricture, gastroenteritis, obstruction    Initial ED Plan: Imaging, labs    ED results:  No acute pathology within the chest, abdomen or pelvis. Specifically, no evidence of bowel obstruction. Stable hepatic lesions compatible with hemangiomas on prior imaging. Colonic diverticulosis without diverticulitis.    -Discussed with GI on-call, Dr. Candy Cobos, recommending admission for EGD tomorrow, n.p.o. after midnight. Patient is able to eat what ever he can tolerate at this point.  0 hour troponin: 6  Heart score: 2    Final ED assessment: Patient is stable and well appearing. Discussed radiologic studies and laboratory results. Patient verbalized understanding and is agreeable with the plan for admission. Discussed with Dr. Severa Spurling, observation, bridging orders placed. Amount and/or Complexity of Data Reviewed  Labs: ordered. Radiology: ordered. Risk  Prescription drug management. Decision regarding hospitalization.           Disposition  Final diagnoses:   Difficulty swallowing   Vomiting     Time reflects when diagnosis was documented in both MDM as applicable and the Disposition within this note     Time User Action Codes Description Comment    8/1/2023  3:19 PM Krystian Amaya Add [R13.10] Difficulty swallowing     8/1/2023  3:19 PM Krystian Amaya Add [R11.10] Vomiting       ED Disposition     ED Disposition   Admit    Condition   Stable    Date/Time   Tue Aug 1, 2023  3:19 PM Comment   Case was discussed with Dr. Yuliana Lopez and the patient's admission status was agreed to be Admission Status: observation status to the service of Dr. Yuliana Lopez . Follow-up Information    None         Patient's Medications   Discharge Prescriptions    No medications on file       No discharge procedures on file.     PDMP Review       Value Time User    PDMP Reviewed  Yes 5/17/2022  3:11 PM Camille Morel PA-C          ED Provider  Electronically Signed by           Eddi Choudhary PA-C  08/01/23 [facility]  08/01/23 [id number]

## 2023-08-02 ENCOUNTER — APPOINTMENT (OUTPATIENT)
Dept: GASTROENTEROLOGY | Facility: HOSPITAL | Age: 63
End: 2023-08-02
Attending: INTERNAL MEDICINE
Payer: COMMERCIAL

## 2023-08-02 ENCOUNTER — ANESTHESIA EVENT (OUTPATIENT)
Dept: GASTROENTEROLOGY | Facility: HOSPITAL | Age: 63
End: 2023-08-02
Payer: COMMERCIAL

## 2023-08-02 ENCOUNTER — ANESTHESIA (OUTPATIENT)
Dept: GASTROENTEROLOGY | Facility: HOSPITAL | Age: 63
End: 2023-08-02
Payer: COMMERCIAL

## 2023-08-02 VITALS
BODY MASS INDEX: 28.96 KG/M2 | OXYGEN SATURATION: 97 % | TEMPERATURE: 98 F | RESPIRATION RATE: 20 BRPM | SYSTOLIC BLOOD PRESSURE: 128 MMHG | HEIGHT: 67 IN | WEIGHT: 184.53 LBS | DIASTOLIC BLOOD PRESSURE: 86 MMHG | HEART RATE: 68 BPM

## 2023-08-02 LAB
ALBUMIN SERPL BCP-MCNC: 4.1 G/DL (ref 3.5–5)
ALP SERPL-CCNC: 55 U/L (ref 34–104)
ALT SERPL W P-5'-P-CCNC: 8 U/L (ref 7–52)
ANION GAP SERPL CALCULATED.3IONS-SCNC: 4 MMOL/L
AST SERPL W P-5'-P-CCNC: 11 U/L (ref 13–39)
ATRIAL RATE: 86 BPM
BASOPHILS # BLD AUTO: 0.04 THOUSANDS/ÂΜL (ref 0–0.1)
BASOPHILS NFR BLD AUTO: 1 % (ref 0–1)
BILIRUB SERPL-MCNC: 0.88 MG/DL (ref 0.2–1)
BUN SERPL-MCNC: 15 MG/DL (ref 5–25)
CALCIUM SERPL-MCNC: 9.4 MG/DL (ref 8.4–10.2)
CHLORIDE SERPL-SCNC: 104 MMOL/L (ref 96–108)
CO2 SERPL-SCNC: 29 MMOL/L (ref 21–32)
CREAT SERPL-MCNC: 0.92 MG/DL (ref 0.6–1.3)
EOSINOPHIL # BLD AUTO: 0.28 THOUSAND/ÂΜL (ref 0–0.61)
EOSINOPHIL NFR BLD AUTO: 4 % (ref 0–6)
ERYTHROCYTE [DISTWIDTH] IN BLOOD BY AUTOMATED COUNT: 18.4 % (ref 11.6–15.1)
GFR SERPL CREATININE-BSD FRML MDRD: 88 ML/MIN/1.73SQ M
GLUCOSE P FAST SERPL-MCNC: 122 MG/DL (ref 65–99)
GLUCOSE SERPL-MCNC: 122 MG/DL (ref 65–140)
HCT VFR BLD AUTO: 35 % (ref 36.5–49.3)
HGB BLD-MCNC: 10.9 G/DL (ref 12–17)
IMM GRANULOCYTES # BLD AUTO: 0.03 THOUSAND/UL (ref 0–0.2)
IMM GRANULOCYTES NFR BLD AUTO: 0 % (ref 0–2)
LYMPHOCYTES # BLD AUTO: 2 THOUSANDS/ÂΜL (ref 0.6–4.47)
LYMPHOCYTES NFR BLD AUTO: 29 % (ref 14–44)
MCH RBC QN AUTO: 18.7 PG (ref 26.8–34.3)
MCHC RBC AUTO-ENTMCNC: 31.1 G/DL (ref 31.4–37.4)
MCV RBC AUTO: 60 FL (ref 82–98)
MONOCYTES # BLD AUTO: 0.49 THOUSAND/ÂΜL (ref 0.17–1.22)
MONOCYTES NFR BLD AUTO: 7 % (ref 4–12)
NEUTROPHILS # BLD AUTO: 4.17 THOUSANDS/ÂΜL (ref 1.85–7.62)
NEUTS SEG NFR BLD AUTO: 59 % (ref 43–75)
NRBC BLD AUTO-RTO: 0 /100 WBCS
P AXIS: 38 DEGREES
PLATELET # BLD AUTO: 295 THOUSANDS/UL (ref 149–390)
PMV BLD AUTO: 9.3 FL (ref 8.9–12.7)
POTASSIUM SERPL-SCNC: 3.4 MMOL/L (ref 3.5–5.3)
PR INTERVAL: 192 MS
PROT SERPL-MCNC: 6.7 G/DL (ref 6.4–8.4)
QRS AXIS: 45 DEGREES
QRSD INTERVAL: 100 MS
QT INTERVAL: 364 MS
QTC INTERVAL: 435 MS
RBC # BLD AUTO: 5.84 MILLION/UL (ref 3.88–5.62)
SODIUM SERPL-SCNC: 137 MMOL/L (ref 135–147)
T WAVE AXIS: 18 DEGREES
VENTRICULAR RATE: 86 BPM
WBC # BLD AUTO: 7.01 THOUSAND/UL (ref 4.31–10.16)

## 2023-08-02 PROCEDURE — 88305 TISSUE EXAM BY PATHOLOGIST: CPT | Performed by: PATHOLOGY

## 2023-08-02 PROCEDURE — 80053 COMPREHEN METABOLIC PANEL: CPT | Performed by: INTERNAL MEDICINE

## 2023-08-02 PROCEDURE — 94760 N-INVAS EAR/PLS OXIMETRY 1: CPT

## 2023-08-02 PROCEDURE — 94640 AIRWAY INHALATION TREATMENT: CPT

## 2023-08-02 PROCEDURE — NC001 PR NO CHARGE: Performed by: INTERNAL MEDICINE

## 2023-08-02 PROCEDURE — 93010 ELECTROCARDIOGRAM REPORT: CPT | Performed by: INTERNAL MEDICINE

## 2023-08-02 PROCEDURE — 85025 COMPLETE CBC W/AUTO DIFF WBC: CPT | Performed by: INTERNAL MEDICINE

## 2023-08-02 PROCEDURE — 99239 HOSP IP/OBS DSCHRG MGMT >30: CPT | Performed by: INTERNAL MEDICINE

## 2023-08-02 PROCEDURE — 94664 DEMO&/EVAL PT USE INHALER: CPT

## 2023-08-02 RX ORDER — LIDOCAINE HYDROCHLORIDE 20 MG/ML
INJECTION, SOLUTION EPIDURAL; INFILTRATION; INTRACAUDAL; PERINEURAL AS NEEDED
Status: DISCONTINUED | OUTPATIENT
Start: 2023-08-02 | End: 2023-08-02

## 2023-08-02 RX ORDER — OXYCODONE HYDROCHLORIDE AND ACETAMINOPHEN 5; 325 MG/1; MG/1
1 TABLET ORAL 2 TIMES DAILY PRN
Qty: 10 TABLET | Refills: 0 | Status: SHIPPED | OUTPATIENT
Start: 2023-08-02

## 2023-08-02 RX ORDER — TAMSULOSIN HYDROCHLORIDE 0.4 MG/1
CAPSULE ORAL
COMMUNITY
Start: 2023-06-06

## 2023-08-02 RX ORDER — PROPOFOL 10 MG/ML
INJECTION, EMULSION INTRAVENOUS AS NEEDED
Status: DISCONTINUED | OUTPATIENT
Start: 2023-08-02 | End: 2023-08-02

## 2023-08-02 RX ORDER — SUCRALFATE 1 G/1
TABLET ORAL
COMMUNITY
Start: 2022-08-09 | End: 2023-08-09

## 2023-08-02 RX ORDER — LISINOPRIL 10 MG/1
10 TABLET ORAL DAILY
COMMUNITY

## 2023-08-02 RX ORDER — FLUTICASONE PROPIONATE AND SALMETEROL 250; 50 UG/1; UG/1
POWDER RESPIRATORY (INHALATION)
COMMUNITY
Start: 2023-06-24

## 2023-08-02 RX ORDER — SODIUM CHLORIDE, SODIUM LACTATE, POTASSIUM CHLORIDE, CALCIUM CHLORIDE 600; 310; 30; 20 MG/100ML; MG/100ML; MG/100ML; MG/100ML
INJECTION, SOLUTION INTRAVENOUS CONTINUOUS PRN
Status: DISCONTINUED | OUTPATIENT
Start: 2023-08-02 | End: 2023-08-02

## 2023-08-02 RX ORDER — PANTOPRAZOLE SODIUM 40 MG/1
40 TABLET, DELAYED RELEASE ORAL 2 TIMES DAILY
Qty: 60 TABLET | Refills: 0 | Status: SHIPPED | OUTPATIENT
Start: 2023-08-02 | End: 2023-09-01

## 2023-08-02 RX ADMIN — ATORVASTATIN CALCIUM 40 MG: 40 TABLET, FILM COATED ORAL at 08:21

## 2023-08-02 RX ADMIN — HEPARIN SODIUM 5000 UNITS: 5000 INJECTION INTRAVENOUS; SUBCUTANEOUS at 05:39

## 2023-08-02 RX ADMIN — PROPOFOL 80 MG: 10 INJECTION, EMULSION INTRAVENOUS at 13:09

## 2023-08-02 RX ADMIN — PROPOFOL 40 MG: 10 INJECTION, EMULSION INTRAVENOUS at 13:19

## 2023-08-02 RX ADMIN — PROPOFOL 40 MG: 10 INJECTION, EMULSION INTRAVENOUS at 13:12

## 2023-08-02 RX ADMIN — HEPARIN SODIUM 5000 UNITS: 5000 INJECTION INTRAVENOUS; SUBCUTANEOUS at 14:58

## 2023-08-02 RX ADMIN — PROPOFOL 40 MG: 10 INJECTION, EMULSION INTRAVENOUS at 13:15

## 2023-08-02 RX ADMIN — LEVALBUTEROL HYDROCHLORIDE 1.25 MG: 1.25 SOLUTION RESPIRATORY (INHALATION) at 07:25

## 2023-08-02 RX ADMIN — SODIUM CHLORIDE, SODIUM LACTATE, POTASSIUM CHLORIDE, AND CALCIUM CHLORIDE: .6; .31; .03; .02 INJECTION, SOLUTION INTRAVENOUS at 13:09

## 2023-08-02 RX ADMIN — METOPROLOL TARTRATE 25 MG: 25 TABLET, FILM COATED ORAL at 08:21

## 2023-08-02 RX ADMIN — PROPOFOL 40 MG: 10 INJECTION, EMULSION INTRAVENOUS at 13:10

## 2023-08-02 RX ADMIN — LIDOCAINE HYDROCHLORIDE 100 MG: 20 INJECTION, SOLUTION EPIDURAL; INFILTRATION; INTRACAUDAL; PERINEURAL at 13:09

## 2023-08-02 NOTE — PLAN OF CARE
Problem: PAIN - ADULT  Goal: Verbalizes/displays adequate comfort level or baseline comfort level  Description: Interventions:  - Encourage patient to monitor pain and request assistance  - Assess pain using appropriate pain scale  - Administer analgesics based on type and severity of pain and evaluate response  - Implement non-pharmacological measures as appropriate and evaluate response  - Consider cultural and social influences on pain and pain management  - Notify physician/advanced practitioner if interventions unsuccessful or patient reports new pain  Outcome: Progressing     Problem: INFECTION - ADULT  Goal: Absence or prevention of progression during hospitalization  Description: INTERVENTIONS:  - Assess and monitor for signs and symptoms of infection  - Monitor lab/diagnostic results  - Monitor all insertion sites, i.e. indwelling lines, tubes, and drains  - Monitor endotracheal if appropriate and nasal secretions for changes in amount and color  - [place] appropriate cooling/warming therapies per order  - Administer medications as ordered  - Instruct and encourage patient and family to use good hand hygiene technique  - Identify and instruct in appropriate isolation precautions for identified infection/condition  Outcome: Progressing     Problem: SAFETY ADULT  Goal: Patient will remain free of falls  Description: INTERVENTIONS:  - Educate patient/family on patient safety including physical limitations  - Instruct patient to call for assistance with activity   - Consult OT/PT to assist with strengthening/mobility   - Keep Call bell within reach  - Keep bed low and locked with side rails adjusted as appropriate  - Keep care items and personal belongings within reach  - Initiate and maintain comfort rounds  - Make Fall Risk Sign visible to staff  - Offer Toileting every 2 Hours, in advance of need  - Initiate/Maintain bed alarm  - Apply yellow socks and bracelet for high fall risk patients  - Consider moving patient to room near nurses station  Outcome: Progressing  Goal: Maintain or return to baseline ADL function  Description: INTERVENTIONS:  -  Assess patient's ability to carry out ADLs; assess patient's baseline for ADL function and identify physical deficits which impact ability to perform ADLs (bathing, care of mouth/teeth, toileting, grooming, dressing, etc.)  - Assess/evaluate cause of self-care deficits   - Assess range of motion  - Assess patient's mobility; develop plan if impaired  - Assess patient's need for assistive devices and provide as appropriate  - Encourage maximum independence but intervene and supervise when necessary  - Involve family in performance of ADLs  - Assess for home care needs following discharge   - Consider OT consult to assist with ADL evaluation and planning for discharge  - Provide patient education as appropriate  Outcome: Progressing  Goal: Maintains/Returns to pre admission functional level  Description: INTERVENTIONS:  - Perform BMAT or MOVE assessment daily.   - Set and communicate daily mobility goal to care team and patient/family/caregiver. - Collaborate with rehabilitation services on mobility goals if consulted  - Perform Range of Motion 3 times a day. - Reposition patient every 2 hours.   - Dangle patient 3 times a day  - Stand patient 3 times a day  - Ambulate patient 3 times a day  - Out of bed to chair 3 times a day   - Out of bed for meals 3 times a day  - Out of bed for toileting  - Record patient progress and toleration of activity level   Outcome: Progressing     Problem: DISCHARGE PLANNING  Goal: Discharge to home or other facility with appropriate resources  Description: INTERVENTIONS:  - Identify barriers to discharge w/patient and caregiver  - Arrange for needed discharge resources and transportation as appropriate  - Identify discharge learning needs (meds, wound care, etc.)  - Arrange for interpretive services to assist at discharge as needed  - Refer to Case Management Department for coordinating discharge planning if the patient needs post-hospital services based on physician/advanced practitioner order or complex needs related to functional status, cognitive ability, or social support system  Outcome: Progressing     Problem: Knowledge Deficit  Goal: Patient/family/caregiver demonstrates understanding of disease process, treatment plan, medications, and discharge instructions  Description: Complete learning assessment and assess knowledge base. Interventions:  - Provide teaching at level of understanding  - Provide teaching via preferred learning methods  Outcome: Progressing     Problem: Nutrition/Hydration-ADULT  Goal: Nutrient/Hydration intake appropriate for improving, restoring or maintaining nutritional needs  Description: Monitor and assess patient's nutrition/hydration status for malnutrition. Collaborate with interdisciplinary team and initiate plan and interventions as ordered. Monitor patient's weight and dietary intake as ordered or per policy. Utilize nutrition screening tool and intervene as necessary. Determine patient's food preferences and provide high-protein, high-caloric foods as appropriate.      INTERVENTIONS:  - Monitor oral intake, urinary output, labs, and treatment plans  - Assess nutrition and hydration status and recommend course of action  - Evaluate amount of meals eaten  - Assist patient with eating if necessary   - Allow adequate time for meals  - Recommend/ encourage appropriate diets, oral nutritional supplements, and vitamin/mineral supplements  - Order, calculate, and assess calorie counts as needed  - Recommend, monitor, and adjust tube feedings and TPN/PPN based on assessed needs  - Assess need for intravenous fluids  - Provide specific nutrition/hydration education as appropriate  - Include patient/family/caregiver in decisions related to nutrition  Outcome: Progressing     Problem: GASTROINTESTINAL - ADULT  Goal: Minimal or absence of nausea and/or vomiting  Description: INTERVENTIONS:  - Administer IV fluids if ordered to ensure adequate hydration  - Maintain NPO status until nausea and vomiting are resolved  - Nasogastric tube if ordered  - Administer ordered antiemetic medications as needed  - Provide nonpharmacologic comfort measures as appropriate  - Advance diet as tolerated, if ordered  - Consider nutrition services referral to assist patient with adequate nutrition and appropriate food choices  Outcome: Progressing  Goal: Maintains adequate nutritional intake  Description: INTERVENTIONS:  - Monitor percentage of each meal consumed  - Identify factors contributing to decreased intake, treat as appropriate  - Assist with meals as needed  - Monitor I&O, weight, and lab values if indicated  - Obtain nutrition services referral as needed  Outcome: Progressing

## 2023-08-02 NOTE — ANESTHESIA PREPROCEDURE EVALUATION
Procedure:  EGD    59y.o. year old male who presents with a past medical history significant for hypertension, COPD, and coronary artery disease.     Patient presents to [facility] emergency department yesterday with a chief complaint of dysphagia. Patient reports that for quite some time now he has been having solid food sticking in his esophagus. Patient reports episodes of regurgitation as well as vomiting of undigested food. Patient reports that he can tolerate liquids with no problem. Patient reports about a 20 pound weight loss over the past several months. Patient denies any abdominal pain. Patient denies any significant episodes of heartburn. Patient denies any diarrhea or constipation. Patient denies any melena or rectal bleeding.     Patient's last EGD was in February 2022. Patient did have mucosal inflammation at the GE junction from her previous food bolus but no actual food was identified. Patient was dilated with a 47 Yi savory dilator. Relevant Problems   CARDIO   (+) Coronary artery disease   (+) Primary hypertension      GI/HEPATIC   (+) Dysphagia   (+) Liver cancer (HCC)      HEMATOLOGY   (+) Iron deficiency anemia      PULMONARY   (+) COPD (chronic obstructive pulmonary disease) (HCC)        Physical Exam    Airway    Mallampati score: III  TM Distance: >3 FB  Neck ROM: full     Dental       Cardiovascular  Murmur, Cardiovascular exam normal    Pulmonary  Pulmonary exam normal     Other Findings       Abnormal CT scan   COPD (chronic obstructive pulmonary disease) (HCC)   Coronary artery disease   Diverticulitis of large intestine without perforation or abscess without bleeding   Dysphagia   Iron deficiency anemia   Liver cancer (HCC)   Primary hypertension       Anesthesia Plan  ASA Score- 3     Anesthesia Type- IV sedation with anesthesia with ASA Monitors. Additional Monitors:   Airway Plan:           Plan Factors-Exercise tolerance (METS): <4 METS.     Chart reviewed. EKG reviewed. Imaging results reviewed. Existing labs reviewed. Patient summary reviewed. Patient is not a current smoker. Induction- intravenous. Postoperative Plan-     Informed Consent- Anesthetic plan and risks discussed with patient. I personally reviewed this patient with the CRNA. Discussed and agreed on the Anesthesia Plan with the CRNA. Mavis Sheridan ECHO Narrative 2021    This result has an attachment that is not available. •  Left Ventricle: Systolic function is mildly decreased with an ejection   fraction of 45-50% (visual estimation). •  Left Ventricle: Mild global hypokinesis. •  Right Ventricle: Right ventricle cavity is mildly dilated. Systolic   function is normal.   •  Ascending Aorta: Aortic root and proximal ascending aorta dilated at   4.9 cm.  Recommend dedicated aortic imaging with CT scan or MRI to   accurately assess aortic dimensions. •  Left Ventricle: There is grade II (moderate) diastolic dysfunction and   normal left atrial pressure. •  Aortic Valve: There is mild sclerosis.  Cannot accurately assess the   number of aortic cusps. There is mild regurgitation. •  Pulmonic Valve: The estimated pulmonary artery systolic pressure is   74.3 mmHg. Normal pulmonary artery pressure.

## 2023-08-02 NOTE — RESPIRATORY THERAPY NOTE
RT Protocol Note  Randolph Rojas 59 y.o. male MRN: [id number]  Unit/Bed#:  Encounter: [id number]    Assessment    Principal Problem:    Dysphagia  Active Problems:    COPD (chronic obstructive pulmonary disease) (HCC)    Primary hypertension    Coronary artery disease  Subjective    Subjective Data: awake and alert    Objective    Physical Exam:   Assessment Type: (P) Post-treatment  General Appearance: (P) Drowsy  Respiratory Pattern: (P) Normal  Chest Assessment: (P) Chest expansion symmetrical  Bilateral Breath Sounds: (P) Clear, Diminished  O2 Device: (P) nc    Vitals:  Blood pressure 134/64, pulse 74, temperature 97.7 °F (36.5 °C), temperature source Oral, resp. rate (P) 16, height 5' 7" (1.702 m), weight 83.7 kg (184 lb 8.4 oz), SpO2 (P) 95 %. O2 Device: (P) nc     Plan    Respiratory Plan: (P) Mild Distress pathway        Resp Comments: (P) will cont txs as scheduled at this time        08/02/23 0738   Respiratory Protocol   Protocol Initiated? No   Protocol Selection Respiratory   Language Barrier? No   Medical & Social History Reviewed? Yes   Diagnostic Studies Reviewed? Yes   Physical Assessment Performed?  Yes   Respiratory Plan Mild Distress pathway   Respiratory Assessment   Assessment Type Post-treatment   General Appearance Drowsy   Respiratory Pattern Normal   Chest Assessment Chest expansion symmetrical   Bilateral Breath Sounds Clear;Diminished   Resp Comments will cont txs as scheduled at this time   O2 Device nc   Additional Assessments   Pulse 77   Respirations 16   SpO2 95 %

## 2023-08-02 NOTE — ASSESSMENT & PLAN NOTE
Patient with reported episodes of sensation that the food is "being stuck" in his lower chest/esophagus and episodes of regurgitation of undigested food. Denies major issues with liquids. Also has developed some hoarseness recently.   Clinically very suspicious for achalasia  · NPO for now, EGD today  · GI consult, appreciate recommendations

## 2023-08-02 NOTE — PROGRESS NOTES
[address]  Progress Note  Name: Shiloh Mathews  MRN: [id number]  Unit/Bed#:  I Date of Admission: 8/1/2023   Date of Service: 8/2/2023 I Hospital Day: 0    Assessment/Plan   * Dysphagia  Assessment & Plan  Patient with reported episodes of sensation that the food is "being stuck" in his lower chest/esophagus and episodes of regurgitation of undigested food. Denies major issues with liquids. Also has developed some hoarseness recently. Clinically very suspicious for achalasia  · NPO for now, EGD today  · GI consult, appreciate recommendations    Coronary artery disease  Assessment & Plan  · Reported in chart  · Denies any chest pain  · Continue metoprolol    Primary hypertension  Assessment & Plan  · Continue metoprolol  · Monitor blood pressure    COPD (chronic obstructive pulmonary disease) ([facility])  Assessment & Plan  · Appears overall stable  · Continue PRN albuterol            VTE Pharmacologic Prophylaxis: VTE Score: 2 Moderate Risk (Score 3-4) - Pharmacological DVT Prophylaxis Ordered: heparin. Patient Centered Rounds: I performed bedside rounds with nursing staff today. Discussions with Specialists or Other Care Team Provider: case management, GI    Education and Discussions with Family / Patient: discussed with the patient. Total Time Spent on Date of Encounter in care of patient: 65 minutes This time was spent on one or more of the following: performing physical exam; counseling and coordination of care; obtaining or reviewing history; documenting in the medical record; reviewing/ordering tests, medications or procedures; communicating with other healthcare professionals and discussing with patient's family/caregivers.     Current Length of Stay: 0 day(s)  Current Patient Status: Observation   Certification Statement: The patient will continue to require additional inpatient hospital stay due to endoscopy, dysphagia  Discharge Plan: Anticipate discharge later today or tomorrow to home. Code Status: Level 1 - Full Code    Subjective:   Reports ongoing issues with food sticking in throat sensation for a few months, however noted regurgitation mostly over the past few weeks. Also reports weight loss. No abdominal pain. Objective:     Vitals:   Temp (24hrs), Av °F (36.7 °C), Min:97.7 °F (36.5 °C), Max:98.2 °F (36.8 °C)    Temp:  [97.7 °F (36.5 °C)-98.2 °F (36.8 °C)] 97.7 °F (36.5 °C)  HR:  [74-96] 74  Resp:  [14-18] 16  BP: (108-135)/(58-81) 134/64  SpO2:  [93 %-96 %] 95 %  Body mass index is 28.9 kg/m². Input and Output Summary (last 24 hours): Intake/Output Summary (Last 24 hours) at 2023 1048  Last data filed at 2023 0800  Gross per 24 hour   Intake 180 ml   Output 0 ml   Net 180 ml       Physical Exam:   Physical Exam  Vitals and nursing note reviewed. Constitutional:       General: He is not in acute distress. Appearance: He is well-developed. He is not toxic-appearing. HENT:      Head: Normocephalic and atraumatic. Nose: Nose normal.      Mouth/Throat:      Mouth: Mucous membranes are moist.      Pharynx: Oropharynx is clear. Eyes:      Pupils: Pupils are equal, round, and reactive to light. Cardiovascular:      Rate and Rhythm: Normal rate. Pulses: Normal pulses. Pulmonary:      Effort: Pulmonary effort is normal. No respiratory distress. Breath sounds: Normal breath sounds. No wheezing. Abdominal:      Palpations: Abdomen is soft. Tenderness: There is no abdominal tenderness. There is no guarding. Musculoskeletal:         General: No swelling or tenderness. Normal range of motion. Cervical back: Normal range of motion. Skin:     General: Skin is warm and dry. Capillary Refill: Capillary refill takes less than 2 seconds. Neurological:      General: No focal deficit present. Mental Status: He is alert and oriented to person, place, and time. Mental status is at baseline.    Psychiatric: Mood and Affect: Mood normal.         Thought Content: Thought content normal.         Additional Data:     Labs:  Results from last 7 days   Lab Units 08/02/23  0538   WBC Thousand/uL 7.01   HEMOGLOBIN g/dL 10.9*   HEMATOCRIT % 35.0*   PLATELETS Thousands/uL 295   NEUTROS PCT % 59   LYMPHS PCT % 29   MONOS PCT % 7   EOS PCT % 4     Results from last 7 days   Lab Units 08/02/23  0538   SODIUM mmol/L 137   POTASSIUM mmol/L 3.4*   CHLORIDE mmol/L 104   CO2 mmol/L 29   BUN mg/dL 15   CREATININE mg/dL 0.92   ANION GAP mmol/L 4   CALCIUM mg/dL 9.4   ALBUMIN g/dL 4.1   TOTAL BILIRUBIN mg/dL 0.88   ALK PHOS U/L 55   ALT U/L 8   AST U/L 11*   GLUCOSE RANDOM mg/dL 122                       Lines/Drains:  Invasive Devices     Peripheral Intravenous Line  Duration           Peripheral IV 08/01/23 Right Antecubital <1 day                      Imaging: Reviewed radiology reports from this admission including: chest CT scan and abdominal/pelvic CT    Recent Cultures (last 7 days):         Last 24 Hours Medication List:   Current Facility-Administered Medications   Medication Dose Route Frequency Provider Last Rate   • albuterol  2 puff Inhalation Q4H PRN Mike Avalos MD     • atorvastatin  40 mg Oral Daily Mike Avalos MD     • heparin (porcine)  5,000 Units Subcutaneous [facility] Mike Avalos MD     • levalbuterol  1.25 mg Nebulization TID Mike Avalos MD     • metoprolol tartrate  25 mg Oral Q12H 1600 Alex Vora MD     • oxyCODONE-acetaminophen  1 tablet Oral BID PRN Mike Avalos MD          Today, Patient Was Seen By: Macey Ontiveros MD    **Please Note: This note may have been constructed using a voice recognition system. **

## 2023-08-02 NOTE — ASSESSMENT & PLAN NOTE
Patient with reported episodes of sensation that the food is "being stuck" in his lower chest/esophagus and episodes of regurgitation of undigested food. Denies major issues with liquids. Also has developed some hoarseness recently. Clinically very suspicious for achalasia  · S/p EGD, Mild [place] class B esophagitis. This is not the cause for his dysphagia. Otherwise normal upper endoscopy  · GI consult, appreciate recommendations. Optimize Protonix 40 mg twice daily  · If symptoms persist, would need follow-up with GI and outpatient barium swallow study. Also might likely benefit from outpatient pH/manometry  · Stable for discharge today.

## 2023-08-02 NOTE — PLAN OF CARE
Problem: PAIN - ADULT  Goal: Verbalizes/displays adequate comfort level or baseline comfort level  Description: Interventions:  - Encourage patient to monitor pain and request assistance  - Assess pain using appropriate pain scale  - Administer analgesics based on type and severity of pain and evaluate response  - Implement non-pharmacological measures as appropriate and evaluate response  - Consider cultural and social influences on pain and pain management  - Notify physician/advanced practitioner if interventions unsuccessful or patient reports new pain  Outcome: Progressing     Problem: INFECTION - ADULT  Goal: Absence or prevention of progression during hospitalization  Description: INTERVENTIONS:  - Assess and monitor for signs and symptoms of infection  - Monitor lab/diagnostic results  - Monitor all insertion sites, i.e. indwelling lines, tubes, and drains  - Monitor endotracheal if appropriate and nasal secretions for changes in amount and color  - [place] appropriate cooling/warming therapies per order  - Administer medications as ordered  - Instruct and encourage patient and family to use good hand hygiene technique  - Identify and instruct in appropriate isolation precautions for identified infection/condition  Outcome: Progressing     Problem: SAFETY ADULT  Goal: Patient will remain free of falls  Description: INTERVENTIONS:  - Educate patient/family on patient safety including physical limitations  - Instruct patient to call for assistance with activity   - Consult OT/PT to assist with strengthening/mobility   - Keep Call bell within reach  - Keep bed low and locked with side rails adjusted as appropriate  - Keep care items and personal belongings within reach  - Initiate and maintain comfort rounds  - Make Fall Risk Sign visible to staff  - Offer Toileting every 2 Hours, in advance of need  - Initiate/Maintain bed alarm  - Obtain necessary fall risk management equipment: yes   - Apply yellow socks and bracelet for high fall risk patients  - Consider moving patient to room near nurses station  Outcome: Progressing     Problem: DISCHARGE PLANNING  Goal: Discharge to home or other facility with appropriate resources  Description: INTERVENTIONS:  - Identify barriers to discharge w/patient and caregiver  - Arrange for needed discharge resources and transportation as appropriate  - Identify discharge learning needs (meds, wound care, etc.)  - Arrange for interpretive services to assist at discharge as needed  - Refer to Case Management Department for coordinating discharge planning if the patient needs post-hospital services based on physician/advanced practitioner order or complex needs related to functional status, cognitive ability, or social support system  Outcome: Progressing     Problem: Knowledge Deficit  Goal: Patient/family/caregiver demonstrates understanding of disease process, treatment plan, medications, and discharge instructions  Description: Complete learning assessment and assess knowledge base. Interventions:  - Provide teaching at level of understanding  - Provide teaching via preferred learning methods  Outcome: Progressing     Problem: Nutrition/Hydration-ADULT  Goal: Nutrient/Hydration intake appropriate for improving, restoring or maintaining nutritional needs  Description: Monitor and assess patient's nutrition/hydration status for malnutrition. Collaborate with interdisciplinary team and initiate plan and interventions as ordered. Monitor patient's weight and dietary intake as ordered or per policy. Utilize nutrition screening tool and intervene as necessary. Determine patient's food preferences and provide high-protein, high-caloric foods as appropriate.      INTERVENTIONS:  - Monitor oral intake, urinary output, labs, and treatment plans  - Assess nutrition and hydration status and recommend course of action  - Evaluate amount of meals eaten  - Assist patient with eating if necessary - Allow adequate time for meals  - Recommend/ encourage appropriate diets, oral nutritional supplements, and vitamin/mineral supplements  - Order, calculate, and assess calorie counts as needed  - Recommend, monitor, and adjust tube feedings and TPN/PPN based on assessed needs  - Assess need for intravenous fluids  - Provide specific nutrition/hydration education as appropriate  - Include patient/family/caregiver in decisions related to nutrition  Outcome: Progressing     Problem: GASTROINTESTINAL - ADULT  Goal: Minimal or absence of nausea and/or vomiting  Description: INTERVENTIONS:  - Administer IV fluids if ordered to ensure adequate hydration  - Maintain NPO status until nausea and vomiting are resolved  - Nasogastric tube if ordered  - Administer ordered antiemetic medications as needed  - Provide nonpharmacologic comfort measures as appropriate  - Advance diet as tolerated, if ordered  - Consider nutrition services referral to assist patient with adequate nutrition and appropriate food choices  Outcome: Progressing  Goal: Maintains adequate nutritional intake  Description: INTERVENTIONS:  - Monitor percentage of each meal consumed  - Identify factors contributing to decreased intake, treat as appropriate  - Assist with meals as needed  - Monitor I&O, weight, and lab values if indicated  - Obtain nutrition services referral as needed  Outcome: Progressing

## 2023-08-02 NOTE — RESPIRATORY THERAPY NOTE
RT Protocol Note  De Sydni 59 y.o. male MRN: [id number]  Unit/Bed#:  Encounter: [id number]    Assessment    Principal Problem:    Dysphagia  Active Problems:    COPD (chronic obstructive pulmonary disease) (HCC)    Primary hypertension    Coronary artery disease      Home Pulmonary Medications:  Inhalers/nebulizers       Past Medical History:   Diagnosis Date   • Angina pectoris (HCC)    • Asthma    • Cancer ([address])     liver   • Colon polyp    • Coronary artery disease    • Heart attack ([address])    • Hyperlipidemia    • Hypertension    • Liver disease    • Shortness of breath      Social History     Socioeconomic History   • Marital status: Single     Spouse name: None   • Number of children: None   • Years of education: None   • Highest education level: None   Occupational History   • None   Tobacco Use   • Smoking status: Never   • Smokeless tobacco: Never   Vaping Use   • Vaping Use: Never used   Substance and Sexual Activity   • Alcohol use: Never   • Drug use: Never   • Sexual activity: Not Currently   Other Topics Concern   • None   Social History Narrative   • None     Social Determinants of Health     Financial Resource Strain: Not on file   Food Insecurity: No Food Insecurity (5/19/2022)    Hunger Vital Sign    • Worried About Running Out of Food in the Last Year: Never true    • Ran Out of Food in the Last Year: Never true   Transportation Needs: No Transportation Needs (5/19/2022)    PRAPARE - Transportation    • Lack of Transportation (Medical): No    • Lack of Transportation (Non-Medical):  No   Physical Activity: Not on file   Stress: Not on file   Social Connections: Not on file   Intimate Partner Violence: Not on file   Housing Stability: Low Risk  (5/19/2022)    Housing Stability Vital Sign    • Unable to Pay for Housing in the Last Year: No    • Number of Places Lived in the Last Year: 1    • Unstable Housing in the Last Year: No       Subjective    Subjective Data: awake and alert    Objective    Physical Exam:   Assessment Type: Assess only  General Appearance: Alert, Awake  Respiratory Pattern: Irregular, Spontaneous  Chest Assessment: Chest expansion symmetrical  Bilateral Breath Sounds: Clear, Diminished  Cough: Congested, Non-productive  O2 Device: room air    Vitals:  Blood pressure 124/72, pulse 85, temperature 98.2 °F (36.8 °C), resp. rate 16, height 5' 7" (1.702 m), weight 83.7 kg (184 lb 8.4 oz), SpO2 93 %. Imaging and other studies: I have personally reviewed pertinent reports.       O2 Device: room air     Plan    Respiratory Plan: Mild Distress pathway        Resp Comments: respiratory protocol completed pateint fredo admitted for dysphagia with sifgnificant pulm onary PM<H for COPD emphysema pateitn states he uses inhalers and nebulziers at home as needed and does have improvement patient complaining of chest congestion and will be ordered nebulizer therapy TID in attempt to mobilize retained secretions

## 2023-08-02 NOTE — DISCHARGE SUMMARY
[address]  Discharge- Wellington Toth [birth year], 59 y.o. male MRN: [id number]  Unit/Bed#:  Encounter: [id number]  Primary Care Provider: Jen Simmons MD   Date and time admitted to hospital: 8/1/2023 12:28 PM    * Dysphagia  Assessment & Plan  Patient with reported episodes of sensation that the food is "being stuck" in his lower chest/esophagus and episodes of regurgitation of undigested food. Denies major issues with liquids. Also has developed some hoarseness recently. Clinically very suspicious for achalasia  · S/p EGD, Mild LA class B esophagitis. This is not the cause for his dysphagia. Otherwise normal upper endoscopy  · GI consult, appreciate recommendations. Optimize Protonix 40 mg twice daily  · If symptoms persist, would need follow-up with GI and outpatient barium swallow study. Also might likely benefit from outpatient pH/manometry  · Stable for discharge today. Coronary artery disease  Assessment & Plan  · Reported in chart  · Denies any chest pain  · Continue metoprolol    Primary hypertension  Assessment & Plan  · Continue home metoprolol and lisinopril    COPD (chronic obstructive pulmonary disease) ([facility])  Assessment & Plan  · Appears overall stable  · Continue PRN albuterol      Medical Problems     Resolved Problems  Date Reviewed: 8/2/2023   None       Discharging Physician / Practitioner: Jake Steen MD  PCP: Jen Simmons MD  Admission Date:   Admission Orders (From admission, onward)     Ordered        08/01/23 1520  Place in Observation  Once                      Discharge Date: 08/02/23    Consultations During Hospital Stay:  · GI    Procedures Performed:   · EGD    Significant Findings / Test Results:   EGD    Result Date: 8/2/2023  Impression: Mild LA class B esophagitis. This is not the cause for his dysphagia. Otherwise normal upper endoscopy. Patient was empirically dilated using 54 Hebrew savory dilator.  Biopsies taken for EOE as above. RECOMMENDATION:  Follow-up biopsies. Maximize PPI to 40 mg twice daily. Hold off on further EGDs. Patient has had 3 EGDs in the past with no obvious cause for dysphagia. Recommend outpatient video barium swallow. If biopsies unremarkable and video barium swallow is unremarkable then recommend pH/manometry testing as outpatient. CT chest abdomen pelvis wo contrast    Result Date: 8/1/2023  Impression: No acute pathology within the chest, abdomen or pelvis. Specifically, no evidence of bowel obstruction. Stable hepatic lesions compatible with hemangiomas on prior imaging. Colonic diverticulosis without diverticulitis. Limited study without IV or oral contrast.     Incidental Findings:   · NA     Test Results Pending at Discharge (will require follow up): · Biopsies from EGD     Outpatient Tests Requested:  · Recommend outpatient video barium swallow. If that study is unremarkable, would likely benefit from pH/manometry testing as outpatient    Complications: None    Reason for Admission: Dysphagia    Hospital Course:   John Maguire is a 59 y.o. male patient who originally presented to the hospital on 8/1/2023 due to dysphagia. Patient was seen and evaluated by GI. Underwent endoscopy. See findings above. No noted abnormalities on EGD that may be significantly causing dysphagia. Recommend outpatient follow-up with GI and video barium swallow study. If that testing remains unremarkable, would likely benefit from pH/manometry testing as outpatient. Will discharge on PPI twice daily. Follow-up with PCP postdischarge. Patient amenable with plans. Requesting for pain medications, sent to pharmacy. Please see above list of diagnoses and related plan for additional information. Condition at Discharge: stable    Discharge Day Visit / Exam:   * Please refer to separate progress note for these details *    Discussion with Family: Discussed with patient and family present at bedside. Discharge instructions/Information to patient and family:   See after visit summary for information provided to patient and family. Provisions for Follow-Up Care:  See after visit summary for information related to follow-up care and any pertinent home health orders. Disposition:   Home    Planned Readmission: None     Discharge Statement:  I spent 40 minutes discharging the patient. This time was spent on the day of discharge. I had direct contact with the patient on the day of discharge. Greater than 50% of the total time was spent examining patient, answering all patient questions, arranging and discussing plan of care with patient as well as directly providing post-discharge instructions. Additional time then spent on discharge activities. Discharge Medications:  See after visit summary for reconciled discharge medications provided to patient and/or family.       **Please Note: This note may have been constructed using a voice recognition system**

## 2023-08-02 NOTE — CONSULTS
Consultation - [address] [place] [address] [facility]  Ibis Barraza 59 y.o. male MRN: [id number]  Unit/Bed#:  Encounter: [id number]      Inpatient consult to gastroenterology  Consult performed by: Yi Woodson PA-C  Consult ordered by: Guerline Cazares MD           Reason for Consult / Principal Problem: Dysphagia    HPI: Ibis Barraza is a 59y.o. year old male who presents with a past medical history significant for hypertension, COPD, and coronary artery disease. Patient presents to [facility] emergency department yesterday with a chief complaint of dysphagia. Patient reports that for quite some time now he has been having solid food sticking in his esophagus. Patient reports episodes of regurgitation as well as vomiting of undigested food. Patient reports that he can tolerate liquids with no problem. Patient reports about a 20 pound weight loss over the past several months. Patient denies any abdominal pain. Patient denies any significant episodes of heartburn. Patient denies any diarrhea or constipation. Patient denies any melena or rectal bleeding. Patient's last EGD was in February 2022. Patient did have mucosal inflammation at the GE junction from her previous food bolus but no actual food was identified. Patient was dilated with a 47 Ukrainian savory dilator. REVIEW OF SYSTEMS:     CONSTITUTIONAL: Denies any fever, chills, or rigors. Good appetite, and no recent weight loss. HEENT: No earache or tinnitus. Denies hearing loss or visual disturbances. CARDIOVASCULAR: No chest pain or palpitations. RESPIRATORY: Denies any cough, hemoptysis, shortness of breath or dyspnea on exertion. GASTROINTESTINAL: As noted in the History of Present Illness. GENITOURINARY: No problems with urination. Denies any hematuria or dysuria. NEUROLOGIC: No dizziness or vertigo, denies headaches. MUSCULOSKELETAL: Denies any muscle or joint pain. SKIN: Denies skin rashes or itching. ENDOCRINE: Denies excessive thirst. Denies intolerance to heat or cold. PSYCHOSOCIAL: Denies depression or anxiety. Denies any recent memory loss. Historical Information   Past Medical History:   Diagnosis Date   • Angina pectoris ([address])    • Asthma    • Cancer ([address])     liver   • Colon polyp    • Coronary artery disease    • Heart attack ([address])    • Hyperlipidemia    • Hypertension    • Liver disease    • Shortness of breath      Past Surgical History:   Procedure Laterality Date   • BACK SURGERY     • CHOLECYSTECTOMY     • COLONOSCOPY     • CORONARY ANGIOPLASTY WITH STENT PLACEMENT     • HERNIA REPAIR     • HERNIA REPAIR Left 5/17/2022    Procedure: laparoscopic attempted converted to open LEFT inguinal hernia repair with mesh;  Surgeon: Loki Aldana MD;  Location: [facility];  Service: General   • INGUINAL HERNIA REPAIR Left 05/17/2022    laparoscopic attempted converted to open L inguinal hernia repair w/mesh, Dr. Lisa Oropeza   • KNEE ARTHROSCOPY     • TONSILLECTOMY       Social History   Social History     Substance and Sexual Activity   Alcohol Use Never     Social History     Substance and Sexual Activity   Drug Use Never     Social History     Tobacco Use   Smoking Status Never   Smokeless Tobacco Never     History reviewed. No pertinent family history.     Meds/Allergies     Medications Prior to Admission   Medication   • albuterol (2.5 mg/3 mL) 0.083 % nebulizer solution   • atorvastatin (LIPITOR) 40 mg tablet   • dicyclomine (BENTYL) 10 mg capsule   • dicyclomine (BENTYL) 20 mg tablet   • docusate sodium (COLACE) 100 mg capsule   • lisinopril (ZESTRIL) 10 mg tablet   • metoprolol tartrate (LOPRESSOR) 25 mg tablet   • nitroglycerin (NITROSTAT) 0.3 mg SL tablet   • omeprazole (PriLOSEC) 20 mg delayed release capsule   • ondansetron (ZOFRAN-ODT) 4 mg disintegrating tablet   • oxyCODONE-acetaminophen (PERCOCET) 5-325 mg per tablet   • Probiotic Product (Bacid) TABS   • raNITIdine HCl (ZANTAC 75 PO) • traMADol-acetaminophen (ULTRACET) 37.5-325 mg per tablet     Current Facility-Administered Medications   Medication Dose Route Frequency   • albuterol (PROVENTIL HFA,VENTOLIN HFA) inhaler 2 puff  2 puff Inhalation Q4H PRN   • atorvastatin (LIPITOR) tablet 40 mg  40 mg Oral Daily   • heparin (porcine) subcutaneous injection 5,000 Units  5,000 Units Subcutaneous Q8H [address]   • levalbuterol (XOPENEX) inhalation solution 1.25 mg  1.25 mg Nebulization TID   • metoprolol tartrate (LOPRESSOR) tablet 25 mg  25 mg Oral Q12H [address]   • oxyCODONE-acetaminophen (PERCOCET) 5-325 mg per tablet 1 tablet  1 tablet Oral BID PRN       Allergies   Allergen Reactions   • Iodine - Food Allergy Anaphylaxis   • Penicillins Anaphylaxis   • Ibuprofen GI Bleeding       Objective   Blood pressure 134/64, pulse 74, temperature 97.7 °F (36.5 °C), temperature source Oral, resp. rate 16, height 5' 7" (1.702 m), weight 83.7 kg (184 lb 8.4 oz), SpO2 95 %. Intake/Output Summary (Last 24 hours) at 8/2/2023 1130  Last data filed at 8/2/2023 0800  Gross per 24 hour   Intake 180 ml   Output 0 ml   Net 180 ml         PHYSICAL EXAM:      General Appearance:   Alert, cooperative, no distress, appears stated age    HEENT:   Normocephalic, atraumatic, anicteric.     Neck:  Supple, symmetrical, trachea midline, no adenopathy;    thyroid: no enlargement/tenderness/nodules; no carotid  bruit or JVD    Lungs:   Clear to auscultation bilaterally; no rales, rhonchi or wheezing; respirations unlabored    Heart[de-identified]   S1 and S2 normal; regular rate and rhythm; no murmur, rub, or gallop.    Abdomen:   Soft, non-tender, non-distended; normal bowel sounds; no masses, no organomegaly    Genitalia:   Deferred    Rectal:   Deferred    Extremities:  No cyanosis, clubbing or edema    Pulses:  2+ and symmetric all extremities    Skin:  Skin color, texture, turgor normal, no rashes or lesions    Lymph nodes:  No palpable cervical, axillary or inguinal lymphadenopathy        Lab Results:   Admission on 08/01/2023   Component Date Value   • Sodium 08/01/2023 136    • Potassium 08/01/2023 3.8    • Chloride 08/01/2023 103    • CO2 08/01/2023 26    • ANION GAP 08/01/2023 7    • BUN 08/01/2023 14    • Creatinine 08/01/2023 0.86    • Glucose 08/01/2023 110    • Calcium 08/01/2023 9.7    • AST 08/01/2023 14    • ALT 08/01/2023 9    • Alkaline Phosphatase 08/01/2023 60    • Total Protein 08/01/2023 7.6    • Albumin 08/01/2023 4.7    • Total Bilirubin 08/01/2023 1.21 (H)    • eGFR 08/01/2023 92    • WBC 08/01/2023 14.04 (H)    • RBC 08/01/2023 6.52 (H)    • Hemoglobin 08/01/2023 12.2    • Hematocrit 08/01/2023 38.9    • MCV 08/01/2023 60 (L)    • MCH 08/01/2023 18.7 (L)    • MCHC 08/01/2023 31.4    • RDW 08/01/2023 19.0 (H)    • MPV 08/01/2023 8.9    • Platelets 54/60/3235 337    • nRBC 08/01/2023 0    • Neutrophils Relative 08/01/2023 84 (H)    • Immat GRANS % 08/01/2023 0    • Lymphocytes Relative 08/01/2023 11 (L)    • Monocytes Relative 08/01/2023 4    • Eosinophils Relative 08/01/2023 1    • Basophils Relative 08/01/2023 0    • Neutrophils Absolute 08/01/2023 11.79 (H)    • Immature Grans Absolute 08/01/2023 0.05    • Lymphocytes Absolute 08/01/2023 1.49    • Monocytes Absolute 08/01/2023 0.57    • Eosinophils Absolute 08/01/2023 0.10    • Basophils Absolute 08/01/2023 0.04    • Lipase 08/01/2023 7 (L)    • hs TnI 0hr 08/01/2023 6    • Ventricular Rate 08/01/2023 86    • Atrial Rate 08/01/2023 86    • AK Interval 08/01/2023 192    • QRSD Interval 08/01/2023 100    • QT Interval 08/01/2023 364    • QTC Interval 08/01/2023 435    • P Axis 08/01/2023 38    • QRS Axis 08/01/2023 45    • T Wave Axis 08/01/2023 18    • hs TnI 4hr 08/01/2023 6    • Delta 4hr hsTnI 08/01/2023 0    • WBC 08/02/2023 7.01    • RBC 08/02/2023 5.84 (H)    • Hemoglobin 08/02/2023 10.9 (L)    • Hematocrit 08/02/2023 35.0 (L)    • MCV 08/02/2023 60 (L)    • MCH 08/02/2023 18.7 (L)    • MCHC 08/02/2023 31.1 (L)    • RDW 08/02/2023 18.4 (H)    • MPV 08/02/2023 9.3    • Platelets 63/89/4021 295    • nRBC 08/02/2023 0    • Neutrophils Relative 08/02/2023 59    • Immat GRANS % 08/02/2023 0    • Lymphocytes Relative 08/02/2023 29    • Monocytes Relative 08/02/2023 7    • Eosinophils Relative 08/02/2023 4    • Basophils Relative 08/02/2023 1    • Neutrophils Absolute 08/02/2023 4.17    • Immature Grans Absolute 08/02/2023 0.03    • Lymphocytes Absolute 08/02/2023 2.00    • Monocytes Absolute 08/02/2023 0.49    • Eosinophils Absolute 08/02/2023 0.28    • Basophils Absolute 08/02/2023 0.04    • Sodium 08/02/2023 137    • Potassium 08/02/2023 3.4 (L)    • Chloride 08/02/2023 104    • CO2 08/02/2023 29    • ANION GAP 08/02/2023 4    • BUN 08/02/2023 15    • Creatinine 08/02/2023 0.92    • Glucose 08/02/2023 122    • Glucose, Fasting 08/02/2023 122 (H)    • Calcium 08/02/2023 9.4    • AST 08/02/2023 11 (L)    • ALT 08/02/2023 8    • Alkaline Phosphatase 08/02/2023 55    • Total Protein 08/02/2023 6.7    • Albumin 08/02/2023 4.1    • Total Bilirubin 08/02/2023 0.88    • eGFR 08/02/2023 88        Imaging Studies: I have personally reviewed pertinent imaging studies. ASSESSMENT & PLAN:  Dysphagia to solids  History of food impaction  Weight loss  -Patient presents to the [facility] emergency department with a chief complaint of dysphagia to solids.  -Patient's last EGD from February 2022 shows mucosal inflammation at the GE junction  -Patient is currently n.p.o. in preparation for EGD this afternoon.  -Pantoprazole 40 mg twice daily.  -Further recommendations be made after patient's EGD is complete today. Patient will be seen and examined by Dr. Camryn Maddox. All key medical decisions were made by Dr. Camryn Maddox.     Erica Law PA-C  8/2/2023,11:30 AM

## 2023-08-07 ENCOUNTER — TELEPHONE (OUTPATIENT)
Age: 63
End: 2023-08-07

## 2023-08-07 PROCEDURE — 88305 TISSUE EXAM BY PATHOLOGIST: CPT | Performed by: PATHOLOGY

## 2023-08-07 NOTE — TELEPHONE ENCOUNTER
----- Message from Juwan Malone MD sent at 8/7/2023 11:11 AM EDT -----  Hi. Could we please call patient let him know biopsies of the esophagus were unremarkable. He can follow-up outpatient with PA where we can consider video barium swallow versus manometry testing. Thank you.

## 2023-08-15 ENCOUNTER — TELEPHONE (OUTPATIENT)
Dept: GASTROENTEROLOGY | Facility: CLINIC | Age: 63
End: 2023-08-15

## 2023-08-15 NOTE — TELEPHONE ENCOUNTER
Called patient No Show today w/Teresita  He said he has Covid and has a lot of health issues, he will call back to reschedule when he feels better

## 2023-08-19 ENCOUNTER — HOSPITAL ENCOUNTER (EMERGENCY)
Facility: HOSPITAL | Age: 63
Discharge: HOME | End: 2023-08-19
Attending: EMERGENCY MEDICINE | Admitting: EMERGENCY MEDICINE
Payer: COMMERCIAL

## 2023-08-19 ENCOUNTER — APPOINTMENT (EMERGENCY)
Dept: RADIOLOGY | Facility: HOSPITAL | Age: 63
End: 2023-08-19
Payer: COMMERCIAL

## 2023-08-19 ENCOUNTER — HOSPITAL ENCOUNTER (EMERGENCY)
Facility: HOSPITAL | Age: 63
Discharge: STILL ADMITTED OR ADMINISTRATIVE | End: 2023-08-19
Payer: COMMERCIAL

## 2023-08-19 VITALS
TEMPERATURE: 98.4 F | HEART RATE: 83 BPM | OXYGEN SATURATION: 99 % | RESPIRATION RATE: 18 BRPM | SYSTOLIC BLOOD PRESSURE: 132 MMHG | DIASTOLIC BLOOD PRESSURE: 66 MMHG

## 2023-08-19 DIAGNOSIS — J44.1 COPD EXACERBATION (HCC): Primary | ICD-10-CM

## 2023-08-19 LAB
2HR DELTA HS TROPONIN: -2 NG/L
ALBUMIN SERPL BCP-MCNC: 4.4 G/DL (ref 3.5–5)
ALP SERPL-CCNC: 57 U/L (ref 34–104)
ALT SERPL W P-5'-P-CCNC: 15 U/L (ref 7–52)
ANION GAP SERPL CALCULATED.3IONS-SCNC: 9 MMOL/L
AST SERPL W P-5'-P-CCNC: 22 U/L (ref 13–39)
BASOPHILS # BLD AUTO: 0.01 THOUSANDS/ÂΜL (ref 0–0.1)
BASOPHILS NFR BLD AUTO: 0 % (ref 0–1)
BILIRUB SERPL-MCNC: 0.9 MG/DL (ref 0.2–1)
BUN SERPL-MCNC: 17 MG/DL (ref 5–25)
CALCIUM SERPL-MCNC: 9.5 MG/DL (ref 8.4–10.2)
CARDIAC TROPONIN I PNL SERPL HS: 2 NG/L
CARDIAC TROPONIN I PNL SERPL HS: 4 NG/L
CHLORIDE SERPL-SCNC: 103 MMOL/L (ref 96–108)
CO2 SERPL-SCNC: 27 MMOL/L (ref 21–32)
CREAT SERPL-MCNC: 1 MG/DL (ref 0.6–1.3)
EOSINOPHIL # BLD AUTO: 0.15 THOUSAND/ÂΜL (ref 0–0.61)
EOSINOPHIL NFR BLD AUTO: 2 % (ref 0–6)
ERYTHROCYTE [DISTWIDTH] IN BLOOD BY AUTOMATED COUNT: 17.2 % (ref 11.6–15.1)
GFR SERPL CREATININE-BSD FRML MDRD: 80 ML/MIN/1.73SQ M
GLUCOSE SERPL-MCNC: 179 MG/DL (ref 65–140)
HCT VFR BLD AUTO: 33.3 % (ref 36.5–49.3)
HGB BLD-MCNC: 10.8 G/DL (ref 12–17)
IMM GRANULOCYTES # BLD AUTO: 0.01 THOUSAND/UL (ref 0–0.2)
IMM GRANULOCYTES NFR BLD AUTO: 0 % (ref 0–2)
LYMPHOCYTES # BLD AUTO: 1.36 THOUSANDS/ÂΜL (ref 0.6–4.47)
LYMPHOCYTES NFR BLD AUTO: 21 % (ref 14–44)
MCH RBC QN AUTO: 18.9 PG (ref 26.8–34.3)
MCHC RBC AUTO-ENTMCNC: 32.4 G/DL (ref 31.4–37.4)
MCV RBC AUTO: 58 FL (ref 82–98)
MONOCYTES # BLD AUTO: 0.49 THOUSAND/ÂΜL (ref 0.17–1.22)
MONOCYTES NFR BLD AUTO: 8 % (ref 4–12)
NEUTROPHILS # BLD AUTO: 4.46 THOUSANDS/ÂΜL (ref 1.85–7.62)
NEUTS SEG NFR BLD AUTO: 69 % (ref 43–75)
NRBC BLD AUTO-RTO: 0 /100 WBCS
PLATELET # BLD AUTO: 312 THOUSANDS/UL (ref 149–390)
PMV BLD AUTO: 9.5 FL (ref 8.9–12.7)
POTASSIUM SERPL-SCNC: 3.5 MMOL/L (ref 3.5–5.3)
PROT SERPL-MCNC: 7.4 G/DL (ref 6.4–8.4)
RBC # BLD AUTO: 5.71 MILLION/UL (ref 3.88–5.62)
SODIUM SERPL-SCNC: 139 MMOL/L (ref 135–147)
WBC # BLD AUTO: 6.48 THOUSAND/UL (ref 4.31–10.16)

## 2023-08-19 PROCEDURE — 99285 EMERGENCY DEPT VISIT HI MDM: CPT

## 2023-08-19 PROCEDURE — 84484 ASSAY OF TROPONIN QUANT: CPT | Performed by: EMERGENCY MEDICINE

## 2023-08-19 PROCEDURE — 85025 COMPLETE CBC W/AUTO DIFF WBC: CPT | Performed by: EMERGENCY MEDICINE

## 2023-08-19 PROCEDURE — 36415 COLL VENOUS BLD VENIPUNCTURE: CPT | Performed by: EMERGENCY MEDICINE

## 2023-08-19 PROCEDURE — 93005 ELECTROCARDIOGRAM TRACING: CPT

## 2023-08-19 PROCEDURE — 80053 COMPREHEN METABOLIC PANEL: CPT | Performed by: EMERGENCY MEDICINE

## 2023-08-19 PROCEDURE — 99284 EMERGENCY DEPT VISIT MOD MDM: CPT | Performed by: EMERGENCY MEDICINE

## 2023-08-19 PROCEDURE — 71045 X-RAY EXAM CHEST 1 VIEW: CPT

## 2023-08-19 PROCEDURE — 94640 AIRWAY INHALATION TREATMENT: CPT

## 2023-08-19 PROCEDURE — 96374 THER/PROPH/DIAG INJ IV PUSH: CPT

## 2023-08-19 RX ORDER — ALBUTEROL SULFATE 2.5 MG/3ML
5 SOLUTION RESPIRATORY (INHALATION) ONCE
Status: COMPLETED | OUTPATIENT
Start: 2023-08-19 | End: 2023-08-19

## 2023-08-19 RX ORDER — PREDNISONE 20 MG/1
40 TABLET ORAL DAILY
Qty: 10 TABLET | Refills: 0 | Status: SHIPPED | OUTPATIENT
Start: 2023-08-19 | End: 2023-08-24

## 2023-08-19 RX ORDER — ASPIRIN 81 MG/1
324 TABLET, CHEWABLE ORAL ONCE
Status: COMPLETED | OUTPATIENT
Start: 2023-08-19 | End: 2023-08-19

## 2023-08-19 RX ORDER — ALBUTEROL SULFATE 90 UG/1
2 AEROSOL, METERED RESPIRATORY (INHALATION) ONCE
Status: DISCONTINUED | OUTPATIENT
Start: 2023-08-19 | End: 2023-08-20 | Stop reason: HOSPADM

## 2023-08-19 RX ORDER — METHYLPREDNISOLONE SODIUM SUCCINATE 125 MG/2ML
125 INJECTION, POWDER, LYOPHILIZED, FOR SOLUTION INTRAMUSCULAR; INTRAVENOUS ONCE
Status: COMPLETED | OUTPATIENT
Start: 2023-08-19 | End: 2023-08-19

## 2023-08-19 RX ADMIN — METHYLPREDNISOLONE SODIUM SUCCINATE 125 MG: 125 INJECTION, POWDER, FOR SOLUTION INTRAMUSCULAR; INTRAVENOUS at 19:51

## 2023-08-19 RX ADMIN — ALBUTEROL SULFATE 5 MG: 2.5 SOLUTION RESPIRATORY (INHALATION) at 19:54

## 2023-08-19 RX ADMIN — IPRATROPIUM BROMIDE 0.5 MG: 0.5 SOLUTION RESPIRATORY (INHALATION) at 19:54

## 2023-08-19 RX ADMIN — ASPIRIN 324 MG: 81 TABLET, CHEWABLE ORAL at 19:50

## 2023-08-20 LAB
ATRIAL RATE: 87 BPM
P AXIS: 59 DEGREES
PR INTERVAL: 176 MS
QRS AXIS: 70 DEGREES
QRSD INTERVAL: 100 MS
QT INTERVAL: 390 MS
QTC INTERVAL: 469 MS
T WAVE AXIS: 68 DEGREES
VENTRICULAR RATE: 87 BPM

## 2023-08-20 PROCEDURE — 93010 ELECTROCARDIOGRAM REPORT: CPT | Performed by: INTERNAL MEDICINE

## 2023-08-20 NOTE — ED PROVIDER NOTES
History  Chief Complaint   Patient presents with   • Shortness of Breath     Pt came to the ED by police because he was pulled over while having a warrant. The pt has complaints of SOB. 62-year-old male presenting to the emergency department for evaluation of shortness of breath. Patient has history of CAD and COPD. Patient was being arrested by police today when he got very short of breath. Complains of some shortness of breath and chest tightness. Began suddenly when he was being arrested. Still persistent. No palpitations lightheadedness syncope or presyncope. Patient has substernal nonradiating chest pain. Is not exertional.  Patient does have some wheezes on exam.          Prior to Admission Medications   Prescriptions Last Dose Informant Patient Reported? Taking? Fluticasone-Salmeterol (Advair) 250-50 mcg/dose inhaler   Yes No   Sig: INHALE 1 PUFF BY MOUTH IN THE MORNING AND BEFORE BEDTIME   Probiotic Product (Bacid) TABS  Self Yes No   Sig: Take 1 tablet by mouth in the morning and 1 tablet in the evening. albuterol (2.5 mg/3 mL) 0.083 % nebulizer solution  Self Yes No   Sig: Take 2.5 mg by nebulization every 6 (six) hours as needed for wheezing or shortness of breath   atorvastatin (LIPITOR) 40 mg tablet  Self Yes No   Sig: Take 40 mg by mouth in the morning.    lisinopril (ZESTRIL) 10 mg tablet   Yes No   Sig: Take 10 mg by mouth daily   metoprolol tartrate (LOPRESSOR) 25 mg tablet   Yes No   Sig: Take 25 mg by mouth every 12 (twelve) hours   nitroglycerin (NITROSTAT) 0.3 mg SL tablet  Self Yes No   Sig: Place 0.3 mg under the tongue every 5 (five) minutes as needed for chest pain   ondansetron (ZOFRAN-ODT) 4 mg disintegrating tablet   No No   Sig: Take 1 tablet (4 mg total) by mouth every 8 (eight) hours as needed for nausea or vomiting   oxyCODONE-acetaminophen (PERCOCET) 5-325 mg per tablet   No No   Sig: Take 1 tablet by mouth 2 (two) times a day as needed for moderate pain or severe pain Max Daily Amount: 2 tablets   pantoprazole (PROTONIX) 40 mg tablet   No No   Sig: Take 1 tablet (40 mg total) by mouth 2 (two) times a day   tamsulosin (FLOMAX) 0.4 mg   Yes No   Sig: TAKE 2 CAPSULES BY MOUTH IN THE MORNING      Facility-Administered Medications: None       Past Medical History:   Diagnosis Date   • Angina pectoris (HCC)    • Asthma    • Cancer ([address])     liver   • Colon polyp    • Coronary artery disease    • Heart attack ([address])    • Hyperlipidemia    • Hypertension    • Liver disease    • Shortness of breath        Past Surgical History:   Procedure Laterality Date   • BACK SURGERY     • CHOLECYSTECTOMY     • COLONOSCOPY     • CORONARY ANGIOPLASTY WITH STENT PLACEMENT     • HERNIA REPAIR     • HERNIA REPAIR Left 5/17/2022    Procedure: laparoscopic attempted converted to open LEFT inguinal hernia repair with mesh;  Surgeon: Marta Champagne MD;  Location: [facility];  Service: General   • INGUINAL HERNIA REPAIR Left 05/17/2022    laparoscopic attempted converted to open L inguinal hernia repair w/mesh, Dr. Greg Morgan   • KNEE ARTHROSCOPY     • TONSILLECTOMY         No family history on file. I have reviewed and agree with the history as documented. E-Cigarette/Vaping   • E-Cigarette Use Never User      E-Cigarette/Vaping Substances   • Nicotine No    • THC No    • CBD No    • Flavoring No    • Other No    • Unknown No      Social History     Tobacco Use   • Smoking status: Never   • Smokeless tobacco: Never   Vaping Use   • Vaping Use: Never used   Substance Use Topics   • Alcohol use: Never   • Drug use: Never       Review of Systems   Constitutional: Negative for appetite change, chills, fatigue and fever. HENT: Negative for sneezing and sore throat. Eyes: Negative for visual disturbance. Respiratory: Positive for chest tightness and shortness of breath. Negative for cough, choking and wheezing. Cardiovascular: Negative for chest pain and palpitations.    Gastrointestinal: Negative for abdominal pain, constipation, diarrhea, nausea and vomiting. Genitourinary: Negative for difficulty urinating and dysuria. Neurological: Negative for dizziness, weakness, light-headedness, numbness and headaches. All other systems reviewed and are negative. Physical Exam  Physical Exam  Vitals and nursing note reviewed. Constitutional:       General: He is not in acute distress. Appearance: He is well-developed. He is not diaphoretic. HENT:      Head: Normocephalic and atraumatic. Eyes:      Pupils: Pupils are equal, round, and reactive to light. Neck:      Vascular: No JVD. Trachea: No tracheal deviation. Cardiovascular:      Rate and Rhythm: Normal rate and regular rhythm. Heart sounds: Normal heart sounds. No murmur heard. No friction rub. No gallop. Pulmonary:      Effort: Pulmonary effort is normal. No respiratory distress. Breath sounds: Wheezing present. No rales. Abdominal:      General: Bowel sounds are normal. There is no distension. Palpations: Abdomen is soft. Tenderness: There is no abdominal tenderness. There is no guarding or rebound. Skin:     General: Skin is warm and dry. Coloration: Skin is not pale. Neurological:      Mental Status: He is alert and oriented to person, place, and time. Cranial Nerves: No cranial nerve deficit. Motor: No abnormal muscle tone.    Psychiatric:         Behavior: Behavior normal.         Vital Signs  ED Triage Vitals [08/19/23 1910]   Temperature Pulse Respirations Blood Pressure SpO2   98.4 °F (36.9 °C) 85 18 129/75 95 %      Temp Source Heart Rate Source Patient Position - Orthostatic VS BP Location FiO2 (%)   Oral Monitor Lying Right arm --      Pain Score       --           Vitals:    08/19/23 1910 08/19/23 2100   BP: 129/75 132/66   Pulse: 85 83   Patient Position - Orthostatic VS: Lying          Visual Acuity      ED Medications  Medications   aspirin chewable tablet 324 mg (324 mg Oral Given 8/19/23 1950)   methylPREDNISolone sodium succinate (Solu-MEDROL) injection 125 mg (125 mg Intravenous Given 8/19/23 1951)   albuterol inhalation solution 5 mg (5 mg Nebulization Given 8/19/23 1954)   ipratropium (ATROVENT) 0.02 % inhalation solution 0.5 mg (0.5 mg Nebulization Given 8/19/23 1954)       Diagnostic Studies  Results Reviewed     Procedure Component Value Units Date/Time    HS Troponin I 2hr [[id number]]  (Normal) Collected: 08/19/23 2141    Lab Status: Final result Specimen: Blood from Arm, Left Updated: 08/19/23 2209     hs TnI 2hr 2 ng/L      Delta 2hr hsTnI -2 ng/L     HS Troponin 0hr (reflex protocol) [[id number]]  (Normal) Collected: 08/19/23 1935    Lab Status: Final result Specimen: Blood from Arm, Left Updated: 08/19/23 2004     hs TnI 0hr 4 ng/L     Comprehensive metabolic panel [[id number]]  (Abnormal) Collected: 08/19/23 1935    Lab Status: Final result Specimen: Blood from Arm, Left Updated: 08/19/23 1956     Sodium 139 mmol/L      Potassium 3.5 mmol/L      Chloride 103 mmol/L      CO2 27 mmol/L      ANION GAP 9 mmol/L      BUN 17 mg/dL      Creatinine 1.00 mg/dL      Glucose 179 mg/dL      Calcium 9.5 mg/dL      AST 22 U/L      ALT 15 U/L      Alkaline Phosphatase 57 U/L      Total Protein 7.4 g/dL      Albumin 4.4 g/dL      Total Bilirubin 0.90 mg/dL      eGFR 80 ml/min/1.73sq m     Narrative:      [place] guidelines for Chronic Kidney Disease (CKD):   •  Stage 1 with normal or high GFR (GFR > 90 mL/min/1.73 square meters)  •  Stage 2 Mild CKD (GFR = 60-89 mL/min/1.73 square meters)  •  Stage 3A Moderate CKD (GFR = 45-59 mL/min/1.73 square meters)  •  Stage 3B Moderate CKD (GFR = 30-44 mL/min/1.73 square meters)  •  Stage 4 Severe CKD (GFR = 15-29 mL/min/1.73 square meters)  •  Stage 5 End Stage CKD (GFR <15 mL/min/1.73 square meters)  Note: GFR calculation is accurate only with a steady state creatinine    CBC and differential [[id number]] (Abnormal) Collected: 08/19/23 1935    Lab Status: Final result Specimen: Blood from Arm, Left Updated: 08/19/23 1940     WBC 6.48 Thousand/uL      RBC 5.71 Million/uL      Hemoglobin 10.8 g/dL      Hematocrit 33.3 %      MCV 58 fL      MCH 18.9 pg      MCHC 32.4 g/dL      RDW 17.2 %      MPV 9.5 fL      Platelets 181 Thousands/uL      nRBC 0 /100 WBCs      Neutrophils Relative 69 %      Immat GRANS % 0 %      Lymphocytes Relative 21 %      Monocytes Relative 8 %      Eosinophils Relative 2 %      Basophils Relative 0 %      Neutrophils Absolute 4.46 Thousands/µL      Immature Grans Absolute 0.01 Thousand/uL      Lymphocytes Absolute 1.36 Thousands/µL      Monocytes Absolute 0.49 Thousand/µL      Eosinophils Absolute 0.15 Thousand/µL      Basophils Absolute 0.01 Thousands/µL                  XR chest 1 view portable    (Results Pending)              Procedures  Procedures         ED Course                                             Medical Decision Making  58-year-old male with chest pain, more likely COPD rather than ACS, will screen for ACS, check labs, troponin, EKG, chest x-ray, delta, give nebs, steroids, reassess. Amount and/or Complexity of Data Reviewed  Labs: ordered. Radiology: ordered. Risk  OTC drugs. Prescription drug management. Disposition  Final diagnoses:   COPD exacerbation ([address])     Time reflects when diagnosis was documented in both MDM as applicable and the Disposition within this note     Time User Action Codes Description Comment    8/19/2023 10:14 PM Thiago Silver Add [J44.1] COPD exacerbation [facility])       ED Disposition     ED Disposition   Discharge    Condition   Stable    Date/Time   Sat Aug 19, 2023 10:14 PM    Comment   Delia Mariee discharge to home/self care.                Follow-up Information    None         Discharge Medication List as of 8/19/2023 10:18 PM      START taking these medications    Details   predniSONE 20 mg tablet Take 2 tablets (40 mg total) by mouth daily for 5 days, Starting Sat 8/19/2023, Until Thu 8/24/2023, Print         CONTINUE these medications which have NOT CHANGED    Details   albuterol (2.5 mg/3 mL) 0.083 % nebulizer solution Take 2.5 mg by nebulization every 6 (six) hours as needed for wheezing or shortness of breath, Historical Med      atorvastatin (LIPITOR) 40 mg tablet Take 40 mg by mouth in the morning., Historical Med      Fluticasone-Salmeterol (Advair) 250-50 mcg/dose inhaler INHALE 1 PUFF BY MOUTH IN THE MORNING AND BEFORE BEDTIME, Historical Med      lisinopril (ZESTRIL) 10 mg tablet Take 10 mg by mouth daily, Historical Med      metoprolol tartrate (LOPRESSOR) 25 mg tablet Take 25 mg by mouth every 12 (twelve) hours, Historical Med      nitroglycerin (NITROSTAT) 0.3 mg SL tablet Place 0.3 mg under the tongue every 5 (five) minutes as needed for chest pain, Historical Med      ondansetron (ZOFRAN-ODT) 4 mg disintegrating tablet Take 1 tablet (4 mg total) by mouth every 8 (eight) hours as needed for nausea or vomiting, Starting Thu 10/20/2022, Print      oxyCODONE-acetaminophen (PERCOCET) 5-325 mg per tablet Take 1 tablet by mouth 2 (two) times a day as needed for moderate pain or severe pain Max Daily Amount: 2 tablets, Starting Wed 8/2/2023, Normal      pantoprazole (PROTONIX) 40 mg tablet Take 1 tablet (40 mg total) by mouth 2 (two) times a day, Starting Wed 8/2/2023, Until Fri 9/1/2023, Normal      Probiotic Product (Bacid) TABS Take 1 tablet by mouth in the morning and 1 tablet in the evening., Starting Tue 11/23/2021, Historical Med      tamsulosin (FLOMAX) 0.4 mg TAKE 2 CAPSULES BY MOUTH IN THE MORNING, Historical Med             No discharge procedures on file.     PDMP Review       Value Time User    PDMP Reviewed  Yes 5/17/2022  3:11 PM Mp Fernandez PA-C          ED Provider  Electronically Signed by           Amber Orlando MD  08/20/23 [id number]

## 2023-08-22 LAB
ATRIAL RATE: 84 BPM
P AXIS: 45 DEGREES
PR INTERVAL: 172 MS
QRS AXIS: 58 DEGREES
QRSD INTERVAL: 96 MS
QT INTERVAL: 382 MS
QTC INTERVAL: 451 MS
T WAVE AXIS: 68 DEGREES
VENTRICULAR RATE: 84 BPM

## 2023-09-13 ENCOUNTER — APPOINTMENT (EMERGENCY)
Dept: CT IMAGING | Facility: HOSPITAL | Age: 63
End: 2023-09-13
Payer: COMMERCIAL

## 2023-09-13 ENCOUNTER — HOSPITAL ENCOUNTER (EMERGENCY)
Facility: HOSPITAL | Age: 63
Discharge: HOME | End: 2023-09-13
Attending: EMERGENCY MEDICINE
Payer: COMMERCIAL

## 2023-09-13 VITALS
RESPIRATION RATE: 18 BRPM | BODY MASS INDEX: 28 KG/M2 | OXYGEN SATURATION: 99 % | DIASTOLIC BLOOD PRESSURE: 80 MMHG | TEMPERATURE: 98 F | WEIGHT: 178.79 LBS | HEART RATE: 80 BPM | SYSTOLIC BLOOD PRESSURE: 120 MMHG

## 2023-09-13 DIAGNOSIS — K62.5 PROCTOCOLITIS WITH RECTAL BLEEDING: Primary | ICD-10-CM

## 2023-09-13 DIAGNOSIS — K52.9 PROCTOCOLITIS WITH RECTAL BLEEDING: Primary | ICD-10-CM

## 2023-09-13 LAB
ABO GROUP BLD: NORMAL
ALBUMIN SERPL BCP-MCNC: 4.3 G/DL (ref 3.5–5)
ALP SERPL-CCNC: 62 U/L (ref 34–104)
ALT SERPL W P-5'-P-CCNC: 15 U/L (ref 7–52)
ANION GAP SERPL CALCULATED.3IONS-SCNC: 5 MMOL/L
AST SERPL W P-5'-P-CCNC: 12 U/L (ref 13–39)
BASOPHILS # BLD AUTO: 0.03 THOUSANDS/ÂΜL (ref 0–0.1)
BASOPHILS NFR BLD AUTO: 0 % (ref 0–1)
BILIRUB SERPL-MCNC: 0.88 MG/DL (ref 0.2–1)
BLD GP AB SCN SERPL QL: NEGATIVE
BUN SERPL-MCNC: 22 MG/DL (ref 5–25)
CALCIUM SERPL-MCNC: 9.2 MG/DL (ref 8.4–10.2)
CHLORIDE SERPL-SCNC: 104 MMOL/L (ref 96–108)
CO2 SERPL-SCNC: 29 MMOL/L (ref 21–32)
CREAT SERPL-MCNC: 0.78 MG/DL (ref 0.6–1.3)
EOSINOPHIL # BLD AUTO: 0.22 THOUSAND/ÂΜL (ref 0–0.61)
EOSINOPHIL NFR BLD AUTO: 3 % (ref 0–6)
ERYTHROCYTE [DISTWIDTH] IN BLOOD BY AUTOMATED COUNT: 17.8 % (ref 11.6–15.1)
GFR SERPL CREATININE-BSD FRML MDRD: 96 ML/MIN/1.73SQ M
GLUCOSE SERPL-MCNC: 120 MG/DL (ref 65–140)
HCT VFR BLD AUTO: 29 % (ref 36.5–49.3)
HGB BLD-MCNC: 9 G/DL (ref 12–17)
IMM GRANULOCYTES # BLD AUTO: 0.03 THOUSAND/UL (ref 0–0.2)
IMM GRANULOCYTES NFR BLD AUTO: 0 % (ref 0–2)
LYMPHOCYTES # BLD AUTO: 1.42 THOUSANDS/ÂΜL (ref 0.6–4.47)
LYMPHOCYTES NFR BLD AUTO: 16 % (ref 14–44)
MCH RBC QN AUTO: 18.5 PG (ref 26.8–34.3)
MCHC RBC AUTO-ENTMCNC: 31 G/DL (ref 31.4–37.4)
MCV RBC AUTO: 60 FL (ref 82–98)
MONOCYTES # BLD AUTO: 0.56 THOUSAND/ÂΜL (ref 0.17–1.22)
MONOCYTES NFR BLD AUTO: 6 % (ref 4–12)
NEUTROPHILS # BLD AUTO: 6.62 THOUSANDS/ÂΜL (ref 1.85–7.62)
NEUTS SEG NFR BLD AUTO: 75 % (ref 43–75)
NRBC BLD AUTO-RTO: 0 /100 WBCS
PLATELET # BLD AUTO: 228 THOUSANDS/UL (ref 149–390)
PMV BLD AUTO: 9.5 FL (ref 8.9–12.7)
POTASSIUM SERPL-SCNC: 3.7 MMOL/L (ref 3.5–5.3)
PROT SERPL-MCNC: 6.9 G/DL (ref 6.4–8.4)
RBC # BLD AUTO: 4.87 MILLION/UL (ref 3.88–5.62)
RH BLD: POSITIVE
SODIUM SERPL-SCNC: 138 MMOL/L (ref 135–147)
SPECIMEN EXPIRATION DATE: NORMAL
WBC # BLD AUTO: 8.88 THOUSAND/UL (ref 4.31–10.16)

## 2023-09-13 PROCEDURE — 86901 BLOOD TYPING SEROLOGIC RH(D): CPT

## 2023-09-13 PROCEDURE — 99285 EMERGENCY DEPT VISIT HI MDM: CPT

## 2023-09-13 PROCEDURE — 86900 BLOOD TYPING SEROLOGIC ABO: CPT

## 2023-09-13 PROCEDURE — 36430 TRANSFUSION BLD/BLD COMPNT: CPT

## 2023-09-13 PROCEDURE — P9016 RBC LEUKOCYTES REDUCED: HCPCS

## 2023-09-13 PROCEDURE — 86920 COMPATIBILITY TEST SPIN: CPT

## 2023-09-13 PROCEDURE — 82272 OCCULT BLD FECES 1-3 TESTS: CPT

## 2023-09-13 PROCEDURE — 96361 HYDRATE IV INFUSION ADD-ON: CPT

## 2023-09-13 PROCEDURE — 96374 THER/PROPH/DIAG INJ IV PUSH: CPT

## 2023-09-13 PROCEDURE — 36415 COLL VENOUS BLD VENIPUNCTURE: CPT

## 2023-09-13 PROCEDURE — 74177 CT ABD & PELVIS W/CONTRAST: CPT

## 2023-09-13 PROCEDURE — 80053 COMPREHEN METABOLIC PANEL: CPT

## 2023-09-13 PROCEDURE — 96375 TX/PRO/DX INJ NEW DRUG ADDON: CPT

## 2023-09-13 PROCEDURE — 86850 RBC ANTIBODY SCREEN: CPT

## 2023-09-13 PROCEDURE — 85025 COMPLETE CBC W/AUTO DIFF WBC: CPT

## 2023-09-13 PROCEDURE — C9113 INJ PANTOPRAZOLE SODIUM, VIA: HCPCS

## 2023-09-13 RX ORDER — PANTOPRAZOLE SODIUM 40 MG/10ML
40 INJECTION, POWDER, LYOPHILIZED, FOR SOLUTION INTRAVENOUS ONCE
Status: COMPLETED | OUTPATIENT
Start: 2023-09-13 | End: 2023-09-13

## 2023-09-13 RX ORDER — DIPHENHYDRAMINE HYDROCHLORIDE 50 MG/ML
25 INJECTION INTRAMUSCULAR; INTRAVENOUS ONCE
Status: COMPLETED | OUTPATIENT
Start: 2023-09-13 | End: 2023-09-13

## 2023-09-13 RX ADMIN — SODIUM CHLORIDE 1000 ML: 0.9 INJECTION, SOLUTION INTRAVENOUS at 08:50

## 2023-09-13 RX ADMIN — IOHEXOL 100 ML: 350 INJECTION, SOLUTION INTRAVENOUS at 09:36

## 2023-09-13 RX ADMIN — DIPHENHYDRAMINE HYDROCHLORIDE 25 MG: 50 INJECTION, SOLUTION INTRAMUSCULAR; INTRAVENOUS at 08:50

## 2023-09-13 RX ADMIN — PANTOPRAZOLE SODIUM 40 MG: 40 INJECTION, POWDER, FOR SOLUTION INTRAVENOUS at 08:50

## 2023-09-13 NOTE — Clinical Note
Case was discussed with Dr. Gwendolyn Cruz and the patient's admission status was agreed to be Admission Status: observation status to the service of Dr. Delmy Bernstein .

## 2023-09-13 NOTE — DISCHARGE INSTRUCTIONS
Follow up with PCP  Return to the ED for continued rectal bleeding.      Return to the ED with new or worsening symptoms

## 2023-09-13 NOTE — ED PROVIDER NOTES
History  Chief Complaint   Patient presents with   • Abdominal Pain   • Rectal Bleeding     Reports large amount of rectal bleeding with clots since yesterday. Generalized abd pain. Patient is a 23-year-old male the past medical history of liver cancer, coronary artery disease, hyperlipidemia, hypertension, asthma, MI presenting emergency department for evaluation of rectal bleeding. Patient reports yesterday he began having bright red blood and clots from his rectum. Patient reports he has slight lower abdominal discomfort. Patient reports the rectal bleeding has not stopped and has continued throughout the evening into this morning. Patient reports the blood is continuously coming out of his rectum. Patient reports it does not only with bowel movements. Reports he is not on any blood thinners. Patient denies ever having rectal bleeding in the past.  Patient reports lightheadedness associated with the rectal bleeding. Denies fevers, chills, rash, headache, weakness, dizziness, visual changes, nausea, vomiting, diarrhea, constipation, chest pain, shortness of breath or difficulty breathing. Does not offer any other concerns or complaints. Prior to Admission Medications   Prescriptions Last Dose Informant Patient Reported? Taking? Fluticasone-Salmeterol (Advair) 250-50 mcg/dose inhaler   Yes No   Sig: INHALE 1 PUFF BY MOUTH IN THE MORNING AND BEFORE BEDTIME   Probiotic Product (Bacid) TABS  Self Yes No   Sig: Take 1 tablet by mouth in the morning and 1 tablet in the evening. albuterol (2.5 mg/3 mL) 0.083 % nebulizer solution  Self Yes No   Sig: Take 2.5 mg by nebulization every 6 (six) hours as needed for wheezing or shortness of breath   atorvastatin (LIPITOR) 40 mg tablet  Self Yes No   Sig: Take 40 mg by mouth in the morning.    lisinopril (ZESTRIL) 10 mg tablet   Yes No   Sig: Take 10 mg by mouth daily   metoprolol tartrate (LOPRESSOR) 25 mg tablet   Yes No   Sig: Take 25 mg by mouth every 12 (twelve) hours   nitroglycerin (NITROSTAT) 0.3 mg SL tablet  Self Yes No   Sig: Place 0.3 mg under the tongue every 5 (five) minutes as needed for chest pain   ondansetron (ZOFRAN-ODT) 4 mg disintegrating tablet   No No   Sig: Take 1 tablet (4 mg total) by mouth every 8 (eight) hours as needed for nausea or vomiting   oxyCODONE-acetaminophen (PERCOCET) 5-325 mg per tablet   No No   Sig: Take 1 tablet by mouth 2 (two) times a day as needed for moderate pain or severe pain Max Daily Amount: 2 tablets   pantoprazole (PROTONIX) 40 mg tablet   No No   Sig: Take 1 tablet (40 mg total) by mouth 2 (two) times a day   tamsulosin (FLOMAX) 0.4 mg   Yes No   Sig: TAKE 2 CAPSULES BY MOUTH IN THE MORNING      Facility-Administered Medications: None       Past Medical History:   Diagnosis Date   • Angina pectoris (HCC)    • Asthma    • Cancer ([address])     liver   • Colon polyp    • Coronary artery disease    • Heart attack ([address])    • Hyperlipidemia    • Hypertension    • Liver disease    • Shortness of breath        Past Surgical History:   Procedure Laterality Date   • BACK SURGERY     • CHOLECYSTECTOMY     • COLONOSCOPY     • CORONARY ANGIOPLASTY WITH STENT PLACEMENT     • HERNIA REPAIR     • HERNIA REPAIR Left 5/17/2022    Procedure: laparoscopic attempted converted to open LEFT inguinal hernia repair with mesh;  Surgeon: Konrad Manuel MD;  Location: [facility];  Service: General   • INGUINAL HERNIA REPAIR Left 05/17/2022    laparoscopic attempted converted to open L inguinal hernia repair w/mesh, Dr. Lee Sánchez   • KNEE ARTHROSCOPY     • TONSILLECTOMY         History reviewed. No pertinent family history. I have reviewed and agree with the history as documented.     E-Cigarette/Vaping   • E-Cigarette Use Never User      E-Cigarette/Vaping Substances   • Nicotine No    • THC No    • CBD No    • Flavoring No    • Other No    • Unknown No      Social History     Tobacco Use   • Smoking status: Never   • Smokeless tobacco: Never   Vaping Use   • Vaping Use: Never used   Substance Use Topics   • Alcohol use: Never   • Drug use: Never       Review of Systems   Constitutional: Negative for chills and fever. HENT: Negative for ear pain and sore throat. Eyes: Negative for pain and visual disturbance. Respiratory: Negative for cough and shortness of breath. Cardiovascular: Negative for chest pain and palpitations. Gastrointestinal: Positive for abdominal pain. Negative for diarrhea, nausea and vomiting. Rectal bleeding   Genitourinary: Negative for dysuria and hematuria. Musculoskeletal: Negative for arthralgias and back pain. Skin: Negative for color change and rash. Neurological: Positive for light-headedness. Negative for seizures and syncope. All other systems reviewed and are negative. Physical Exam  Physical Exam  Vitals and nursing note reviewed. Constitutional:       General: He is not in acute distress. Appearance: Normal appearance. He is well-developed. He is not toxic-appearing or diaphoretic. HENT:      Head: Normocephalic and atraumatic. Right Ear: External ear normal.      Left Ear: External ear normal.      Nose: Nose normal.      Mouth/Throat:      Mouth: Mucous membranes are moist.   Eyes:      General: No scleral icterus. Right eye: No discharge. Left eye: No discharge. Conjunctiva/sclera: Conjunctivae normal.   Cardiovascular:      Rate and Rhythm: Normal rate and regular rhythm. Heart sounds: No murmur heard. Pulmonary:      Effort: Pulmonary effort is normal. No respiratory distress. Breath sounds: Normal breath sounds. Abdominal:      Palpations: Abdomen is soft. Tenderness: There is abdominal tenderness in the left lower quadrant. Genitourinary:     Rectum: Guaiac result positive. Musculoskeletal:         General: No swelling, deformity or signs of injury. Normal range of motion.       Cervical back: Normal range of motion and neck supple. No rigidity. Skin:     General: Skin is warm and dry. Capillary Refill: Capillary refill takes less than 2 seconds. Coloration: Skin is not jaundiced. Findings: No erythema or rash. Neurological:      General: No focal deficit present. Mental Status: He is alert and oriented to person, place, and time. Mental status is at baseline. Cranial Nerves: No cranial nerve deficit. Gait: Gait normal.   Psychiatric:         Mood and Affect: Mood normal.         Behavior: Behavior normal.         Thought Content:  Thought content normal.         Judgment: Judgment normal.         Vital Signs  ED Triage Vitals [09/13/23 0819]   Temperature Pulse Respirations Blood Pressure SpO2   98.7 °F (37.1 °C) (!) 114 20 138/76 96 %      Temp Source Heart Rate Source Patient Position - Orthostatic VS BP Location FiO2 (%)   Oral Monitor Sitting Right arm --      Pain Score       6           Vitals:    09/13/23 1104 09/13/23 1110 09/13/23 1125 09/13/23 1223   BP: 122/74 120/70 125/80 120/80   Pulse: 92 90 90 80   Patient Position - Orthostatic VS:             Visual Acuity      ED Medications  Medications   sodium chloride 0.9 % bolus 1,000 mL (0 mL Intravenous Stopped 9/13/23 1017)   pantoprazole (PROTONIX) injection 40 mg (40 mg Intravenous Given 9/13/23 0850)   diphenhydrAMINE (BENADRYL) injection 25 mg (25 mg Intravenous Given 9/13/23 0850)   iohexol (OMNIPAQUE) 350 MG/ML injection (MULTI-DOSE) 100 mL (100 mL Intravenous Given 9/13/23 0936)       Diagnostic Studies  Results Reviewed     Procedure Component Value Units Date/Time    Comprehensive metabolic panel [[id number]]  (Abnormal) Collected: 09/13/23 0851    Lab Status: Final result Specimen: Blood from Arm, Right Updated: 09/13/23 0921     Sodium 138 mmol/L      Potassium 3.7 mmol/L      Chloride 104 mmol/L      CO2 29 mmol/L      ANION GAP 5 mmol/L      BUN 22 mg/dL      Creatinine 0.78 mg/dL      Glucose 120 mg/dL      Calcium 9.2 mg/dL AST 12 U/L      ALT 15 U/L      Alkaline Phosphatase 62 U/L      Total Protein 6.9 g/dL      Albumin 4.3 g/dL      Total Bilirubin 0.88 mg/dL      eGFR 96 ml/min/1.73sq m     Narrative:      [place] guidelines for Chronic Kidney Disease (CKD):   •  Stage 1 with normal or high GFR (GFR > 90 mL/min/1.73 square meters)  •  Stage 2 Mild CKD (GFR = 60-89 mL/min/1.73 square meters)  •  Stage 3A Moderate CKD (GFR = 45-59 mL/min/1.73 square meters)  •  Stage 3B Moderate CKD (GFR = 30-44 mL/min/1.73 square meters)  •  Stage 4 Severe CKD (GFR = 15-29 mL/min/1.73 square meters)  •  Stage 5 End Stage CKD (GFR <15 mL/min/1.73 square meters)  Note: GFR calculation is accurate only with a steady state creatinine    CBC and differential [[id number]]  (Abnormal) Collected: 09/13/23 0851    Lab Status: Final result Specimen: Blood from Arm, Right Updated: 09/13/23 0903     WBC 8.88 Thousand/uL      RBC 4.87 Million/uL      Hemoglobin 9.0 g/dL      Hematocrit 29.0 %      MCV 60 fL      MCH 18.5 pg      MCHC 31.0 g/dL      RDW 17.8 %      MPV 9.5 fL      Platelets 326 Thousands/uL      nRBC 0 /100 WBCs      Neutrophils Relative 75 %      Immat GRANS % 0 %      Lymphocytes Relative 16 %      Monocytes Relative 6 %      Eosinophils Relative 3 %      Basophils Relative 0 %      Neutrophils Absolute 6.62 Thousands/µL      Immature Grans Absolute 0.03 Thousand/uL      Lymphocytes Absolute 1.42 Thousands/µL      Monocytes Absolute 0.56 Thousand/µL      Eosinophils Absolute 0.22 Thousand/µL      Basophils Absolute 0.03 Thousands/µL                  CT abdomen pelvis with contrast   Final Result by Shyam Bhatti MD (09/13 6362)      Proctocolitis. Colonic diverticulosis. Stable large hepatic hemangiomas.             Workstation performed: [id number]                    Procedures  Procedures         ED Course  ED Course as of 09/13/23 1457   Wed Sep 13, 2023   1008 TT to GI, Farrah Otoole, will be down to see the patient   1020 BP 83/50, notified GI, 1 unit blood ordered   1033 Blood Pressure: 104/56             Medical Decision Making    This is a 50-year-old male present to the emergency department for evaluation of rectal bleeding and abdominal pain. Patient reports he began having rectal bleeding yesterday. Patient reports bright red blood per rectum and passing clots. Patient reports bleeding has not subsided continued to this morning. Patient reports the blood is running down his legs. Patient reports he has been unable to stop the bleeding. Patient denies any blood thinners. Patient reports associated lightheadedness. Patient denies having rectal bleeding in the past.  Patient is in no acute distress, tachycardic on initial exam.    Differential diagnosis to include but is not limited to: Diverticulitis, mass, hemorrhoids, colitis, proctitis    Initial ED Plan: labs, imaging    ED results:  Hemoglobin 9.0  Proctocolitis. Colonic diverticulosis. Stable large hepatic hemangiomas.  -Patient did become hypotensive, 1 unit of blood ordered at that time. Patient's blood pressure improved with completion of full liter of fluids and starting of blood. Upon AMA patient's blood pressure was 120/80.    -Discussed with Teresita from GI, recommending admission. This was discussed with the patient, states he needs to go to a  for his mother this afternoon but states he will come back this evening after the  is over. Discussed AMA with the patient. Patient is agreeable to stay for 1 unit of blood transfusion and then request to sign out AMA and return later this evening. Final ED assessment: Patient is guarded. Discussed radiologic studies and laboratory results. Requesting to leave AMA. AMA form discussed with the patient at length.   Patient signed form and states he will be back later this evening after his mother's .    Amount and/or Complexity of Data Reviewed  Labs: ordered. Radiology: ordered. Risk  Prescription drug management. Disposition  Final diagnoses:   Proctocolitis with rectal bleeding     Time reflects when diagnosis was documented in both MDM as applicable and the Disposition within this note     Time User Action Codes Description Comment    9/13/2023 11:29 AM Angelita Trevino [K52.9,  K62.5] Proctocolitis with rectal bleeding       ED Disposition     ED Disposition   AMA    Condition   --    Date/Time   Wed Sep 13, 2023 12:18 PM    Comment   Date: 9/13/2023  Patient: Jose Rivera  Admitted: [birth year]  8:17 AM  Attending Provider: Naomie Gonzalez DO    Jose Rivera or his authorized caregiver has made the decision for the patient to leave the emergency department against the advi ce of the emergency department staff and GI consultant. He or his authorized caregiver has been informed and understands the inherent risks, including death, worsening condition, permanent disability, hypotension, respiratory arrest, respiratory dist ress, cardiac injury, cardiac arrest.  He or his authorized caregiver has decided to accept the responsibility for this decision. Jose Rivera and all necessary parties have been advised that he may return for further evaluation or treatment. His c ondition at time of discharge was guarded.   Jose Rivera had current vital signs as follows:  /80   Pulse 90   Temp 97.8 °F (36.6 °C)   Resp 18   Wt 81.1 kg (178 lb 12.7 oz)            Follow-up Information     Follow up With Specialties Details Why Contact Info Additional Information    Sary Chiang MD  Call in 3 days For follow up [address] [phone]       [facility] Emergency Department Emergency Medicine Go to  If symptoms worsen [address] [facility] Emergency Department, [place], [place], [address] Discharge Medication List as of 9/13/2023 12:19 PM      CONTINUE these medications which have NOT CHANGED    Details   albuterol (2.5 mg/3 mL) 0.083 % nebulizer solution Take 2.5 mg by nebulization every 6 (six) hours as needed for wheezing or shortness of breath, Historical Med      atorvastatin (LIPITOR) 40 mg tablet Take 40 mg by mouth in the morning., Historical Med      Fluticasone-Salmeterol (Advair) 250-50 mcg/dose inhaler INHALE 1 PUFF BY MOUTH IN THE MORNING AND BEFORE BEDTIME, Historical Med      lisinopril (ZESTRIL) 10 mg tablet Take 10 mg by mouth daily, Historical Med      metoprolol tartrate (LOPRESSOR) 25 mg tablet Take 25 mg by mouth every 12 (twelve) hours, Historical Med      nitroglycerin (NITROSTAT) 0.3 mg SL tablet Place 0.3 mg under the tongue every 5 (five) minutes as needed for chest pain, Historical Med      ondansetron (ZOFRAN-ODT) 4 mg disintegrating tablet Take 1 tablet (4 mg total) by mouth every 8 (eight) hours as needed for nausea or vomiting, Starting u 10/20/2022, Print      oxyCODONE-acetaminophen (PERCOCET) 5-325 mg per tablet Take 1 tablet by mouth 2 (two) times a day as needed for moderate pain or severe pain Max Daily Amount: 2 tablets, Starting Wed 8/2/2023, Normal      pantoprazole (PROTONIX) 40 mg tablet Take 1 tablet (40 mg total) by mouth 2 (two) times a day, Starting Wed 8/2/2023, Until Fri 9/1/2023, Normal      Probiotic Product (Bacid) TABS Take 1 tablet by mouth in the morning and 1 tablet in the evening., Starting Tue 11/23/2021, Historical Med      tamsulosin (FLOMAX) 0.4 mg TAKE 2 CAPSULES BY MOUTH IN THE MORNING, Historical Med             No discharge procedures on file.     PDMP Review       Value Time User    PDMP Reviewed  Yes 5/17/2022  3:11 PM Tammy Estrada PA-C          ED Provider  Electronically Signed by           Chester Cramer PA-C  09/13/23 [id number]

## 2023-09-14 LAB
ABO GROUP BLD BPU: NORMAL
BPU ID: NORMAL
CROSSMATCH: NORMAL
UNIT DISPENSE STATUS: NORMAL
UNIT PRODUCT CODE: NORMAL
UNIT PRODUCT VOLUME: 350 ML
UNIT RH: NORMAL

## 2023-09-15 ENCOUNTER — OFFICE VISIT (OUTPATIENT)
Dept: URGENT CARE | Facility: CLINIC | Age: 63
End: 2023-09-15
Payer: COMMERCIAL

## 2023-09-15 VITALS
OXYGEN SATURATION: 96 % | RESPIRATION RATE: 20 BRPM | TEMPERATURE: 97.3 F | SYSTOLIC BLOOD PRESSURE: 122 MMHG | DIASTOLIC BLOOD PRESSURE: 78 MMHG | HEART RATE: 98 BPM

## 2023-09-15 DIAGNOSIS — S31.813A PUNCTURE WOUND OF RIGHT BUTTOCK, INITIAL ENCOUNTER: Primary | ICD-10-CM

## 2023-09-15 PROCEDURE — 99213 OFFICE O/P EST LOW 20 MIN: CPT | Performed by: PHYSICIAN ASSISTANT

## 2023-09-15 RX ORDER — SUCRALFATE 1 G/1
1 TABLET ORAL 4 TIMES DAILY
COMMUNITY

## 2023-09-15 NOTE — PROGRESS NOTES
[facility]        NAME: Sujata Nuñez is a 59 y.o. male  : 1960    MRN: [id number]  DATE: September 15, 2023  TIME: 7:59 PM      Assessment and Plan     Puncture wound of right buttock, initial encounter [S33.838B]  1. Puncture wound of right buttock, initial encounter          Note:   I do not think the patient was stung by a bee, rather was stuck by a thorn on a bush or sharp part of the branch. Patient is hemodynamically stable and is showing no signs of anaphylaxis/respiratory distress. Told patient to go to the ER with any worsening symptoms. Patient Instructions   There are no Patient Instructions on file for this visit. Follow up with PCP in 3-5 days. Go to ER if symptoms worsen. Chief Complaint     Chief Complaint   Patient presents with   • Allergic Reaction     Patient here after a bee sting and patient states he's allergic to bees. History of Present Illness     Patient presents with a possible bee sting and has a known allergy to bees. He states he backed into a bush and felt a sting on the right butt cheek. He does not have an epi-pen and did not self-administer any. He did not take anything for the sting. Denies SOB, angioedema, and chest tightness. Review of Systems     Review of Systems   Constitutional: Negative for chills, fatigue and fever. HENT: Negative for congestion, ear pain, postnasal drip, rhinorrhea, sinus pressure, sinus pain, sneezing and sore throat. Eyes: Negative for pain and visual disturbance. Respiratory: Negative for cough and shortness of breath. Cardiovascular: Negative for chest pain and palpitations. Gastrointestinal: Negative for abdominal pain, diarrhea, nausea and vomiting. Genitourinary: Negative for dysuria and hematuria. Musculoskeletal: Negative for arthralgias, back pain and myalgias. Skin: Positive for wound. Negative for rash.    Neurological: Negative for dizziness, seizures, syncope, numbness and headaches. All other systems reviewed and are negative.         Current Medications       Current Outpatient Medications:   •  albuterol (2.5 mg/3 mL) 0.083 % nebulizer solution, Take 2.5 mg by nebulization every 6 (six) hours as needed for wheezing or shortness of breath, Disp: , Rfl:   •  atorvastatin (LIPITOR) 40 mg tablet, Take 40 mg by mouth in the morning., Disp: , Rfl:   •  Fluticasone-Salmeterol (Advair) 250-50 mcg/dose inhaler, INHALE 1 PUFF BY MOUTH IN THE MORNING AND BEFORE BEDTIME, Disp: , Rfl:   •  lisinopril (ZESTRIL) 10 mg tablet, Take 10 mg by mouth daily, Disp: , Rfl:   •  metoprolol tartrate (LOPRESSOR) 25 mg tablet, Take 25 mg by mouth every 12 (twelve) hours, Disp: , Rfl:   •  nitroglycerin (NITROSTAT) 0.3 mg SL tablet, Place 0.3 mg under the tongue every 5 (five) minutes as needed for chest pain, Disp: , Rfl:   •  oxyCODONE-acetaminophen (PERCOCET) 5-325 mg per tablet, Take 1 tablet by mouth 2 (two) times a day as needed for moderate pain or severe pain Max Daily Amount: 2 tablets, Disp: 10 tablet, Rfl: 0  •  Probiotic Product (Bacid) TABS, Take 1 tablet by mouth in the morning and 1 tablet in the evening., Disp: , Rfl:   •  sucralfate (CARAFATE) 1 g tablet, Take 1 g by mouth 4 (four) times a day, Disp: , Rfl:   •  tamsulosin (FLOMAX) 0.4 mg, TAKE 2 CAPSULES BY MOUTH IN THE MORNING, Disp: , Rfl:   •  ondansetron (ZOFRAN-ODT) 4 mg disintegrating tablet, Take 1 tablet (4 mg total) by mouth every 8 (eight) hours as needed for nausea or vomiting (Patient not taking: Reported on 9/15/2023), Disp: 10 tablet, Rfl: 0  •  pantoprazole (PROTONIX) 40 mg tablet, Take 1 tablet (40 mg total) by mouth 2 (two) times a day, Disp: 60 tablet, Rfl: 0    Current Allergies     Allergies as of 09/15/2023 - Reviewed 09/15/2023   Allergen Reaction Noted   • Bee venom Angioedema 02/28/2023   • Iodine - food allergy Anaphylaxis 07/03/2019   • Penicillins Anaphylaxis 07/03/2019   • Ibuprofen GI Bleeding 06/01/2022 The following portions of the patient's history were reviewed and updated as appropriate: allergies, current medications, past family history, past medical history, past social history, past surgical history, and problem list.     Past Medical History:   Diagnosis Date   • Angina pectoris ([address])    • Asthma    • Cancer ([address])     liver   • Colon polyp    • Coronary artery disease    • Heart attack ([address])    • Hyperlipidemia    • Hypertension    • Liver disease    • Shortness of breath        Past Surgical History:   Procedure Laterality Date   • BACK SURGERY     • CHOLECYSTECTOMY     • COLONOSCOPY     • CORONARY ANGIOPLASTY WITH STENT PLACEMENT     • HERNIA REPAIR     • HERNIA REPAIR Left 5/17/2022    Procedure: laparoscopic attempted converted to open LEFT inguinal hernia repair with mesh;  Surgeon: Dennis Villavicencio MD;  Location: [facility];  Service: General   • INGUINAL HERNIA REPAIR Left 05/17/2022    laparoscopic attempted converted to open L inguinal hernia repair w/mesh, Dr. Sandra Dinero   • KNEE ARTHROSCOPY     • TONSILLECTOMY         History reviewed. No pertinent family history. Medications have been verified. Objective     /78   Pulse 98   Temp (!) 97.3 °F (36.3 °C)   Resp 20   SpO2 96%   No LMP for male patient. Physical Exam     Physical Exam  Vitals and nursing note reviewed. Constitutional:       Appearance: Normal appearance. He is normal weight. HENT:      Head: Normocephalic and atraumatic. Cardiovascular:      Rate and Rhythm: Normal rate and regular rhythm. Heart sounds: Normal heart sounds. Pulmonary:      Effort: Pulmonary effort is normal.      Breath sounds: Normal breath sounds. Skin:     General: Skin is warm and dry. Comments: Right lateral aspect of buttock, small puncture. No stinger, little erythema. No swelling. Neurological:      General: No focal deficit present.       Mental Status: He is alert and oriented to person, place, and time.    Psychiatric:         Mood and Affect: Mood normal.         Behavior: Behavior normal.

## 2023-10-12 ENCOUNTER — HOSPITAL ENCOUNTER (EMERGENCY)
Facility: HOSPITAL | Age: 63
Discharge: HOME | End: 2023-10-12
Attending: EMERGENCY MEDICINE
Payer: COMMERCIAL

## 2023-10-12 ENCOUNTER — APPOINTMENT (EMERGENCY)
Dept: CT IMAGING | Facility: HOSPITAL | Age: 63
End: 2023-10-12
Payer: COMMERCIAL

## 2023-10-12 VITALS
TEMPERATURE: 98.6 F | SYSTOLIC BLOOD PRESSURE: 157 MMHG | OXYGEN SATURATION: 97 % | HEART RATE: 98 BPM | RESPIRATION RATE: 17 BRPM | DIASTOLIC BLOOD PRESSURE: 89 MMHG

## 2023-10-12 DIAGNOSIS — K04.7 DENTAL ABSCESS: Primary | ICD-10-CM

## 2023-10-12 LAB
ALBUMIN SERPL BCP-MCNC: 4.4 G/DL (ref 3.5–5)
ALP SERPL-CCNC: 63 U/L (ref 34–104)
ALT SERPL W P-5'-P-CCNC: 11 U/L (ref 7–52)
ANION GAP SERPL CALCULATED.3IONS-SCNC: 7 MMOL/L
AST SERPL W P-5'-P-CCNC: 17 U/L (ref 13–39)
BASOPHILS # BLD AUTO: 0.03 THOUSANDS/ÂΜL (ref 0–0.1)
BASOPHILS NFR BLD AUTO: 0 % (ref 0–1)
BILIRUB SERPL-MCNC: 1.47 MG/DL (ref 0.2–1)
BUN SERPL-MCNC: 18 MG/DL (ref 5–25)
CALCIUM SERPL-MCNC: 9.2 MG/DL (ref 8.4–10.2)
CHLORIDE SERPL-SCNC: 100 MMOL/L (ref 96–108)
CO2 SERPL-SCNC: 28 MMOL/L (ref 21–32)
CREAT SERPL-MCNC: 0.78 MG/DL (ref 0.6–1.3)
EOSINOPHIL # BLD AUTO: 0.09 THOUSAND/ÂΜL (ref 0–0.61)
EOSINOPHIL NFR BLD AUTO: 1 % (ref 0–6)
ERYTHROCYTE [DISTWIDTH] IN BLOOD BY AUTOMATED COUNT: 21.2 % (ref 11.6–15.1)
GFR SERPL CREATININE-BSD FRML MDRD: 96 ML/MIN/1.73SQ M
GLUCOSE SERPL-MCNC: 126 MG/DL (ref 65–140)
HCT VFR BLD AUTO: 37.3 % (ref 36.5–49.3)
HGB BLD-MCNC: 11.4 G/DL (ref 12–17)
IMM GRANULOCYTES # BLD AUTO: 0.06 THOUSAND/UL (ref 0–0.2)
IMM GRANULOCYTES NFR BLD AUTO: 0 % (ref 0–2)
LYMPHOCYTES # BLD AUTO: 0.7 THOUSANDS/ÂΜL (ref 0.6–4.47)
LYMPHOCYTES NFR BLD AUTO: 5 % (ref 14–44)
MCH RBC QN AUTO: 19.3 PG (ref 26.8–34.3)
MCHC RBC AUTO-ENTMCNC: 30.6 G/DL (ref 31.4–37.4)
MCV RBC AUTO: 63 FL (ref 82–98)
MONOCYTES # BLD AUTO: 0.7 THOUSAND/ÂΜL (ref 0.17–1.22)
MONOCYTES NFR BLD AUTO: 5 % (ref 4–12)
NEUTROPHILS # BLD AUTO: 13.59 THOUSANDS/ÂΜL (ref 1.85–7.62)
NEUTS SEG NFR BLD AUTO: 89 % (ref 43–75)
NRBC BLD AUTO-RTO: 0 /100 WBCS
PLATELET # BLD AUTO: 301 THOUSANDS/UL (ref 149–390)
PMV BLD AUTO: 9.5 FL (ref 8.9–12.7)
POTASSIUM SERPL-SCNC: 3.7 MMOL/L (ref 3.5–5.3)
PROT SERPL-MCNC: 7.7 G/DL (ref 6.4–8.4)
RBC # BLD AUTO: 5.92 MILLION/UL (ref 3.88–5.62)
SODIUM SERPL-SCNC: 135 MMOL/L (ref 135–147)
WBC # BLD AUTO: 15.17 THOUSAND/UL (ref 4.31–10.16)

## 2023-10-12 PROCEDURE — 70491 CT SOFT TISSUE NECK W/DYE: CPT

## 2023-10-12 PROCEDURE — 85025 COMPLETE CBC W/AUTO DIFF WBC: CPT | Performed by: EMERGENCY MEDICINE

## 2023-10-12 PROCEDURE — 99284 EMERGENCY DEPT VISIT MOD MDM: CPT

## 2023-10-12 PROCEDURE — 99285 EMERGENCY DEPT VISIT HI MDM: CPT | Performed by: EMERGENCY MEDICINE

## 2023-10-12 PROCEDURE — 36415 COLL VENOUS BLD VENIPUNCTURE: CPT | Performed by: EMERGENCY MEDICINE

## 2023-10-12 PROCEDURE — 96375 TX/PRO/DX INJ NEW DRUG ADDON: CPT

## 2023-10-12 PROCEDURE — 80053 COMPREHEN METABOLIC PANEL: CPT | Performed by: EMERGENCY MEDICINE

## 2023-10-12 PROCEDURE — G1004 CDSM NDSC: HCPCS

## 2023-10-12 PROCEDURE — 96374 THER/PROPH/DIAG INJ IV PUSH: CPT

## 2023-10-12 RX ORDER — CLINDAMYCIN HYDROCHLORIDE 150 MG/1
450 CAPSULE ORAL ONCE
Status: COMPLETED | OUTPATIENT
Start: 2023-10-12 | End: 2023-10-12

## 2023-10-12 RX ORDER — MORPHINE SULFATE 4 MG/ML
4 INJECTION, SOLUTION INTRAMUSCULAR; INTRAVENOUS ONCE
Status: COMPLETED | OUTPATIENT
Start: 2023-10-12 | End: 2023-10-12

## 2023-10-12 RX ORDER — CLINDAMYCIN HYDROCHLORIDE 150 MG/1
450 CAPSULE ORAL EVERY 8 HOURS SCHEDULED
Qty: 90 CAPSULE | Refills: 0 | Status: SHIPPED | OUTPATIENT
Start: 2023-10-12 | End: 2023-10-22

## 2023-10-12 RX ORDER — DIPHENHYDRAMINE HYDROCHLORIDE 50 MG/ML
50 INJECTION INTRAMUSCULAR; INTRAVENOUS ONCE
Status: COMPLETED | OUTPATIENT
Start: 2023-10-12 | End: 2023-10-12

## 2023-10-12 RX ORDER — MORPHINE SULFATE 15 MG/1
15 TABLET ORAL EVERY 6 HOURS PRN
Qty: 7 TABLET | Refills: 0 | Status: SHIPPED | OUTPATIENT
Start: 2023-10-12 | End: 2023-10-19

## 2023-10-12 RX ADMIN — CLINDAMYCIN HYDROCHLORIDE 450 MG: 150 CAPSULE ORAL at 09:01

## 2023-10-12 RX ADMIN — DIPHENHYDRAMINE HYDROCHLORIDE 50 MG: 50 INJECTION, SOLUTION INTRAMUSCULAR; INTRAVENOUS at 09:01

## 2023-10-12 RX ADMIN — IOHEXOL 100 ML: 350 INJECTION, SOLUTION INTRAVENOUS at 10:05

## 2023-10-12 RX ADMIN — MORPHINE SULFATE 4 MG: 4 INJECTION INTRAVENOUS at 09:02

## 2023-10-12 NOTE — ED PROVIDER NOTES
History  Chief Complaint   Patient presents with   • Facial Swelling     Pt with right sided facial swelling that started yesterday, unsure if its an infected tooth or abscess     Patient is 78-year-old male past medical history of right-sided facial swelling beginning yesterday. He notes constant pain radiating into his eye and ear which is stabbing in nature since yesterday and swelling to the face. Denies any purulent drainage, shortness of breath, dysphagia. Notes nausea and subjective fevers. Has been taking Aleve with no relief. Denies any trauma to the area. Denies any chest pain, dizziness. Prior to Admission Medications   Prescriptions Last Dose Informant Patient Reported? Taking? Fluticasone-Salmeterol (Advair) 250-50 mcg/dose inhaler   Yes No   Sig: INHALE 1 PUFF BY MOUTH IN THE MORNING AND BEFORE BEDTIME   Probiotic Product (Bacid) TABS  Self Yes No   Sig: Take 1 tablet by mouth in the morning and 1 tablet in the evening. albuterol (2.5 mg/3 mL) 0.083 % nebulizer solution  Self Yes No   Sig: Take 2.5 mg by nebulization every 6 (six) hours as needed for wheezing or shortness of breath   atorvastatin (LIPITOR) 40 mg tablet  Self Yes No   Sig: Take 40 mg by mouth in the morning.    lisinopril (ZESTRIL) 10 mg tablet   Yes No   Sig: Take 10 mg by mouth daily   metoprolol tartrate (LOPRESSOR) 25 mg tablet   Yes No   Sig: Take 25 mg by mouth every 12 (twelve) hours   nitroglycerin (NITROSTAT) 0.3 mg SL tablet  Self Yes No   Sig: Place 0.3 mg under the tongue every 5 (five) minutes as needed for chest pain   ondansetron (ZOFRAN-ODT) 4 mg disintegrating tablet   No No   Sig: Take 1 tablet (4 mg total) by mouth every 8 (eight) hours as needed for nausea or vomiting   Patient not taking: Reported on 9/15/2023   oxyCODONE-acetaminophen (PERCOCET) 5-325 mg per tablet   No No   Sig: Take 1 tablet by mouth 2 (two) times a day as needed for moderate pain or severe pain Max Daily Amount: 2 tablets pantoprazole (PROTONIX) 40 mg tablet   No No   Sig: Take 1 tablet (40 mg total) by mouth 2 (two) times a day   sucralfate (CARAFATE) 1 g tablet  Self Yes No   Sig: Take 1 g by mouth 4 (four) times a day   tamsulosin (FLOMAX) 0.4 mg   Yes No   Sig: TAKE 2 CAPSULES BY MOUTH IN THE MORNING      Facility-Administered Medications: None       Past Medical History:   Diagnosis Date   • Angina pectoris (HCC)    • Asthma    • Cancer ([address] [facility])     liver   • Colon polyp    • Coronary artery disease    • Heart attack ([address])    • Hyperlipidemia    • Hypertension    • Liver disease    • Shortness of breath        Past Surgical History:   Procedure Laterality Date   • BACK SURGERY     • CHOLECYSTECTOMY     • COLONOSCOPY     • CORONARY ANGIOPLASTY WITH STENT PLACEMENT     • HERNIA REPAIR     • HERNIA REPAIR Left 5/17/2022    Procedure: laparoscopic attempted converted to open LEFT inguinal hernia repair with mesh;  Surgeon: Onelia Elliott MD;  Location: [facility] [place] OR;  Service: General   • INGUINAL HERNIA REPAIR Left 05/17/2022    laparoscopic attempted converted to open L inguinal hernia repair w/mesh, Dr. Lieutenant Herr   • KNEE ARTHROSCOPY     • TONSILLECTOMY         History reviewed. No pertinent family history. I have reviewed and agree with the history as documented. E-Cigarette/Vaping   • E-Cigarette Use Never User      E-Cigarette/Vaping Substances   • Nicotine No    • THC No    • CBD No    • Flavoring No    • Other No    • Unknown No      Social History     Tobacco Use   • Smoking status: Never   • Smokeless tobacco: Never   Vaping Use   • Vaping Use: Never used   Substance Use Topics   • Alcohol use: Never   • Drug use: Never       Review of Systems   All other systems reviewed and are negative. Physical Exam  Physical Exam  Vitals reviewed. Constitutional:       General: He is not in acute distress. Appearance: Normal appearance. He is not ill-appearing.    HENT:      Mouth/Throat:      Mouth: Mucous membranes are moist.      Comments: Large edema without erythema to the right lower jaw with edema to the adjacent gingiva, no purulent drainage, tenderness to palpation of multiple teeth to the right lower jaw, no sublingual edema  Eyes:      Conjunctiva/sclera: Conjunctivae normal.   Cardiovascular:      Rate and Rhythm: Normal rate and regular rhythm. Pulmonary:      Effort: Pulmonary effort is normal.   Musculoskeletal:         General: No swelling. Normal range of motion. Cervical back: Neck supple. Skin:     General: Skin is warm and dry. Neurological:      General: No focal deficit present. Mental Status: He is alert. Psychiatric:         Mood and Affect: Mood normal.         Vital Signs  ED Triage Vitals [10/12/23 0808]   Temperature Pulse Respirations Blood Pressure SpO2   98.6 °F (37 °C) 103 18 146/92 96 %      Temp Source Heart Rate Source Patient Position - Orthostatic VS BP Location FiO2 (%)   Oral Monitor Lying Right arm --      Pain Score       9           Vitals:    10/12/23 0808   BP: 146/92   Pulse: 103   Patient Position - Orthostatic VS: Lying         Visual Acuity      ED Medications  Medications   diphenhydrAMINE (BENADRYL) injection 50 mg (has no administration in time range)   morphine injection 4 mg (has no administration in time range)   clindamycin (CLEOCIN) capsule 450 mg (has no administration in time range)       Diagnostic Studies  Results Reviewed       None                   No orders to display              Procedures  Procedures         ED Course  ED Course as of 10/12/23 1423   u Oct 12, 2023   1048 Patient with dental abscess and cellulitis, will attempt drainage and discharge with outpatient antibiotics and oral surgery follow-up. 1122 Attempted drainage, large blood, no purulent drainage, discussed return precautions outpatient oral surgery follow-up and will discharge on antibiotics. Wife and patient state they understand. Medical Decision Making  Patient is 61-year-old male past medical history of COPD, diverticulitis, hypertension, liver cancer, CAD, anemia presenting with dental pain. Patient is well-appearing at bedside with stable vitals and in no acute distress. He does have low-grade tachycardia likely secondary to pain as he has large discomfort to the external and intraoral right lower jaw, tender to palpation of the teeth with large induration, no fluctuance, no purulent drainage. Suspect dental abscess however will obtain CT to assess for extension into the sinuses or neck, give antibiotics, pain control, and obtain labs to assess for kidney injury in the setting of increased naproxen use. Amount and/or Complexity of Data Reviewed  Labs: ordered. Radiology: ordered. Risk  Prescription drug management. Disposition  Final diagnoses:   None     ED Disposition       None          Follow-up Information    None         Patient's Medications   Discharge Prescriptions    No medications on file       No discharge procedures on file.     PDMP Review         Value Time User    PDMP Reviewed  Yes 5/17/2022  3:11 PM Jessika Dennison PA-C            ED Provider  Electronically Signed by             Carmen Ley DO  10/12/23 2199

## 2023-11-05 ENCOUNTER — APPOINTMENT (EMERGENCY)
Dept: CT IMAGING | Facility: HOSPITAL | Age: 63
End: 2023-11-05
Payer: COMMERCIAL

## 2023-11-05 ENCOUNTER — HOSPITAL ENCOUNTER (EMERGENCY)
Facility: HOSPITAL | Age: 63
Discharge: HOME | End: 2023-11-05
Attending: EMERGENCY MEDICINE
Payer: COMMERCIAL

## 2023-11-05 VITALS
DIASTOLIC BLOOD PRESSURE: 59 MMHG | HEART RATE: 77 BPM | RESPIRATION RATE: 18 BRPM | OXYGEN SATURATION: 98 % | SYSTOLIC BLOOD PRESSURE: 107 MMHG | TEMPERATURE: 98.4 F

## 2023-11-05 DIAGNOSIS — M54.50 ACUTE LOW BACK PAIN: Primary | ICD-10-CM

## 2023-11-05 LAB
ATRIAL RATE: 83 BPM
P AXIS: 49 DEGREES
PR INTERVAL: 180 MS
QRS AXIS: 63 DEGREES
QRSD INTERVAL: 90 MS
QT INTERVAL: 368 MS
QTC INTERVAL: 432 MS
T WAVE AXIS: 57 DEGREES
VENTRICULAR RATE: 83 BPM

## 2023-11-05 PROCEDURE — 93005 ELECTROCARDIOGRAM TRACING: CPT

## 2023-11-05 PROCEDURE — 96374 THER/PROPH/DIAG INJ IV PUSH: CPT

## 2023-11-05 PROCEDURE — 93010 ELECTROCARDIOGRAM REPORT: CPT | Performed by: INTERNAL MEDICINE

## 2023-11-05 PROCEDURE — 99283 EMERGENCY DEPT VISIT LOW MDM: CPT

## 2023-11-05 PROCEDURE — 72131 CT LUMBAR SPINE W/O DYE: CPT

## 2023-11-05 PROCEDURE — 99285 EMERGENCY DEPT VISIT HI MDM: CPT | Performed by: PHYSICIAN ASSISTANT

## 2023-11-05 RX ORDER — HYDROMORPHONE HCL/PF 1 MG/ML
1 SYRINGE (ML) INJECTION ONCE
Status: COMPLETED | OUTPATIENT
Start: 2023-11-05 | End: 2023-11-05

## 2023-11-05 RX ORDER — METHOCARBAMOL 750 MG/1
750 TABLET, FILM COATED ORAL EVERY 8 HOURS PRN
Qty: 15 TABLET | Refills: 0 | Status: SHIPPED | OUTPATIENT
Start: 2023-11-05

## 2023-11-05 RX ADMIN — HYDROMORPHONE HYDROCHLORIDE 1 MG: 1 INJECTION, SOLUTION INTRAMUSCULAR; INTRAVENOUS; SUBCUTANEOUS at 15:27

## 2023-11-05 NOTE — ED NOTES
Patient transported to [address] [place] [address] [address], [address]  11/05/23 2674

## 2023-11-05 NOTE — ED PROVIDER NOTES
History  Chief Complaint   Patient presents with    Back Pain     Pt was lifting heavy bags yesterday and laid down on the couch around 5pm yesterday and hasn't gotten up since then. Pt states he heard a pop and the last this happened he shattered a disc in his back. Pt complains of pain radiating from his left lower back down to his hip, leg and testicle. Pt states his left foot is numb. Pt has +2 pulses in both feet     58 yo with acute low back pain. Has prior h/o of lower back pain but has been doing well since he had lumbar surgery in 2016. Yesterday patient was putting groceries in the back of his car. He didn't feel any injury at the time but after he went home and laid down he has not been able to get up due to pain. Has paresthesias in his lower legs (left worse than right) but no anesthesia. Denies saddle anesthesia. No incontinence. No fever or chills. H/o liver cancer treated with radiation. No chronic steroid use. No IVDA. History provided by:  Patient   used: No    Back Pain  Associated symptoms: no abdominal pain, no chest pain, no dysuria and no fever        Prior to Admission Medications   Prescriptions Last Dose Informant Patient Reported? Taking? Fluticasone-Salmeterol (Advair) 250-50 mcg/dose inhaler   Yes No   Sig: INHALE 1 PUFF BY MOUTH IN THE MORNING AND BEFORE BEDTIME   Probiotic Product (Bacid) TABS  Self Yes No   Sig: Take 1 tablet by mouth in the morning and 1 tablet in the evening. albuterol (2.5 mg/3 mL) 0.083 % nebulizer solution  Self Yes No   Sig: Take 2.5 mg by nebulization every 6 (six) hours as needed for wheezing or shortness of breath   atorvastatin (LIPITOR) 40 mg tablet  Self Yes No   Sig: Take 40 mg by mouth in the morning.    lisinopril (ZESTRIL) 10 mg tablet   Yes No   Sig: Take 10 mg by mouth daily   metoprolol tartrate (LOPRESSOR) 25 mg tablet   Yes No   Sig: Take 25 mg by mouth every 12 (twelve) hours   nitroglycerin (NITROSTAT) 0.3 mg SL tablet  Self Yes No   Sig: Place 0.3 mg under the tongue every 5 (five) minutes as needed for chest pain   ondansetron (ZOFRAN-ODT) 4 mg disintegrating tablet   No No   Sig: Take 1 tablet (4 mg total) by mouth every 8 (eight) hours as needed for nausea or vomiting   Patient not taking: Reported on 9/15/2023   oxyCODONE-acetaminophen (PERCOCET) 5-325 mg per tablet   No No   Sig: Take 1 tablet by mouth 2 (two) times a day as needed for moderate pain or severe pain Max Daily Amount: 2 tablets   pantoprazole (PROTONIX) 40 mg tablet   No No   Sig: Take 1 tablet (40 mg total) by mouth 2 (two) times a day   sucralfate (CARAFATE) 1 g tablet  Self Yes No   Sig: Take 1 g by mouth 4 (four) times a day   tamsulosin (FLOMAX) 0.4 mg   Yes No   Sig: TAKE 2 CAPSULES BY MOUTH IN THE MORNING      Facility-Administered Medications: None       Past Medical History:   Diagnosis Date    Angina pectoris (HCC)     Asthma     Cancer ([address])     liver    Colon polyp     Coronary artery disease     Heart attack (HCC)     Hyperlipidemia     Hypertension     Liver disease     Shortness of breath        Past Surgical History:   Procedure Laterality Date    BACK SURGERY      CHOLECYSTECTOMY      COLONOSCOPY      CORONARY ANGIOPLASTY WITH STENT PLACEMENT      HERNIA REPAIR      HERNIA REPAIR Left 5/17/2022    Procedure: laparoscopic attempted converted to open LEFT inguinal hernia repair with mesh;  Surgeon: Janessa Villatoro MD;  Location: [facility];  Service: General    INGUINAL HERNIA REPAIR Left 05/17/2022    laparoscopic attempted converted to open L inguinal hernia repair w/mesh, Dr. Marcelo Hall ARTHROSCOPY      TONSILLECTOMY         History reviewed. No pertinent family history. I have reviewed and agree with the history as documented.     E-Cigarette/Vaping    E-Cigarette Use Never User      E-Cigarette/Vaping Substances    Nicotine No     THC No     CBD No     Flavoring No     Other No     Unknown No      Social History Tobacco Use    Smoking status: Never    Smokeless tobacco: Never   Vaping Use    Vaping Use: Never used   Substance Use Topics    Alcohol use: Never    Drug use: Never       Review of Systems   Constitutional:  Negative for chills and fever. HENT:  Negative for ear pain and sore throat. Eyes:  Negative for pain and visual disturbance. Respiratory:  Negative for cough and shortness of breath. Cardiovascular:  Negative for chest pain and palpitations. Gastrointestinal:  Negative for abdominal pain and vomiting. Genitourinary:  Negative for dysuria and hematuria. Musculoskeletal:  Positive for back pain. Negative for arthralgias. Skin:  Negative for color change and rash. Neurological:  Negative for seizures and syncope. All other systems reviewed and are negative. Physical Exam  Physical Exam  Vitals and nursing note reviewed. Constitutional:       General: He is not in acute distress. Appearance: He is well-developed. HENT:      Head: Normocephalic and atraumatic. Eyes:      Conjunctiva/sclera: Conjunctivae normal.   Cardiovascular:      Rate and Rhythm: Normal rate and regular rhythm. Pulses:           Dorsalis pedis pulses are 2+ on the right side and 2+ on the left side. Heart sounds: No murmur heard. Pulmonary:      Effort: Pulmonary effort is normal. No respiratory distress. Breath sounds: Normal breath sounds. Abdominal:      Palpations: Abdomen is soft. Tenderness: There is no abdominal tenderness. Musculoskeletal:         General: No swelling. Cervical back: Normal and neck supple. Thoracic back: Normal.      Lumbar back: Spasms, tenderness and bony tenderness present. No swelling, edema, deformity, signs of trauma or lacerations. Normal range of motion. Positive left straight leg raise test. No scoliosis. Back:    Skin:     General: Skin is warm and dry. Capillary Refill: Capillary refill takes less than 2 seconds. Neurological:      Mental Status: He is alert and oriented to person, place, and time. GCS: GCS eye subscore is 4. GCS verbal subscore is 5. GCS motor subscore is 6. Deep Tendon Reflexes:      Reflex Scores:       Patellar reflexes are 2+ on the right side and 2+ on the left side. Comments: GCS 15. AAOx3. CN II-XII grossly intact. No focal neuro deficits. Psychiatric:         Mood and Affect: Mood normal.         Vital Signs  ED Triage Vitals [11/05/23 1508]   Temperature Pulse Respirations Blood Pressure SpO2   98.4 °F (36.9 °C) 77 18 107/59 98 %      Temp Source Heart Rate Source Patient Position - Orthostatic VS BP Location FiO2 (%)   Oral Monitor Sitting Left arm --      Pain Score       10 - Worst Possible Pain           Vitals:    11/05/23 1508   BP: 107/59   Pulse: 77   Patient Position - Orthostatic VS: Sitting         Visual Acuity      ED Medications  Medications   HYDROmorphone (DILAUDID) injection 1 mg (1 mg Intravenous Given 11/5/23 1527)       Diagnostic Studies  Results Reviewed       None                   CT spine lumbar without contrast   Final Result by Melinda Barrios MD (11/05 1706)      No acute fractures identified of the lumbar spine. Degenerative changes, as described above. Workstation performed: [id number]                    Procedures  Procedures         ED Course                               SBIRT 22yo+      Flowsheet Row Most Recent Value   Initial Alcohol Screen: US AUDIT-C     1. How often do you have a drink containing alcohol? 0 Filed at: 11/05/2023 1508   2. How many drinks containing alcohol do you have on a typical day you are drinking? 0 Filed at: 11/05/2023 1508   3a. Male UNDER 65: How often do you have five or more drinks on one occasion? 0 Filed at: 11/05/2023 1508   Audit-C Score 0 Filed at: 11/05/2023 1508   EVAN: How many times in the past year have you. .. Used an illegal drug or used a prescription medication for non-medical reasons?  Never [facility] at: 11/05/2023 1508                      Medical Decision Making  DDx including but not limited to: sciatica, herniated disc, arthritis, spinal stenosis, strain, sprain, fracture, cauda equina syndrome, epidural abscess, transverse myelitis, AAA. Plan: CT lumbar spine. Analgesia. Dispo pending. MDM: 58 yo with back pain. CT without acute abnormalities. No red flag signs on exam. Likely DDD, lumbar radiculopathy, disc herniation. Feels better now. Ambulating in department. Recommended close outpatient f/u. Return parameters provided. Pt understands and agrees with plan. Amount and/or Complexity of Data Reviewed  Radiology: ordered. Risk  Prescription drug management. Disposition  Final diagnoses:   Acute low back pain     Time reflects when diagnosis was documented in both MDM as applicable and the Disposition within this note       Time User Action Codes Description Comment    11/5/2023  5:33 PM Barbie Trevino [M54.50] Acute low back pain           ED Disposition       ED Disposition   Discharge    Condition   Stable    Date/Time   Sun Nov 5, 2023 Altagracia discharge to home/self care.                    Follow-up Information       Follow up With Specialties Details Why Contact Info    Natasha Colon MD    [address] [facility]  [phone]              Discharge Medication List as of 11/5/2023  5:33 PM        START taking these medications    Details   methocarbamol (Robaxin-750) 750 mg tablet Take 1 tablet (750 mg total) by mouth every 8 (eight) hours as needed for muscle spasms, Starting Sun 11/5/2023, Print           CONTINUE these medications which have NOT CHANGED    Details   albuterol (2.5 mg/3 mL) 0.083 % nebulizer solution Take 2.5 mg by nebulization every 6 (six) hours as needed for wheezing or shortness of breath, Historical Med      atorvastatin (LIPITOR) 40 mg tablet Take 40 mg by mouth in the morning., Historical Med Fluticasone-Salmeterol (Advair) 250-50 mcg/dose inhaler INHALE 1 PUFF BY MOUTH IN THE MORNING AND BEFORE BEDTIME, Historical Med      lisinopril (ZESTRIL) 10 mg tablet Take 10 mg by mouth daily, Historical Med      metoprolol tartrate (LOPRESSOR) 25 mg tablet Take 25 mg by mouth every 12 (twelve) hours, Historical Med      nitroglycerin (NITROSTAT) 0.3 mg SL tablet Place 0.3 mg under the tongue every 5 (five) minutes as needed for chest pain, Historical Med      ondansetron (ZOFRAN-ODT) 4 mg disintegrating tablet Take 1 tablet (4 mg total) by mouth every 8 (eight) hours as needed for nausea or vomiting, Starting u 10/20/2022, Print      oxyCODONE-acetaminophen (PERCOCET) 5-325 mg per tablet Take 1 tablet by mouth 2 (two) times a day as needed for moderate pain or severe pain Max Daily Amount: 2 tablets, Starting Wed 8/2/2023, Normal      pantoprazole (PROTONIX) 40 mg tablet Take 1 tablet (40 mg total) by mouth 2 (two) times a day, Starting Wed 8/2/2023, Until Fri 9/1/2023, Normal      Probiotic Product (Bacid) TABS Take 1 tablet by mouth in the morning and 1 tablet in the evening., Starting Tue 11/23/2021, Historical Med      sucralfate (CARAFATE) 1 g tablet Take 1 g by mouth 4 (four) times a day, Historical Med      tamsulosin (FLOMAX) 0.4 mg TAKE 2 CAPSULES BY MOUTH IN THE MORNING, Historical Med             No discharge procedures on file.     PDMP Review         Value Time User    PDMP Reviewed  Yes 5/17/2022  3:11 PM Jillian Gilmore PA-C            ED Provider  Electronically Signed by             Evelin Lennon PA-C  11/09/23 4119

## 2024-04-10 ENCOUNTER — TELEPHONE (OUTPATIENT)
Dept: PALLIATIVE MEDICINE | Facility: CLINIC | Age: 64
End: 2024-04-10

## 2024-04-11 NOTE — TELEPHONE ENCOUNTER
Left patient a message canceling 04/25/2024 appointment due to not being Palliative Care appropriate . Will try again to reach patient .   
Left patient a message requesting a call back due to not being palliative care appropriate .   
Pts sister returned call , patient is currently incarcerated , explained he was not appropriate for [facility] . She is aware and okay with this , appointment cancelled .   
(4) no limitation

## 2024-04-25 ENCOUNTER — TELEPHONE (OUTPATIENT)
Dept: PALLIATIVE MEDICINE | Facility: CLINIC | Age: 64
End: 2024-04-25

## 2024-04-25 NOTE — TELEPHONE ENCOUNTER
Spoke to nurse at correctional facility . They were requesting a appointment for patient. Informed nurse that patient was not Palliative appropriate per April. Also nurse stated she reached out to oncology for his records. I informed her per April there are not any oncology records because he was never seen by [facility] oncology.

## 2024-11-26 NOTE — OCCUPATIONAL THERAPY NOTE
Occupational Therapy Evaluation        Patient Name: Starr Guo  EOBBF'O Date: 5/69/[birth year]11 05/19/22 0936   OT Last Visit   OT Visit Date 05/19/22   Note Type   Note type Evaluation   Restrictions/Precautions   Weight Bearing Precautions Per Order No   Braces or Orthoses Other (Comment)  (none at baseline)   Other Precautions Bed Alarm;O2;Fall Risk;Pain  (right hearing aid/ Home O2 at 3 L)   Pain Assessment   Pain Assessment Tool 0-10   Pain Score 5   Pain Location/Orientation Location: Abdomen   Pain Onset/Description Onset: Ongoing   Patient's Stated Pain Goal No pain   Home Living   Type of Home House  (ranch)   Home Layout One level; Laundry in basement  (1 SAMANTHA, 1 flight to baseAlphat)   Bathroom Shower/Tub Tub/shower unit   [employer] Grab bars in shower; Shower chair;Hand-held shower   [address] [place] [address]; Other (Comment)   Additional Comments ambulates with cane   Prior Function   Level of [place] Independent with ADLs and functional mobility   Lives With Family  (mother)   Receives Help From Family   ADL Assistance Independent   IADLs Independent   Falls in the last 6 months 1 to 4  (2 falls, 1 recent fall PTA)   Vocational Other (Comment)  (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)   Comments patient does not drive   Lifestyle   Autonomy Patient reported independnet with ADLs/ IADLs, requiring increased assistance post recent surgery  At baseline ambulates with a cane, does not drive and is currently unemployed  Patient lives with his mother in a one story house, 1 SAMANTHA (threshold step) with laundry in the basemant     Reciprocal Relationships Supportive family   Service to Others previously was the caregiver for his mother   Intrinsic Gratification "I enjoy reading"   Psychosocial   Psychosocial (WDL) WDL   ADL   Eating Assistance 6  Modified independent   Grooming Assistance 6  Modified Independent   UB Bathing Assistance 5  Supervision/Setup   LB Bathing Assistance 5  Supervision/Setup   UB Dressing Assistance 5  Supervision/Setup   LB Dressing Assistance 4  Minimal Assistance   Toileting Assistance  5  Supervision/Setup   Functional Assistance 5  Supervision/Setup   Bed Mobility   Supine to Sit 5  Supervision   Additional items Assist x 1; Increased time required;HOB elevated;Verbal cues   Sit to Supine 5  Supervision   Additional items Assist x 1; Increased time required;HOB elevated;Verbal cues   Additional Comments educated patient on log rolling technique, patient provided return demonstration correctly and verbalized understanding  Transfers   Sit to Stand 5  Supervision   Additional items Increased time required   Stand to Sit 5  Supervision   Additional items Assist x 1; Increased time required;Verbal cues   Functional Mobility   Functional Mobility 5  Supervision   Additional Comments "i feel steady with the walker"   Additional items Rolling walker   Balance   Static Sitting Good   Dynamic Sitting Fair +   Static Standing Fair   Dynamic Standing Fair   Activity Tolerance   Activity Tolerance Patient limited by fatigue   Medical Staff Made Aware Communicated with KATHLEEN [facility], therapy outcomes as well as ED provider  RUE Assessment   RUE Assessment WFL   LUE Assessment   LUE Assessment WFL   Hand Function   Gross Motor Coordination Functional   Fine Motor Coordination Functional   Sensation   Light Touch No apparent deficits  (BUEs)   Vision-Basic Assessment   Current Vision Does not wear glasses   Cognition   Overall Cognitive Status WFL   Arousal/Participation Alert; Responsive; Cooperative   Attention Within functional limits   Orientation Level Oriented X4   Memory Within functional limits   Following Commands Follows all commands and directions without difficulty   Assessment   Limitation Decreased ADL status; Decreased UE strength;Decreased Safe judgement during ADL;Decreased endurance;Decreased self-care No trans;Decreased high-level ADLs   Prognosis Good   Assessment Patient is a 62 y o  male seen for OT evaluation s/p admit to [id number] on 5/19/2022 w/<principal problem not specified>  Commorbidities affecting patient's functional performance at time of assessment include:diverticulitis, COPD, HTN, liver cancer, coronary artery disease, iron deficiency anemia  Orders placed for OT evaluation and treatment  Performed at least two patient identifiers during session including name and wristband  Prior to admission, Patient reported independnet with ADLs/ IADLs, requiring increased assistance post recent surgery  At baseline ambulates with a cane, does not drive and is currently unemployed  Patient lives with his mother in a one story house, 1 [address] (threshold step) with laundry in the basemant  Personal factors affecting patient at time of initial evaluation include: limited caregiver support, decreased initiation and engagement and difficulty performing ADLs  Upon evaluation, patient requires modified independent assist for UB ADLs, minimal  assist for LB ADLs  Occupational performance is affected by the following deficits: dynamic sit/ stand balance deficit with poor standing tolerance time for self care and functional mobility, decreased activity tolerance and increased pain  Patient to benefit from continued Occupational Therapy treatment while in the hospital to address deficits as defined above and maximize level of functional independence with ADLs and functional mobility  Occupational Performance areas to address include: bathing/ shower, dressing, transfer to all surfaces, functional mobility, emergency response, health maintenance, IADLs: safety procedures and Leisure Participation  From OT standpoint, recommendation at time of d/c would be Home with family support, [address] and Home OT  Plan   Treatment Interventions ADL retraining; Endurance training;Patient/family training;Equipment evaluation/education; Compensatory technique education;Continued evaluation; Energy conservation; Activityengagement   Goal Expiration Date 05/26/22   OT Frequency 2-3x/wk   Recommendation   OT Discharge Recommendation Home with home health rehabilitation   AM-PAC Daily Activity Inpatient   Lower Body Dressing 2   Bathing 3   Toileting 3   Upper Body Dressing 3   Grooming 4   Eating 4   Daily Activity Raw Score 19   Daily Activity Standardized Score (Calc for Raw Score >=11) 40 22   AM-PAC Applied Cognition Inpatient   Following a Speech/Presentation 4   Understanding Ordinary Conversation 4   Taking Medications 4   Remembering Where Things Are Placed or Put Away 4   Remembering List of 4-5 Errands 4   Taking Care of Complicated Tasks 4   Applied Cognition Raw Score 24   Applied Cognition Standardized Score 62 21   Barthel Index   Feeding 10   Bathing 0   Grooming Score 5   Dressing Score 5   Bladder Score 10   Bowels Score 10   Toilet Use Score 5   Transfers (Bed/Chair) Score 10   Mobility (Level Surface) Score 0   Stairs Score 0   Barthel Index Score 55       1 - Patient will verbalize and demonstrate use of energy conservation/ deep breathing technique and work simplification skills during functional activity with no verbal cues  2 - Patient will verbalize and demonstrate good body mechanics and joint protection techniques during  ADLs/ IADLs with no verbal cues  3 - Patient will increase OOB/ sitting tolerance to 2-4 hours per day for increased participation in self care and leisure tasks with no s/s of exertion  4 - Patient will increase standing tolerance time to 5  minutes with unilateral UE support to complete sink level ADLs@ mod I level  5 - Patient will increase sitting tolerance at edge of bed to 20 minutes to complete UB ADLs @ set up assist level  6 - Patient will transfer bed to Chair / toilet at Set up assist level with AD as indicated       7 - Patient will complete UB ADLs with set up assist      8 - Patient will complete LB ADLs with min assist with the use of adaptive equipment       9 - Patient will complete toileting hygiene with set up assist/ supervision for thoroughness    10 - Patient/ Family  will demonstrate competency with UE Home Exercise Program

## 2024-12-12 ENCOUNTER — APPOINTMENT (EMERGENCY)
Dept: RADIOLOGY | Facility: HOSPITAL | Age: 64
End: 2024-12-12
Payer: OTHER GOVERNMENT

## 2024-12-12 ENCOUNTER — HOSPITAL ENCOUNTER (EMERGENCY)
Facility: HOSPITAL | Age: 64
Discharge: HOME | End: 2024-12-12
Attending: EMERGENCY MEDICINE
Payer: OTHER GOVERNMENT

## 2024-12-12 VITALS
BODY MASS INDEX: 30.52 KG/M2 | SYSTOLIC BLOOD PRESSURE: 107 MMHG | HEART RATE: 69 BPM | DIASTOLIC BLOOD PRESSURE: 79 MMHG | OXYGEN SATURATION: 92 % | RESPIRATION RATE: 20 BRPM | TEMPERATURE: 98.8 F | WEIGHT: 194.89 LBS

## 2024-12-12 DIAGNOSIS — R07.9 CHEST PAIN: Primary | ICD-10-CM

## 2024-12-12 DIAGNOSIS — R06.02 SOB (SHORTNESS OF BREATH): ICD-10-CM

## 2024-12-12 LAB
ALBUMIN SERPL BCG-MCNC: 4.5 G/DL (ref 3.5–5)
ALP SERPL-CCNC: 52 U/L (ref 34–104)
ALT SERPL W P-5'-P-CCNC: 12 U/L (ref 7–52)
ANION GAP SERPL CALCULATED.3IONS-SCNC: 5 MMOL/L (ref 4–13)
AST SERPL W P-5'-P-CCNC: 18 U/L (ref 13–39)
BASOPHILS # BLD AUTO: 0.05 THOUSANDS/ÂΜL (ref 0–0.1)
BASOPHILS NFR BLD AUTO: 1 % (ref 0–1)
BILIRUB SERPL-MCNC: 1.21 MG/DL (ref 0.2–1)
BUN SERPL-MCNC: 19 MG/DL (ref 5–25)
CALCIUM SERPL-MCNC: 9.5 MG/DL (ref 8.4–10.2)
CARDIAC TROPONIN I PNL SERPL HS: 3 NG/L (ref ?–50)
CHLORIDE SERPL-SCNC: 104 MMOL/L (ref 96–108)
CO2 SERPL-SCNC: 27 MMOL/L (ref 21–32)
CREAT SERPL-MCNC: 0.93 MG/DL (ref 0.6–1.3)
EOSINOPHIL # BLD AUTO: 0.37 THOUSAND/ÂΜL (ref 0–0.61)
EOSINOPHIL NFR BLD AUTO: 4 % (ref 0–6)
ERYTHROCYTE [DISTWIDTH] IN BLOOD BY AUTOMATED COUNT: 19.3 % (ref 11.6–15.1)
GFR SERPL CREATININE-BSD FRML MDRD: 87 ML/MIN/1.73SQ M
GLUCOSE SERPL-MCNC: 84 MG/DL (ref 65–140)
HCT VFR BLD AUTO: 39.7 % (ref 36.5–49.3)
HGB BLD-MCNC: 12 G/DL (ref 12–17)
IMM GRANULOCYTES # BLD AUTO: 0.18 THOUSAND/UL (ref 0–0.2)
IMM GRANULOCYTES NFR BLD AUTO: 2 % (ref 0–2)
LIPASE SERPL-CCNC: 9 U/L (ref 11–82)
LYMPHOCYTES # BLD AUTO: 1.89 THOUSANDS/ÂΜL (ref 0.6–4.47)
LYMPHOCYTES NFR BLD AUTO: 20 % (ref 14–44)
MCH RBC QN AUTO: 18.8 PG (ref 26.8–34.3)
MCHC RBC AUTO-ENTMCNC: 30.2 G/DL (ref 31.4–37.4)
MCV RBC AUTO: 62 FL (ref 82–98)
MONOCYTES # BLD AUTO: 0.68 THOUSAND/ÂΜL (ref 0.17–1.22)
MONOCYTES NFR BLD AUTO: 7 % (ref 4–12)
NEUTROPHILS # BLD AUTO: 6.17 THOUSANDS/ÂΜL (ref 1.85–7.62)
NEUTS SEG NFR BLD AUTO: 66 % (ref 43–75)
NRBC BLD AUTO-RTO: 1 /100 WBCS
PLATELET # BLD AUTO: 268 THOUSANDS/UL (ref 149–390)
PMV BLD AUTO: 9.5 FL (ref 8.9–12.7)
POTASSIUM SERPL-SCNC: 4.4 MMOL/L (ref 3.5–5.3)
PROT SERPL-MCNC: 7.2 G/DL (ref 6.4–8.4)
RBC # BLD AUTO: 6.38 MILLION/UL (ref 3.88–5.62)
SODIUM SERPL-SCNC: 136 MMOL/L (ref 135–147)
WBC # BLD AUTO: 9.34 THOUSAND/UL (ref 4.31–10.16)

## 2024-12-12 PROCEDURE — 93005 ELECTROCARDIOGRAM TRACING: CPT

## 2024-12-12 PROCEDURE — 84484 ASSAY OF TROPONIN QUANT: CPT | Performed by: EMERGENCY MEDICINE

## 2024-12-12 PROCEDURE — 36415 COLL VENOUS BLD VENIPUNCTURE: CPT | Performed by: EMERGENCY MEDICINE

## 2024-12-12 PROCEDURE — 96375 TX/PRO/DX INJ NEW DRUG ADDON: CPT

## 2024-12-12 PROCEDURE — 80053 COMPREHEN METABOLIC PANEL: CPT | Performed by: EMERGENCY MEDICINE

## 2024-12-12 PROCEDURE — 83690 ASSAY OF LIPASE: CPT | Performed by: EMERGENCY MEDICINE

## 2024-12-12 PROCEDURE — 94640 AIRWAY INHALATION TREATMENT: CPT

## 2024-12-12 PROCEDURE — 85025 COMPLETE CBC W/AUTO DIFF WBC: CPT | Performed by: EMERGENCY MEDICINE

## 2024-12-12 PROCEDURE — 96361 HYDRATE IV INFUSION ADD-ON: CPT

## 2024-12-12 PROCEDURE — 99285 EMERGENCY DEPT VISIT HI MDM: CPT | Performed by: EMERGENCY MEDICINE

## 2024-12-12 PROCEDURE — 71045 X-RAY EXAM CHEST 1 VIEW: CPT

## 2024-12-12 PROCEDURE — 99285 EMERGENCY DEPT VISIT HI MDM: CPT

## 2024-12-12 PROCEDURE — 96374 THER/PROPH/DIAG INJ IV PUSH: CPT

## 2024-12-12 RX ORDER — ALBUTEROL SULFATE 0.83 MG/ML
5 SOLUTION RESPIRATORY (INHALATION) ONCE
Status: COMPLETED | OUTPATIENT
Start: 2024-12-12 | End: 2024-12-12

## 2024-12-12 RX ORDER — KETOROLAC TROMETHAMINE 30 MG/ML
15 INJECTION, SOLUTION INTRAMUSCULAR; INTRAVENOUS ONCE
Status: COMPLETED | OUTPATIENT
Start: 2024-12-12 | End: 2024-12-12

## 2024-12-12 RX ORDER — METHYLPREDNISOLONE SODIUM SUCCINATE 125 MG/2ML
62.5 INJECTION, POWDER, LYOPHILIZED, FOR SOLUTION INTRAMUSCULAR; INTRAVENOUS ONCE
Status: COMPLETED | OUTPATIENT
Start: 2024-12-12 | End: 2024-12-12

## 2024-12-12 RX ADMIN — SODIUM CHLORIDE 1000 ML: 0.9 INJECTION, SOLUTION INTRAVENOUS at 14:08

## 2024-12-12 RX ADMIN — KETOROLAC TROMETHAMINE 15 MG: 30 INJECTION, SOLUTION INTRAMUSCULAR at 14:11

## 2024-12-12 RX ADMIN — IPRATROPIUM BROMIDE 0.5 MG: 0.5 SOLUTION RESPIRATORY (INHALATION) at 14:11

## 2024-12-12 RX ADMIN — ALBUTEROL SULFATE 5 MG: 2.5 SOLUTION RESPIRATORY (INHALATION) at 14:11

## 2024-12-12 RX ADMIN — METHYLPREDNISOLONE SODIUM SUCCINATE 62.5 MG: 125 INJECTION, POWDER, FOR SOLUTION INTRAMUSCULAR; INTRAVENOUS at 15:48

## 2024-12-12 RX ADMIN — IPRATROPIUM BROMIDE 0.5 MG: 0.5 SOLUTION RESPIRATORY (INHALATION) at 15:48

## 2024-12-12 RX ADMIN — ALBUTEROL SULFATE 5 MG: 2.5 SOLUTION RESPIRATORY (INHALATION) at 15:48

## 2024-12-12 NOTE — ED PROVIDER NOTES
Time reflects when diagnosis was documented in both MDM as applicable and the Disposition within this note       Time User Action Codes Description Comment    12/12/2024  1:45 PM Obi Riddle Add [R07.9] Chest pain     12/12/2024  1:45 PM RajindergisellObi Add [R06.02] SOB (shortness of breath)           ED Disposition       ED Disposition   Discharge    Condition   Stable    Date/Time   u Dec 12, 2024  2:38 PM    Comment   Juan Andrea discharge to home/self care.                   Assessment & Plan       Medical Decision Making  64-year-old male history of hypertension, COPD, CAD presenting with chest pain.  Atypical chest pain.  Plan for cardiac evaluation including EKG troponin plus chest x-ray.  Basic labs.  Symptom management with IV pain medication and will trial breathing treatment.  Reassess.    EKG interpreted by me with normal sinus rhythm and nonspecific ST abnormality.  Labs interpreted by me without significant acute process.  Chest x-ray interpreted by me without significant acute cardiopulmonary process.  Symptoms improving with medications.  Chest pain precautions. Discussed results and recommendations. Advised follow up PCP and cardiology. Medication recommendations. Given instructions and return precautions. Patient/family at bedside acknowledged understanding of all written and verbal instructions and return precautions. Discharged.     Amount and/or Complexity of Data Reviewed  Labs: ordered.  Radiology: ordered.    Risk  Prescription drug management.             Medications   sodium chloride 0.9 % bolus 1,000 mL (0 mL Intravenous Stopped 12/12/24 1511)   ketorolac (TORADOL) injection 15 mg (15 mg Intravenous Given 12/12/24 1411)   albuterol inhalation solution 5 mg (5 mg Nebulization Given 12/12/24 1411)   ipratropium (ATROVENT) 0.02 % inhalation solution 0.5 mg (0.5 mg Nebulization Given 12/12/24 1411)   albuterol inhalation solution 5 mg (5 mg Nebulization Given 12/12/24 1548)   ipratropium  (ATROVENT) 0.02 % inhalation solution 0.5 mg (0.5 mg Nebulization Given 12/12/24 1548)   methylPREDNISolone sodium succinate (Solu-MEDROL) injection 62.5 mg (62.5 mg Intravenous Given 12/12/24 1548)       ED Risk Strat Scores   HEART Risk Score      Flowsheet Row Most Recent Value   Heart Score Risk Calculator    History 0 Filed at: 12/12/2024 1605   ECG 1 Filed at: 12/12/2024 1605   Age 2 Filed at: 12/12/2024 1605   Risk Factors 2 Filed at: 12/12/2024 1605   Troponin 0 Filed at: 12/12/2024 1605   HEART Score 5 Filed at: 12/12/2024 1605          HEART Risk Score      Flowsheet Row Most Recent Value   Heart Score Risk Calculator    History 0 Filed at: 12/12/2024 1605   ECG 1 Filed at: 12/12/2024 1605   Age 2 Filed at: 12/12/2024 1605   Risk Factors 2 Filed at: 12/12/2024 1605   Troponin 0 Filed at: 12/12/2024 1605   HEART Score 5 Filed at: 12/12/2024 1605                                                History of Present Illness       Chief Complaint   Patient presents with    Chest Pain     Pt arrives in custody of corrections. Reports CP for the past 2 days; radiating into arm intermittently.        Past Medical History:   Diagnosis Date    Angina pectoris (HCC)     Asthma     Cancer (HCC)     liver    Colon polyp     Coronary artery disease     Heart attack (HCC)     Hyperlipidemia     Hypertension     Liver disease     Shortness of breath       Past Surgical History:   Procedure Laterality Date    BACK SURGERY      CHOLECYSTECTOMY      COLONOSCOPY      CORONARY ANGIOPLASTY WITH STENT PLACEMENT      HERNIA REPAIR      HERNIA REPAIR Left 5/17/2022    Procedure: laparoscopic attempted converted to open LEFT inguinal hernia repair with mesh;  Surgeon: Brian Chin MD;  Location: [facility];  Service: General    INGUINAL HERNIA REPAIR Left 05/17/2022    laparoscopic attempted converted to open L inguinal hernia repair w/mesh, Dr. Elsy ALBERT    KNEE ARTHROSCOPY      TONSILLECTOMY        History reviewed. No  pertinent family history.   Social History     Tobacco Use    Smoking status: Never    Smokeless tobacco: Never   Vaping Use    Vaping status: Never Used   Substance Use Topics    Alcohol use: Never    Drug use: Never      E-Cigarette/Vaping    E-Cigarette Use Never User       E-Cigarette/Vaping Substances    Nicotine No     THC No     CBD No     Flavoring No     Other No     Unknown No       I have reviewed and agree with the history as documented.     64-year-old male history of hypertension, COPD, CAD presenting with chest pain.  Patient reports intermittent chest pain over the last few days.  Denies any associated exertion, diaphoresis, nausea/vomiting, lightheadedness/syncope, radiation.  Describes substernal chest discomfort that waxes and wanes.  Reports associated mild shortness of breath which patient reports is near baseline for his COPD.  States that he is supposed be on oxygen but does not wear it chronically.  Denies abdominal pain.  Denies any other complaints.  Chart reviewed.    Past Medical History:  No date: Angina pectoris (HCC)  No date: Asthma  No date: Cancer (HCC)      Comment:  liver  No date: Colon polyp  No date: Coronary artery disease  No date: Heart attack (HCC)  No date: Hyperlipidemia  No date: Hypertension  No date: Liver disease  No date: Shortness of breath  Family History: non-contributory  Social History          Review of Systems   Constitutional:  Negative for appetite change, chills, diaphoresis, fever and unexpected weight change.   HENT:  Negative for congestion and rhinorrhea.    Eyes:  Negative for photophobia and visual disturbance.   Respiratory:  Positive for shortness of breath. Negative for cough and chest tightness.    Cardiovascular:  Positive for chest pain. Negative for palpitations and leg swelling.   Gastrointestinal:  Negative for abdominal distention, abdominal pain, blood in stool, constipation, diarrhea, nausea and vomiting.   Genitourinary:  Negative for  dysuria and hematuria.   Musculoskeletal:  Negative for back pain, joint swelling, neck pain and neck stiffness.   Skin:  Negative for color change, pallor, rash and wound.   Neurological:  Negative for dizziness, syncope, weakness, light-headedness and headaches.   Psychiatric/Behavioral:  Negative for agitation.    All other systems reviewed and are negative.          Objective       ED Triage Vitals [12/12/24 1339]   Temperature Pulse Blood Pressure Respirations SpO2 Patient Position - Orthostatic VS   98.8 °F (37.1 °C) 83 107/79 19 96 % Sitting      Temp Source Heart Rate Source BP Location FiO2 (%) Pain Score    Oral Monitor Right arm -- 6      Vitals      Date and Time Temp Pulse SpO2 Resp BP Pain Score FACES Pain Rating User   12/12/24 1500 -- 69 92 % 20 -- -- -- TW   12/12/24 1339 98.8 °F (37.1 °C) 83 96 % 19 107/79 6 -- LF            Physical Exam  Vitals and nursing note reviewed.   Constitutional:       General: He is not in acute distress.     Appearance: Normal appearance. He is well-developed. He is not ill-appearing, toxic-appearing or diaphoretic.   HENT:      Head: Normocephalic and atraumatic.      Nose: Nose normal. No congestion or rhinorrhea.      Mouth/Throat:      Mouth: Mucous membranes are moist.      Pharynx: Oropharynx is clear. No oropharyngeal exudate or posterior oropharyngeal erythema.   Eyes:      General: No scleral icterus.        Right eye: No discharge.         Left eye: No discharge.      Extraocular Movements: Extraocular movements intact.      Conjunctiva/sclera: Conjunctivae normal.      Pupils: Pupils are equal, round, and reactive to light.   Neck:      Vascular: No JVD.      Trachea: No tracheal deviation.      Comments: Supple. Normal range of motion.   Cardiovascular:      Rate and Rhythm: Normal rate and regular rhythm.      Heart sounds: Normal heart sounds. No murmur heard.     No friction rub. No gallop.      Comments: Normal rate and regular rhythm  Pulmonary:       Effort: Pulmonary effort is normal. No respiratory distress.      Breath sounds: Normal breath sounds. No stridor. No wheezing or rales.      Comments: Clear to auscultation bilaterally  Chest:      Chest wall: No tenderness.   Abdominal:      General: Bowel sounds are normal. There is no distension.      Palpations: Abdomen is soft.      Tenderness: There is no abdominal tenderness. There is no right CVA tenderness, left CVA tenderness, guarding or rebound.      Comments: Soft, nontender, nondistended.  Normal bowel sounds throughout   Musculoskeletal:         General: No swelling, tenderness, deformity or signs of injury. Normal range of motion.      Cervical back: Normal range of motion and neck supple. No rigidity. No muscular tenderness.      Right lower leg: No edema.      Left lower leg: No edema.   Lymphadenopathy:      Cervical: No cervical adenopathy.   Skin:     General: Skin is warm and dry.      Coloration: Skin is not pale.      Findings: No erythema or rash.   Neurological:      General: No focal deficit present.      Mental Status: He is alert. Mental status is at baseline.      Sensory: No sensory deficit.      Motor: No weakness or abnormal muscle tone.      Coordination: Coordination normal.      Gait: Gait normal.      Comments: Alert.  Strength and sensation grossly intact.  Ambulatory without difficulty at baseline.    Psychiatric:         Behavior: Behavior normal.         Thought Content: Thought content normal.         Results Reviewed       Procedure Component Value Units Date/Time    HS Troponin 0hr (reflex protocol) [[id number]]  (Normal) Collected: 12/12/24 1408    Lab Status: Final result Specimen: Blood from Arm, Right Updated: 12/12/24 1438     hs TnI 0hr 3 ng/L     Comprehensive metabolic panel [[id number]]  (Abnormal) Collected: 12/12/24 1408    Lab Status: Final result Specimen: Blood from Arm, Right Updated: 12/12/24 1428     Sodium 136 mmol/L      Potassium 4.4 mmol/L      Chloride  104 mmol/L      CO2 27 mmol/L      ANION GAP 5 mmol/L      BUN 19 mg/dL      Creatinine 0.93 mg/dL      Glucose 84 mg/dL      Calcium 9.5 mg/dL      AST 18 U/L      ALT 12 U/L      Alkaline Phosphatase 52 U/L      Total Protein 7.2 g/dL      Albumin 4.5 g/dL      Total Bilirubin 1.21 mg/dL      eGFR 87 ml/min/1.73sq m     Narrative:      National Kidney Disease Foundation guidelines for Chronic Kidney Disease (CKD):     Stage 1 with normal or high GFR (GFR > 90 mL/min/1.73 square meters)    Stage 2 Mild CKD (GFR = 60-89 mL/min/1.73 square meters)    Stage 3A Moderate CKD (GFR = 45-59 mL/min/1.73 square meters)    Stage 3B Moderate CKD (GFR = 30-44 mL/min/1.73 square meters)    Stage 4 Severe CKD (GFR = 15-29 mL/min/1.73 square meters)    Stage 5 End Stage CKD (GFR <15 mL/min/1.73 square meters)  Note: GFR calculation is accurate only with a steady state creatinine    Lipase [[id number]]  (Abnormal) Collected: 12/12/24 1408    Lab Status: Final result Specimen: Blood from Arm, Right Updated: 12/12/24 1428     Lipase 9 u/L     CBC and differential [[id number]]  (Abnormal) Collected: 12/12/24 1408    Lab Status: Final result Specimen: Blood from Arm, Right Updated: 12/12/24 1426     WBC 9.34 Thousand/uL      RBC 6.38 Million/uL      Hemoglobin 12.0 g/dL      Hematocrit 39.7 %      MCV 62 fL      MCH 18.8 pg      MCHC 30.2 g/dL      RDW 19.3 %      MPV 9.5 fL      Platelets 268 Thousands/uL      nRBC 1 /100 WBCs      Segmented % 66 %      Immature Grans % 2 %      Lymphocytes % 20 %      Monocytes % 7 %      Eosinophils Relative 4 %      Basophils Relative 1 %      Absolute Neutrophils 6.17 Thousands/µL      Absolute Immature Grans 0.18 Thousand/uL      Absolute Lymphocytes 1.89 Thousands/µL      Absolute Monocytes 0.68 Thousand/µL      Eosinophils Absolute 0.37 Thousand/µL      Basophils Absolute 0.05 Thousands/µL             XR chest 1 view portable   Final Interpretation by Roney Quinn MD (12/12 6807)      No  acute cardiopulmonary disease.            Workstation performed: [id number]             Procedures    ED Medication and Procedure Management   Prior to Admission Medications   Prescriptions Last Dose Informant Patient Reported? Taking?   Fluticasone-Salmeterol (Advair) 250-50 mcg/dose inhaler   Yes No   Sig: INHALE 1 PUFF BY MOUTH IN THE MORNING AND BEFORE BEDTIME   Probiotic Product (Bacid) TABS  Self Yes No   Sig: Take 1 tablet by mouth in the morning and 1 tablet in the evening.   albuterol (2.5 mg/3 mL) 0.083 % nebulizer solution  Self Yes No   Sig: Take 2.5 mg by nebulization every 6 (six) hours as needed for wheezing or shortness of breath   atorvastatin (LIPITOR) 40 mg tablet  Self Yes No   Sig: Take 40 mg by mouth in the morning.   lisinopril (ZESTRIL) 10 mg tablet   Yes No   Sig: Take 10 mg by mouth daily   methocarbamol (Robaxin-750) 750 mg tablet   No No   Sig: Take 1 tablet (750 mg total) by mouth every 8 (eight) hours as needed for muscle spasms   metoprolol tartrate (LOPRESSOR) 25 mg tablet   Yes No   Sig: Take 25 mg by mouth every 12 (twelve) hours   nitroglycerin (NITROSTAT) 0.3 mg SL tablet  Self Yes No   Sig: Place 0.3 mg under the tongue every 5 (five) minutes as needed for chest pain   ondansetron (ZOFRAN-ODT) 4 mg disintegrating tablet   No No   Sig: Take 1 tablet (4 mg total) by mouth every 8 (eight) hours as needed for nausea or vomiting   Patient not taking: Reported on 9/15/2023   oxyCODONE-acetaminophen (PERCOCET) 5-325 mg per tablet   No No   Sig: Take 1 tablet by mouth 2 (two) times a day as needed for moderate pain or severe pain Max Daily Amount: 2 tablets   pantoprazole (PROTONIX) 40 mg tablet   No No   Sig: Take 1 tablet (40 mg total) by mouth 2 (two) times a day   sucralfate (CARAFATE) 1 g tablet  Self Yes No   Sig: Take 1 g by mouth 4 (four) times a day   tamsulosin (FLOMAX) 0.4 mg   Yes No   Sig: TAKE 2 CAPSULES BY MOUTH IN THE MORNING      Facility-Administered Medications: None      Patient's Medications   Discharge Prescriptions    No medications on file     No discharge procedures on file.  ED SEPSIS DOCUMENTATION   Time reflects when diagnosis was documented in both MDM as applicable and the Disposition within this note       Time User Action Codes Description Comment    12/12/2024  1:45 PM Obi Riddle Add [R07.9] Chest pain     12/12/2024  1:45 PM Obi Riddle Add [R06.02] SOB (shortness of breath)                  Obi Riddle MD  12/12/24 0101

## 2024-12-14 LAB
ATRIAL RATE: 72 BPM
QRS AXIS: 49 DEGREES
QRSD INTERVAL: 92 MS
QT INTERVAL: 372 MS
QTC INTERVAL: 409 MS
T WAVE AXIS: 47 DEGREES
VENTRICULAR RATE: 73 BPM

## 2024-12-14 PROCEDURE — 93010 ELECTROCARDIOGRAM REPORT: CPT | Performed by: INTERNAL MEDICINE

## (undated) DEVICE — SUT MONOCRYL 4-0 PS-2 18 IN Y496G

## (undated) DEVICE — TUBING SMOKE EVAC W/FILTRATION DEVICE PLUMEPORT ACTIV

## (undated) DEVICE — 3M™ STERI-STRIP™ REINFORCED ADHESIVE SKIN CLOSURES, R1546, 1/4 IN X 4 IN (6 MM X 100 MM), 10 STRIPS/ENVELOPE: Brand: 3M™ STERI-STRIP™

## (undated) DEVICE — CHLORAPREP HI-LITE 26ML ORANGE

## (undated) DEVICE — DRAPE EQUIPMENT RF WAND

## (undated) DEVICE — ALLENTOWN LAP CHOLE APP PACK: Brand: CARDINAL HEALTH

## (undated) DEVICE — GAUZE SPONGES,8 PLY: Brand: CURITY

## (undated) DEVICE — TROCAR HERNIA OVAL PERITONEAL DISTENTION BALLOON

## (undated) DEVICE — INTENDED FOR TISSUE SEPARATION, AND OTHER PROCEDURES THAT REQUIRE A SHARP SURGICAL BLADE TO PUNCTURE OR CUT.: Brand: BARD-PARKER SAFETY BLADES SIZE 11, STERILE

## (undated) DEVICE — GAUZE SPONGES,USP TYPE VII GAUZE, 12 PLY: Brand: CURITY

## (undated) DEVICE — ELECTRODE LAP L WIRE E-Z CLEAN 33CM -0100

## (undated) DEVICE — SCD SEQUENTIAL COMPRESSION COMFORT SLEEVE MEDIUM KNEE LENGTH: Brand: KENDALL SCD

## (undated) DEVICE — 3M™ TEGADERM™ TRANSPARENT FILM DRESSING FRAME STYLE, 1626W, 4 IN X 4-3/4 IN (10 CM X 12 CM), 50/CT 4CT/CASE: Brand: 3M™ TEGADERM™

## (undated) DEVICE — TROCAR: Brand: KII SLEEVE

## (undated) DEVICE — LIGHT HANDLE COVER SLEEVE DISP BLUE STELLAR

## (undated) DEVICE — 3M™ STERI-STRIP™ REINFORCED ADHESIVE SKIN CLOSURES, R1541, 1/4 IN X 3 IN (6 MM X 75 MM), 3 STRIPS/ENVELOPE: Brand: 3M™ STERI-STRIP™

## (undated) DEVICE — TROCAR HERNIA BALLON STRUCTURED 10 MM

## (undated) DEVICE — TROCAR: Brand: KII FIOS FIRST ENTRY

## (undated) DEVICE — 3M™ TEGADERM™ TRANSPARENT FILM DRESSING FRAME STYLE, 1624W, 2-3/8 IN X 2-3/4 IN (6 CM X 7 CM), 100/CT 4CT/CASE: Brand: 3M™ TEGADERM™

## (undated) DEVICE — GLOVE SRG BIOGEL 7

## (undated) DEVICE — [HIGH FLOW INSUFFLATOR,  DO NOT USE IF PACKAGE IS DAMAGED,  KEEP DRY,  KEEP AWAY FROM SUNLIGHT,  PROTECT FROM HEAT AND RADIOACTIVE SOURCES.]: Brand: PNEUMOSURE

## (undated) DEVICE — SUT VICRYL 0 UR-6 27 IN J603H

## (undated) DEVICE — PAD GROUNDING ADULT